# Patient Record
Sex: MALE | Race: WHITE | NOT HISPANIC OR LATINO | Employment: FULL TIME | URBAN - METROPOLITAN AREA
[De-identification: names, ages, dates, MRNs, and addresses within clinical notes are randomized per-mention and may not be internally consistent; named-entity substitution may affect disease eponyms.]

---

## 2022-01-07 ENCOUNTER — HOSPITAL ENCOUNTER (INPATIENT)
Facility: HOSPITAL | Age: 67
LOS: 1 days | Discharge: HOME | DRG: 177 | End: 2022-01-08
Attending: EMERGENCY MEDICINE | Admitting: STUDENT IN AN ORGANIZED HEALTH CARE EDUCATION/TRAINING PROGRAM
Payer: COMMERCIAL

## 2022-01-07 ENCOUNTER — APPOINTMENT (EMERGENCY)
Dept: RADIOLOGY | Facility: HOSPITAL | Age: 67
DRG: 177 | End: 2022-01-07
Payer: COMMERCIAL

## 2022-01-07 DIAGNOSIS — R06.6 SINGULTUS: ICD-10-CM

## 2022-01-07 DIAGNOSIS — U07.1 PNEUMONIA DUE TO COVID-19 VIRUS: ICD-10-CM

## 2022-01-07 DIAGNOSIS — U07.1 COVID-19: ICD-10-CM

## 2022-01-07 DIAGNOSIS — R09.02 HYPOXIA: ICD-10-CM

## 2022-01-07 DIAGNOSIS — E86.0 DEHYDRATION: ICD-10-CM

## 2022-01-07 DIAGNOSIS — A41.9 SEPSIS (HCC): Primary | ICD-10-CM

## 2022-01-07 DIAGNOSIS — E87.1 HYPONATREMIA: ICD-10-CM

## 2022-01-07 DIAGNOSIS — N17.9 AKI (ACUTE KIDNEY INJURY) (HCC): ICD-10-CM

## 2022-01-07 DIAGNOSIS — J12.82 PNEUMONIA DUE TO COVID-19 VIRUS: ICD-10-CM

## 2022-01-07 PROBLEM — R79.89 ELEVATED SERUM CREATININE: Status: ACTIVE | Noted: 2022-01-07

## 2022-01-07 PROBLEM — I10 HYPERTENSION: Status: ACTIVE | Noted: 2022-01-07

## 2022-01-07 PROBLEM — J96.01 ACUTE RESPIRATORY FAILURE WITH HYPOXIA (HCC): Status: ACTIVE | Noted: 2022-01-07

## 2022-01-07 LAB
2HR DELTA HS TROPONIN: 3 NG/L
4HR DELTA HS TROPONIN: -3 NG/L
ALBUMIN SERPL BCP-MCNC: 3 G/DL (ref 3.5–5)
ALP SERPL-CCNC: 132 U/L (ref 46–116)
ALT SERPL W P-5'-P-CCNC: 85 U/L (ref 12–78)
ANION GAP SERPL CALCULATED.3IONS-SCNC: 11 MMOL/L (ref 4–13)
APTT PPP: 32 SECONDS (ref 23–37)
AST SERPL W P-5'-P-CCNC: 102 U/L (ref 5–45)
BACTERIA UR QL AUTO: ABNORMAL /HPF
BASOPHILS # BLD AUTO: 0.01 THOUSANDS/ΜL (ref 0–0.1)
BASOPHILS NFR BLD AUTO: 0 % (ref 0–1)
BILIRUB SERPL-MCNC: 0.71 MG/DL (ref 0.2–1)
BILIRUB UR QL STRIP: NEGATIVE
BUN SERPL-MCNC: 31 MG/DL (ref 5–25)
CALCIUM ALBUM COR SERPL-MCNC: 9.1 MG/DL (ref 8.3–10.1)
CALCIUM SERPL-MCNC: 8.3 MG/DL (ref 8.3–10.1)
CARDIAC TROPONIN I PNL SERPL HS: 21 NG/L
CARDIAC TROPONIN I PNL SERPL HS: 24 NG/L
CARDIAC TROPONIN I PNL SERPL HS: 27 NG/L
CHLORIDE SERPL-SCNC: 93 MMOL/L (ref 100–108)
CLARITY UR: CLEAR
CO2 SERPL-SCNC: 26 MMOL/L (ref 21–32)
COLOR UR: YELLOW
CREAT SERPL-MCNC: 1.57 MG/DL (ref 0.6–1.3)
D DIMER PPP FEU-MCNC: 3.39 UG/ML FEU
EOSINOPHIL # BLD AUTO: 0 THOUSAND/ΜL (ref 0–0.61)
EOSINOPHIL NFR BLD AUTO: 0 % (ref 0–6)
ERYTHROCYTE [DISTWIDTH] IN BLOOD BY AUTOMATED COUNT: 14.3 % (ref 11.6–15.1)
FLUAV RNA RESP QL NAA+PROBE: NEGATIVE
FLUBV RNA RESP QL NAA+PROBE: NEGATIVE
GFR SERPL CREATININE-BSD FRML MDRD: 44 ML/MIN/1.73SQ M
GLUCOSE SERPL-MCNC: 108 MG/DL (ref 65–140)
GLUCOSE UR STRIP-MCNC: NEGATIVE MG/DL
HCT VFR BLD AUTO: 37.2 % (ref 36.5–49.3)
HGB BLD-MCNC: 12.3 G/DL (ref 12–17)
HGB UR QL STRIP.AUTO: ABNORMAL
HYALINE CASTS #/AREA URNS LPF: ABNORMAL /LPF
IMM GRANULOCYTES # BLD AUTO: 0.08 THOUSAND/UL (ref 0–0.2)
IMM GRANULOCYTES NFR BLD AUTO: 1 % (ref 0–2)
INR PPP: 0.93 (ref 0.84–1.19)
KETONES UR STRIP-MCNC: ABNORMAL MG/DL
LACTATE SERPL-SCNC: 1 MMOL/L (ref 0.5–2)
LEUKOCYTE ESTERASE UR QL STRIP: NEGATIVE
LYMPHOCYTES # BLD AUTO: 0.59 THOUSANDS/ΜL (ref 0.6–4.47)
LYMPHOCYTES NFR BLD AUTO: 8 % (ref 14–44)
MAGNESIUM SERPL-MCNC: 2.4 MG/DL (ref 1.6–2.6)
MCH RBC QN AUTO: 27.8 PG (ref 26.8–34.3)
MCHC RBC AUTO-ENTMCNC: 33.1 G/DL (ref 31.4–37.4)
MCV RBC AUTO: 84 FL (ref 82–98)
MONOCYTES # BLD AUTO: 0.79 THOUSAND/ΜL (ref 0.17–1.22)
MONOCYTES NFR BLD AUTO: 10 % (ref 4–12)
NEUTROPHILS # BLD AUTO: 6.09 THOUSANDS/ΜL (ref 1.85–7.62)
NEUTS SEG NFR BLD AUTO: 81 % (ref 43–75)
NITRITE UR QL STRIP: NEGATIVE
NON-SQ EPI CELLS URNS QL MICRO: ABNORMAL /HPF
NRBC BLD AUTO-RTO: 0 /100 WBCS
NT-PROBNP SERPL-MCNC: 115 PG/ML
PH UR STRIP.AUTO: 5.5 [PH]
PLATELET # BLD AUTO: 359 THOUSANDS/UL (ref 149–390)
PMV BLD AUTO: 9.4 FL (ref 8.9–12.7)
POTASSIUM SERPL-SCNC: 3.7 MMOL/L (ref 3.5–5.3)
PROCALCITONIN SERPL-MCNC: 0.39 NG/ML
PROT SERPL-MCNC: 7.2 G/DL (ref 6.4–8.2)
PROT UR STRIP-MCNC: ABNORMAL MG/DL
PROTHROMBIN TIME: 12.3 SECONDS (ref 11.6–14.5)
RBC # BLD AUTO: 4.43 MILLION/UL (ref 3.88–5.62)
RBC #/AREA URNS AUTO: ABNORMAL /HPF
RSV RNA RESP QL NAA+PROBE: NEGATIVE
SARS-COV-2 RNA RESP QL NAA+PROBE: POSITIVE
SODIUM SERPL-SCNC: 130 MMOL/L (ref 136–145)
SP GR UR STRIP.AUTO: 1.02 (ref 1–1.03)
UROBILINOGEN UR QL STRIP.AUTO: 0.2 E.U./DL
WBC # BLD AUTO: 7.56 THOUSAND/UL (ref 4.31–10.16)
WBC #/AREA URNS AUTO: ABNORMAL /HPF
WBC CASTS URNS QL MICRO: ABNORMAL /LPF

## 2022-01-07 PROCEDURE — 36415 COLL VENOUS BLD VENIPUNCTURE: CPT | Performed by: EMERGENCY MEDICINE

## 2022-01-07 PROCEDURE — 83880 ASSAY OF NATRIURETIC PEPTIDE: CPT | Performed by: EMERGENCY MEDICINE

## 2022-01-07 PROCEDURE — 85379 FIBRIN DEGRADATION QUANT: CPT | Performed by: STUDENT IN AN ORGANIZED HEALTH CARE EDUCATION/TRAINING PROGRAM

## 2022-01-07 PROCEDURE — 93005 ELECTROCARDIOGRAM TRACING: CPT

## 2022-01-07 PROCEDURE — 96374 THER/PROPH/DIAG INJ IV PUSH: CPT

## 2022-01-07 PROCEDURE — 0241U HB NFCT DS VIR RESP RNA 4 TRGT: CPT | Performed by: EMERGENCY MEDICINE

## 2022-01-07 PROCEDURE — 85610 PROTHROMBIN TIME: CPT | Performed by: EMERGENCY MEDICINE

## 2022-01-07 PROCEDURE — 83735 ASSAY OF MAGNESIUM: CPT | Performed by: EMERGENCY MEDICINE

## 2022-01-07 PROCEDURE — 85730 THROMBOPLASTIN TIME PARTIAL: CPT | Performed by: EMERGENCY MEDICINE

## 2022-01-07 PROCEDURE — XW033E5 INTRODUCTION OF REMDESIVIR ANTI-INFECTIVE INTO PERIPHERAL VEIN, PERCUTANEOUS APPROACH, NEW TECHNOLOGY GROUP 5: ICD-10-PCS | Performed by: STUDENT IN AN ORGANIZED HEALTH CARE EDUCATION/TRAINING PROGRAM

## 2022-01-07 PROCEDURE — 71045 X-RAY EXAM CHEST 1 VIEW: CPT

## 2022-01-07 PROCEDURE — 85025 COMPLETE CBC W/AUTO DIFF WBC: CPT | Performed by: EMERGENCY MEDICINE

## 2022-01-07 PROCEDURE — 96361 HYDRATE IV INFUSION ADD-ON: CPT

## 2022-01-07 PROCEDURE — 81001 URINALYSIS AUTO W/SCOPE: CPT | Performed by: EMERGENCY MEDICINE

## 2022-01-07 PROCEDURE — 83605 ASSAY OF LACTIC ACID: CPT | Performed by: EMERGENCY MEDICINE

## 2022-01-07 PROCEDURE — 99284 EMERGENCY DEPT VISIT MOD MDM: CPT

## 2022-01-07 PROCEDURE — 87040 BLOOD CULTURE FOR BACTERIA: CPT | Performed by: EMERGENCY MEDICINE

## 2022-01-07 PROCEDURE — 84484 ASSAY OF TROPONIN QUANT: CPT | Performed by: EMERGENCY MEDICINE

## 2022-01-07 PROCEDURE — 99291 CRITICAL CARE FIRST HOUR: CPT | Performed by: EMERGENCY MEDICINE

## 2022-01-07 PROCEDURE — 84145 PROCALCITONIN (PCT): CPT | Performed by: EMERGENCY MEDICINE

## 2022-01-07 PROCEDURE — 80053 COMPREHEN METABOLIC PANEL: CPT | Performed by: EMERGENCY MEDICINE

## 2022-01-07 RX ORDER — ALBUTEROL SULFATE 90 UG/1
2 AEROSOL, METERED RESPIRATORY (INHALATION) EVERY 6 HOURS PRN
COMMUNITY
End: 2022-02-14

## 2022-01-07 RX ORDER — ALBUTEROL SULFATE 90 UG/1
2 AEROSOL, METERED RESPIRATORY (INHALATION) EVERY 4 HOURS PRN
Status: DISCONTINUED | OUTPATIENT
Start: 2022-01-07 | End: 2022-01-08 | Stop reason: HOSPADM

## 2022-01-07 RX ORDER — DEXAMETHASONE SODIUM PHOSPHATE 4 MG/ML
6 INJECTION, SOLUTION INTRA-ARTICULAR; INTRALESIONAL; INTRAMUSCULAR; INTRAVENOUS; SOFT TISSUE EVERY 24 HOURS
Status: DISCONTINUED | OUTPATIENT
Start: 2022-01-08 | End: 2022-01-08 | Stop reason: HOSPADM

## 2022-01-07 RX ORDER — SODIUM CHLORIDE 9 MG/ML
75 INJECTION, SOLUTION INTRAVENOUS CONTINUOUS
Status: DISCONTINUED | OUTPATIENT
Start: 2022-01-07 | End: 2022-01-08 | Stop reason: HOSPADM

## 2022-01-07 RX ORDER — ACETAMINOPHEN 325 MG/1
650 TABLET ORAL ONCE
Status: COMPLETED | OUTPATIENT
Start: 2022-01-07 | End: 2022-01-07

## 2022-01-07 RX ORDER — ACETAMINOPHEN 325 MG/1
650 TABLET ORAL EVERY 6 HOURS PRN
Status: DISCONTINUED | OUTPATIENT
Start: 2022-01-07 | End: 2022-01-08 | Stop reason: HOSPADM

## 2022-01-07 RX ORDER — AZITHROMYCIN 250 MG/1
500 TABLET, FILM COATED ORAL EVERY 24 HOURS
COMMUNITY
End: 2022-01-08 | Stop reason: HOSPADM

## 2022-01-07 RX ORDER — LISINOPRIL AND HYDROCHLOROTHIAZIDE 12.5; 1 MG/1; MG/1
1 TABLET ORAL DAILY
COMMUNITY
End: 2022-01-18 | Stop reason: HOSPADM

## 2022-01-07 RX ORDER — DEXAMETHASONE SODIUM PHOSPHATE 4 MG/ML
10 INJECTION, SOLUTION INTRA-ARTICULAR; INTRALESIONAL; INTRAMUSCULAR; INTRAVENOUS; SOFT TISSUE ONCE
Status: COMPLETED | OUTPATIENT
Start: 2022-01-07 | End: 2022-01-07

## 2022-01-07 RX ORDER — GUAIFENESIN AND CODEINE PHOSPHATE 100; 10 MG/5ML; MG/5ML
5 SOLUTION ORAL 3 TIMES DAILY PRN
COMMUNITY
End: 2022-02-14

## 2022-01-07 RX ORDER — ONDANSETRON 2 MG/ML
4 INJECTION INTRAMUSCULAR; INTRAVENOUS ONCE
Status: DISCONTINUED | OUTPATIENT
Start: 2022-01-07 | End: 2022-01-07

## 2022-01-07 RX ORDER — METOCLOPRAMIDE HYDROCHLORIDE 5 MG/ML
10 INJECTION INTRAMUSCULAR; INTRAVENOUS ONCE
Status: COMPLETED | OUTPATIENT
Start: 2022-01-07 | End: 2022-01-07

## 2022-01-07 RX ADMIN — IPRATROPIUM BROMIDE 0.5 MG: 0.5 SOLUTION RESPIRATORY (INHALATION) at 15:42

## 2022-01-07 RX ADMIN — METOCLOPRAMIDE 10 MG: 5 INJECTION, SOLUTION INTRAMUSCULAR; INTRAVENOUS at 13:35

## 2022-01-07 RX ADMIN — REMDESIVIR 200 MG: 100 INJECTION, POWDER, LYOPHILIZED, FOR SOLUTION INTRAVENOUS at 17:14

## 2022-01-07 RX ADMIN — SODIUM CHLORIDE 75 ML/HR: 0.9 INJECTION, SOLUTION INTRAVENOUS at 16:14

## 2022-01-07 RX ADMIN — SODIUM CHLORIDE 500 ML: 0.9 INJECTION, SOLUTION INTRAVENOUS at 13:33

## 2022-01-07 RX ADMIN — ALBUTEROL SULFATE 5 MG: 2.5 SOLUTION RESPIRATORY (INHALATION) at 15:42

## 2022-01-07 RX ADMIN — ENOXAPARIN SODIUM 90 MG: 100 INJECTION SUBCUTANEOUS at 17:14

## 2022-01-07 RX ADMIN — SODIUM CHLORIDE 75 ML/HR: 0.9 INJECTION, SOLUTION INTRAVENOUS at 21:06

## 2022-01-07 RX ADMIN — ACETAMINOPHEN 650 MG: 325 TABLET, FILM COATED ORAL at 13:42

## 2022-01-07 RX ADMIN — DEXAMETHASONE SODIUM PHOSPHATE 10 MG: 4 INJECTION INTRA-ARTICULAR; INTRALESIONAL; INTRAMUSCULAR; INTRAVENOUS; SOFT TISSUE at 13:42

## 2022-01-07 NOTE — ASSESSMENT & PLAN NOTE
Chest x-ray with bibasilar opacities  Initiate COVID protocol with IV steroids and remdesivir  Anticoagulation  Supplemental oxygen  Incentive spirometer and encourage proning  Trend inflammatory markers

## 2022-01-07 NOTE — H&P
Yomaira U  64   H&P- Charisma Landrum 1955, 74 y o  male MRN: [id number]  Unit/Bed#: ED 05 Encounter: [id number]  Primary Care Provider: No primary care provider on file  Date and time admitted to hospital: 1/7/2022 12:03 PM    * Acute respiratory failure with hypoxia (HCC)  Assessment & Plan  Likely due to COVID-19  Continue supplemental oxygen    COVID-19  Assessment & Plan  Chest x-ray with bibasilar opacities  Initiate COVID protocol with IV steroids and remdesivir  Supplemental oxygen  Incentive spirometer and encourage proning  Trend inflammatory markers    Elevated serum creatinine  Assessment & Plan  Unknown baseline  Likely due to dehydration  Continue IV fluids    Hyponatremia  Assessment & Plan  Likely due to volume depletion  Continue IV fluids      Hypertension  Assessment & Plan  Hold home diuretics/ARB in setting of elevated creatinine      VTE Pharmacologic Prophylaxis:   Moderate Risk (Score 3-4) - Pharmacological DVT Prophylaxis Ordered: enoxaparin (Lovenox)  Code Status: No Order     Anticipated Length of Stay: Patient will be admitted on an inpatient basis with an anticipated length of stay of greater than 2 midnights secondary to Respiratory failure  Total Time for Visit, including Counseling / Coordination of Care: 45 minutes Greater than 50% of this total time spent on direct patient counseling and coordination of care  Chief Complaint: Shortness of breath     History of Present Illness:  Charisma Landrum is a 74 y o  male with a PMH of hypertension presenting with shortness of breath associated with cough and generalized fatigue which started about 2 weeks prior  Patient reports his daughter as a sick contact who tested positive for COVID  Patient is not vaccinated  Denies any recent travel  Patient was taking azithromycin and albuterol inhaler prescribed outpatient however his symptoms did not improve which prompted his arrival in the ED today  Review of Systems:  Review of Systems   Constitutional: Positive for fatigue  Negative for chills and fever  HENT: Negative for rhinorrhea and sore throat  Respiratory: Positive for cough and shortness of breath  Cardiovascular: Negative for chest pain and leg swelling  Gastrointestinal: Negative for abdominal pain, constipation, diarrhea, nausea and vomiting  Genitourinary: Negative for dysuria and hematuria  Neurological: Negative for dizziness, syncope and headaches  Psychiatric/Behavioral: Negative for agitation  The patient is not nervous/anxious  Past Medical and Surgical History:   Past Medical History:   Diagnosis Date    Hypertension        History reviewed  No pertinent surgical history  Meds/Allergies:  Prior to Admission medications    Medication Sig Start Date End Date Taking? Authorizing Provider   albuterol (PROVENTIL HFA,VENTOLIN HFA) 90 mcg/act inhaler Inhale 2 puffs every 6 (six) hours as needed for wheezing   Yes Historical Provider, MD   azithromycin (ZITHROMAX) 250 mg tablet Take 500 mg by mouth every 24 hours   Yes Historical Provider, MD   guaifenesin-codeine (GUAIFENESIN AC) 100-10 MG/5ML liquid Take 5 mL by mouth 3 (three) times a day as needed for cough   Yes Historical Provider, MD   lisinopril-hydrochlorothiazide (PRINZIDE,ZESTORETIC) 10-12 5 MG per tablet Take 1 tablet by mouth daily   Yes Historical Provider, MD     I have reviewed home medications with patient personally  Allergies:    Allergies   Allergen Reactions    Sulfa Antibiotics Other (See Comments)     unknown       Social History:  Marital Status: /Civil Union   Occupation:  [employer]  Patient Pre-hospital Living Situation: Home  Patient Pre-hospital Level of Mobility: walks  Patient Pre-hospital Diet Restrictions: None  Substance Use History:   Social History     Substance and Sexual Activity   Alcohol Use None     Social History     Tobacco Use   Smoking Status Never Smoker Smokeless Tobacco Never Used     Social History     Substance and Sexual Activity   Drug Use Not on file       Family History:  History reviewed  No pertinent family history  Physical Exam:     Vitals:   Blood Pressure: 98/57 (01/07/22 1533)  Pulse: 90 (01/07/22 1533)  Temperature: 98 9 °F (37 2 °C) (01/07/22 1533)  Temp Source: Oral (01/07/22 1533)  Respirations: (!) 26 (01/07/22 1533)  Height: 6' 3" (190 5 cm) (01/07/22 1136)  Weight - Scale: 90 7 kg (200 lb) (01/07/22 1136)  SpO2: 93 % (01/07/22 1533)    Physical Exam  HENT:      Head: Normocephalic and atraumatic  Mouth/Throat:      Mouth: Mucous membranes are moist    Eyes:      Extraocular Movements: Extraocular movements intact  Cardiovascular:      Rate and Rhythm: Normal rate and regular rhythm  Pulmonary:      Effort: Pulmonary effort is normal       Comments: Decreased breath sounds  Abdominal:      General: Abdomen is flat  Bowel sounds are normal       Palpations: Abdomen is soft  Tenderness: There is no abdominal tenderness  Musculoskeletal:      Right lower leg: No edema  Left lower leg: No edema  Skin:     General: Skin is warm and dry  Neurological:      Mental Status: He is alert and oriented to person, place, and time            Additional Data:     Lab Results:  Results from last 7 days   Lab Units 01/07/22  1309   WBC Thousand/uL 7 56   HEMOGLOBIN g/dL 12 3   HEMATOCRIT % 37 2   PLATELETS Thousands/uL 359   NEUTROS PCT % 81*   LYMPHS PCT % 8*   MONOS PCT % 10   EOS PCT % 0     Results from last 7 days   Lab Units 01/07/22  1309   SODIUM mmol/L 130*   POTASSIUM mmol/L 3 7   CHLORIDE mmol/L 93*   CO2 mmol/L 26   BUN mg/dL 31*   CREATININE mg/dL 1 57*   ANION GAP mmol/L 11   CALCIUM mg/dL 8 3   ALBUMIN g/dL 3 0*   TOTAL BILIRUBIN mg/dL 0 71   ALK PHOS U/L 132*   ALT U/L 85*   AST U/L 102*   GLUCOSE RANDOM mg/dL 108     Results from last 7 days   Lab Units 01/07/22  1309   INR  0 93             Results from last 7 days Lab Units 01/07/22  1309   LACTIC ACID mmol/L 1 0       Imaging: Reviewed radiology reports from this admission including: chest xray  XR chest 1 view portable   Final Result by Víctor Singh MD (01/07 1305)      Bibasilar opacities which can represent pneumonia, including COVID 19, in the appropriate clinical setting  Workstation performed: [id number]                 ** Please Note: This note has been constructed using a voice recognition system   **

## 2022-01-07 NOTE — ED PROVIDER NOTES
History  Chief Complaint   Patient presents with    Flu Symptoms     started the 30th with symptoms and now having muscle spasms and hiccups with sob     49-year-old male with past history of hypertension, presents to the ED for evaluation generalized body aches, fevers, chills, shortness of breath, nausea over the past week  Symptoms started after daughter was sick and diagnosed with COVID-19 infection  Patient is not vaccinated against COVID-19 infection  Patient was taking ivermectin by PCP for COVID-19 prevention  Patient was given azithromycin and cough medicine by PCP for his symptoms which is not helping  Patient continues to have high fever  Patient is febrile and tachycardic in the emergency department regarding source  Patient also knows that he is having uncontrollable hiccups over the past 4 days  History provided by:  Patient  Flu Symptoms  Presenting symptoms: fatigue, fever, nausea and shortness of breath    Presenting symptoms: no cough, no diarrhea, no headaches, no sore throat and no vomiting    Associated symptoms: chills    Associated symptoms: no congestion        None       Past Medical History:   Diagnosis Date    Hypertension        History reviewed  No pertinent surgical history  History reviewed  No pertinent family history  I have reviewed and agree with the history as documented  E-Cigarette/Vaping     E-Cigarette/Vaping Substances     Social History     Tobacco Use    Smoking status: Never Smoker    Smokeless tobacco: Never Used   Substance Use Topics    Alcohol use: Not on file    Drug use: Not on file       Review of Systems   Constitutional: Positive for chills, fatigue and fever  Negative for activity change  HENT: Negative for congestion, ear discharge and sore throat  Eyes: Negative for pain and redness  Respiratory: Positive for shortness of breath  Negative for cough, chest tightness and wheezing  Cardiovascular: Negative for chest pain  Gastrointestinal: Positive for nausea  Negative for abdominal pain, diarrhea and vomiting  Endocrine: Negative for cold intolerance  Genitourinary: Negative for dysuria and urgency  Musculoskeletal: Negative for arthralgias and back pain  Neurological: Negative for dizziness, weakness and headaches  Psychiatric/Behavioral: Negative for agitation and behavioral problems  Physical Exam  Physical Exam  Vitals and nursing note reviewed  Constitutional:       Appearance: He is well-developed  HENT:      Head: Normocephalic and atraumatic  Nose: Nose normal    Eyes:      Conjunctiva/sclera: Conjunctivae normal    Cardiovascular:      Rate and Rhythm: Normal rate and regular rhythm  Heart sounds: Normal heart sounds  Pulmonary:      Effort: Pulmonary effort is normal       Breath sounds: Normal breath sounds  Comments: Crackles noted to bibasilar lungs  Patient is satting 91% on room air  Abdominal:      General: Bowel sounds are normal  There is no distension  Palpations: Abdomen is soft  Tenderness: There is no abdominal tenderness  Musculoskeletal:         General: Normal range of motion  Cervical back: Normal range of motion and neck supple  Skin:     General: Skin is warm  Neurological:      General: No focal deficit present  Mental Status: He is alert and oriented to person, place, and time  Psychiatric:         Mood and Affect: Mood normal          Behavior: Behavior normal          Thought Content:  Thought content normal          Judgment: Judgment normal          Vital Signs  ED Triage Vitals   Temperature Pulse Respirations Blood Pressure SpO2   01/07/22 1136 01/07/22 1136 01/07/22 1136 01/07/22 1136 01/07/22 1136   (!) 102 2 °F (39 °C) 105 20 148/79 91 %      Temp Source Heart Rate Source Patient Position - Orthostatic VS BP Location FiO2 (%)   01/07/22 1533 01/07/22 1316 01/07/22 1316 01/07/22 1316 --   Oral Monitor Lying Right arm       Pain Score       01/07/22 1316       No Pain           Vitals:    01/07/22 1136 01/07/22 1316 01/07/22 1515 01/07/22 1533   BP: 148/79 123/57  98/57   Pulse: 105 102 87 90   Patient Position - Orthostatic VS:  Lying Lying Sitting         Visual Acuity      ED Medications  Medications   dexamethasone (DECADRON) injection 6 mg (has no administration in time range)   remdesivir (Veklury) 200 mg in sodium chloride 0 9 % 290 mL IVPB (has no administration in time range)     Followed by   remdesivir Rubio Smoker) 100 mg in sodium chloride 0 9 % 270 mL IVPB (has no administration in time range)   albuterol (PROVENTIL HFA,VENTOLIN HFA) inhaler 2 puff (has no administration in time range)   sodium chloride 0 9 % bolus 500 mL (0 mL Intravenous Stopped 1/7/22 1511)   metoclopramide (REGLAN) injection 10 mg (10 mg Intravenous Given 1/7/22 1335)   dexamethasone (DECADRON) injection 10 mg (10 mg Intravenous Given 1/7/22 1342)   ipratropium (ATROVENT) 0 02 % inhalation solution 0 5 mg (0 5 mg Nebulization Given 1/7/22 1542)   albuterol inhalation solution 5 mg (5 mg Nebulization Given 1/7/22 1542)   acetaminophen (TYLENOL) tablet 650 mg (650 mg Oral Given 1/7/22 1342)       Diagnostic Studies  Results Reviewed     Procedure Component Value Units Date/Time    HS Troponin I 2hr [[id number]] Collected: 01/07/22 1541    Lab Status: No result Specimen: Blood from Arm, Right     D-dimer, quantitative [[id number]]     Lab Status: No result Specimen: Blood     HS Troponin I 4hr [[id number]]     Lab Status: No result Specimen: Blood     COVID/FLU/RSV - 2 hour TAT [[id number]]  (Abnormal) Collected: 01/07/22 1309    Lab Status: Final result Specimen: Nares from Nose Updated: 01/07/22 1409     SARS-CoV-2 Positive     INFLUENZA A PCR Negative     INFLUENZA B PCR Negative     RSV PCR Negative    Narrative:      FOR PEDIATRIC PATIENTS - copy/paste COVID Guidelines URL to browser: [email or web address]    SARS-CoV-2 assay is a Nucleic Acid Amplification assay intended for the  qualitative detection of nucleic acid from SARS-CoV-2 in nasopharyngeal  swabs  Results are for the presumptive identification of SARS-CoV-2 RNA  Positive results are indicative of infection with SARS-CoV-2, the virus  causing COVID-19, but do not rule out bacterial infection or co-infection  with other viruses  Laboratories within the [place] and its  territories are required to report all positive results to the appropriate  public health authorities  Negative results do not preclude SARS-CoV-2  infection and should not be used as the sole basis for treatment or other  patient management decisions  Negative results must be combined with  clinical observations, patient history, and epidemiological information  This test has not been FDA cleared or approved  This test has been authorized by FDA under an Emergency Use Authorization  (EUA)  This test is only authorized for the duration of time the  declaration that circumstances exist justifying the authorization of the  emergency use of an in vitro diagnostic tests for detection of SARS-CoV-2  virus and/or diagnosis of COVID-19 infection under section 564(b)(1) of  the Act, 21 U  S C  176TGL-4(C)(6), unless the authorization is terminated  or revoked sooner  The test has been validated but independent review by FDA  and CLIA is pending  Test performed using [employer] GeneXpert: This RT-PCR assay targets N2,  a region unique to SARS-CoV-2  A conserved region in the E-gene was chosen  for pan-Sarbecovirus detection which includes SARS-CoV-2      Comprehensive metabolic panel [[id number]]  (Abnormal) Collected: 01/07/22 1309    Lab Status: Final result Specimen: Blood from Arm, Left Updated: 01/07/22 1359     Sodium 130 mmol/L      Potassium 3 7 mmol/L      Chloride 93 mmol/L      CO2 26 mmol/L      ANION GAP 11 mmol/L      BUN 31 mg/dL      Creatinine 1 57 mg/dL      Glucose 108 mg/dL      Calcium 8 3 mg/dL      Corrected Calcium 9 1 mg/dL       U/L      ALT 85 U/L      Alkaline Phosphatase 132 U/L      Total Protein 7 2 g/dL      Albumin 3 0 g/dL      Total Bilirubin 0 71 mg/dL      eGFR 44 ml/min/1 73sq m     Narrative:      [place] guidelines for Chronic Kidney Disease (CKD):     Stage 1 with normal or high GFR (GFR > 90 mL/min/1 73 square meters)    Stage 2 Mild CKD (GFR = 60-89 mL/min/1 73 square meters)    Stage 3A Moderate CKD (GFR = 45-59 mL/min/1 73 square meters)    Stage 3B Moderate CKD (GFR = 30-44 mL/min/1 73 square meters)    Stage 4 Severe CKD (GFR = 15-29 mL/min/1 73 square meters)    Stage 5 End Stage CKD (GFR <15 mL/min/1 73 square meters)  Note: GFR calculation is accurate only with a steady state creatinine    Magnesium [[id number]]  (Normal) Collected: 01/07/22 1309    Lab Status: Final result Specimen: Blood from Arm, Left Updated: 01/07/22 1359     Magnesium 2 4 mg/dL     NT-BNP PRO [[id number]]  (Normal) Collected: 01/07/22 1309    Lab Status: Final result Specimen: Blood from Arm, Left Updated: 01/07/22 1359     NT-proBNP 115 pg/mL     HS Troponin 0hr (reflex protocol) [[id number]]  (Normal) Collected: 01/07/22 1309    Lab Status: Final result Specimen: Blood from Arm, Left Updated: 01/07/22 1352     hs TnI 0hr 24 ng/L     Lactic acid [[id number]]  (Normal) Collected: 01/07/22 1309    Lab Status: Final result Specimen: Blood from Arm, Left Updated: 01/07/22 1348     LACTIC ACID 1 0 mmol/L     Narrative:      Result may be elevated if tourniquet was used during collection      Efrain Malone [[id number]]  (Normal) Collected: 01/07/22 1309    Lab Status: Final result Specimen: Blood from Arm, Left Updated: 01/07/22 1340     Protime 12 3 seconds      INR 0 93    APTT [[id number]]  (Normal) Collected: 01/07/22 1309    Lab Status: Final result Specimen: Blood from Arm, Left Updated: 01/07/22 1340     PTT 32 seconds     Blood culture #1 [[id number]] Collected: 01/07/22 1300    Lab Status: In process Specimen: Blood from Arm, Left Updated: 01/07/22 1326    CBC and differential [[id number]]  (Abnormal) Collected: 01/07/22 1309    Lab Status: Final result Specimen: Blood from Arm, Left Updated: 01/07/22 1325     WBC 7 56 Thousand/uL      RBC 4 43 Million/uL      Hemoglobin 12 3 g/dL      Hematocrit 37 2 %      MCV 84 fL      MCH 27 8 pg      MCHC 33 1 g/dL      RDW 14 3 %      MPV 9 4 fL      Platelets 305 Thousands/uL      nRBC 0 /100 WBCs      Neutrophils Relative 81 %      Immat GRANS % 1 %      Lymphocytes Relative 8 %      Monocytes Relative 10 %      Eosinophils Relative 0 %      Basophils Relative 0 %      Neutrophils Absolute 6 09 Thousands/µL      Immature Grans Absolute 0 08 Thousand/uL      Lymphocytes Absolute 0 59 Thousands/µL      Monocytes Absolute 0 79 Thousand/µL      Eosinophils Absolute 0 00 Thousand/µL      Basophils Absolute 0 01 Thousands/µL     Blood culture #2 [[id number]] Collected: 01/07/22 1309    Lab Status: In process Specimen: Blood from Arm, Right Updated: 01/07/22 1325    Procalcitonin [[id number]] Collected: 01/07/22 1309    Lab Status: In process Specimen: Blood from Arm, Left Updated: 01/07/22 1323    UA w Reflex to Microscopic w Reflex to Culture [[id number]]     Lab Status: No result Specimen: Urine                  XR chest 1 view portable   Final Result by Maricela Harada, MD (01/07 1305)      Bibasilar opacities which can represent pneumonia, including COVID 19, in the appropriate clinical setting                    Workstation performed: [id number]                    Procedures  ECG 12 Lead Documentation Only    Date/Time: 1/7/2022 2:04 PM  Performed by: Nathalie Romero DO  Authorized by: Nathalie Romero DO     Indications / Diagnosis:  Shortness of breath  ECG reviewed by me, the ED Provider: yes    Patient location:  ED  Previous ECG:     Previous ECG:  Unavailable    Comparison to cardiac monitor: Yes    Interpretation:     Interpretation: normal    Comments:      Sinus rhythm, rate 95, normal axis and normal intervals, no acute ST/T-wave abnormalities noted, otherwise unremarkable EKG, no previous EKG available for comparison    CriticalCare Time  Performed by: Nathalie Romero DO  Authorized by: Nathalie Romero DO     Critical care provider statement:     Critical care time (minutes):  60    Critical care time was exclusive of:  Separately billable procedures and treating other patients    Critical care was necessary to treat or prevent imminent or life-threatening deterioration of the following conditions:  Respiratory failure and sepsis    Critical care was time spent personally by me on the following activities:  Blood draw for specimens, obtaining history from patient or surrogate, development of treatment plan with patient or surrogate, discussions with consultants, evaluation of patient's response to treatment, examination of patient, review of old charts, re-evaluation of patient's condition, ordering and review of laboratory studies, ordering and review of radiographic studies, interpretation of cardiac output measurements and ordering and performing treatments and interventions             ED Course                                             MDM  Number of Diagnoses or Management Options  ARIAS (acute kidney injury) [facility]): new and requires workup  Dehydration: new and requires workup  Hyponatremia: new and requires workup  Hypoxia: new and requires workup  Pneumonia due to COVID-19 virus: new and requires workup  Sepsis [facility]): new and requires workup  Singultus: new and requires workup  Diagnosis management comments: Obtain septic workup, viral swab, EKG, chest x-ray  Give steroids, anti emetics, antipyretics, neb treatment and reassess       Amount and/or Complexity of Data Reviewed  Clinical lab tests: ordered and reviewed  Tests in the radiology section of CPT®: ordered and reviewed  Tests in the medicine section of CPT®: ordered and reviewed  Review and summarize past medical records: yes  Independent visualization of images, tracings, or specimens: yes    Risk of Complications, Morbidity, and/or Mortality  General comments: Patient initially met SIRS criteria  Sources COVID-19 pneumonia  Patient's CMP showed concern for some hyponatremia as well as AKA  IV fluid hydration started in the ED  Oxygen saturation improved with supplemental nasal cannula oxygen  At this time patient is admitted for further management  Patient and family agrees with admission plans  Patient Progress  Patient progress: stable      Disposition  Final diagnoses:   Sepsis ([facility] )   Pneumonia due to COVID-19 virus   Hyponatremia   Singultus   Hypoxia   ARIAS (acute kidney injury) ([facility] )   Dehydration     Time reflects when diagnosis was documented in both MDM as applicable and the Disposition within this note     Time User Action Codes Description Comment    1/7/2022  3:36 PM Konstantin Royal Add [A41 9] Sepsis ([facility] )     1/7/2022  3:36 PM Konstantin Ingrid Add [U07 1,  J12 82] Pneumonia due to COVID-19 virus     1/7/2022  3:37 PM Ferdous, Hermann Harsh Add [E87 1] Hyponatremia     1/7/2022  3:37 PM Ferdous, Komaira Add [R06 6] Singultus     1/7/2022  3:38 PM Ferdous, Hermann Harsh Add [R09 02] Hypoxia     1/7/2022  3:42 PM Konstantin Royal Add [N17 9] ARIAS (acute kidney injury) ([facility] )     1/7/2022  3:42 PM Konstantin Ingrid Add [E86 0] Dehydration       ED Disposition     ED Disposition Condition Date/Time Comment    Admit Stable Fri Jan 7, 2022  3:36 PM Case was discussed with Dr Ana Guerra and the patient's admission status was agreed to be Admission Status: inpatient status to the service of Dr Ana Guerra  Follow-up Information    None         Patient's Medications    No medications on file       No discharge procedures on file      PDMP Review     None          ED Provider  Electronically Signed by           Red Sigala,   01/07/22 1543

## 2022-01-08 VITALS
TEMPERATURE: 97.7 F | WEIGHT: 200 LBS | SYSTOLIC BLOOD PRESSURE: 128 MMHG | RESPIRATION RATE: 18 BRPM | OXYGEN SATURATION: 93 % | DIASTOLIC BLOOD PRESSURE: 69 MMHG | BODY MASS INDEX: 24.87 KG/M2 | HEART RATE: 92 BPM | HEIGHT: 75 IN

## 2022-01-08 PROBLEM — J96.01 ACUTE RESPIRATORY FAILURE WITH HYPOXIA (HCC): Status: RESOLVED | Noted: 2022-01-07 | Resolved: 2022-01-08

## 2022-01-08 PROBLEM — R79.89 ELEVATED SERUM CREATININE: Status: RESOLVED | Noted: 2022-01-07 | Resolved: 2022-01-08

## 2022-01-08 LAB
ALBUMIN SERPL BCP-MCNC: 2.4 G/DL (ref 3.5–5)
ALP SERPL-CCNC: 110 U/L (ref 46–116)
ALT SERPL W P-5'-P-CCNC: 69 U/L (ref 12–78)
ANION GAP SERPL CALCULATED.3IONS-SCNC: 11 MMOL/L (ref 4–13)
AST SERPL W P-5'-P-CCNC: 67 U/L (ref 5–45)
BASOPHILS # BLD AUTO: 0.01 THOUSANDS/ΜL (ref 0–0.1)
BASOPHILS NFR BLD AUTO: 0 % (ref 0–1)
BILIRUB SERPL-MCNC: 0.46 MG/DL (ref 0.2–1)
BUN SERPL-MCNC: 27 MG/DL (ref 5–25)
CALCIUM ALBUM COR SERPL-MCNC: 9.6 MG/DL (ref 8.3–10.1)
CALCIUM SERPL-MCNC: 8.3 MG/DL (ref 8.3–10.1)
CHLORIDE SERPL-SCNC: 98 MMOL/L (ref 100–108)
CO2 SERPL-SCNC: 25 MMOL/L (ref 21–32)
CREAT SERPL-MCNC: 1.1 MG/DL (ref 0.6–1.3)
CRP SERPL QL: 91.1 MG/L
EOSINOPHIL # BLD AUTO: 0 THOUSAND/ΜL (ref 0–0.61)
EOSINOPHIL NFR BLD AUTO: 0 % (ref 0–6)
ERYTHROCYTE [DISTWIDTH] IN BLOOD BY AUTOMATED COUNT: 14.3 % (ref 11.6–15.1)
GFR SERPL CREATININE-BSD FRML MDRD: 68 ML/MIN/1.73SQ M
GLUCOSE SERPL-MCNC: 131 MG/DL (ref 65–140)
HCT VFR BLD AUTO: 36.6 % (ref 36.5–49.3)
HCV AB SER QL: NORMAL
HGB BLD-MCNC: 12 G/DL (ref 12–17)
IMM GRANULOCYTES # BLD AUTO: 0.08 THOUSAND/UL (ref 0–0.2)
IMM GRANULOCYTES NFR BLD AUTO: 2 % (ref 0–2)
LYMPHOCYTES # BLD AUTO: 0.47 THOUSANDS/ΜL (ref 0.6–4.47)
LYMPHOCYTES NFR BLD AUTO: 9 % (ref 14–44)
MCH RBC QN AUTO: 27.9 PG (ref 26.8–34.3)
MCHC RBC AUTO-ENTMCNC: 32.8 G/DL (ref 31.4–37.4)
MCV RBC AUTO: 85 FL (ref 82–98)
MONOCYTES # BLD AUTO: 0.72 THOUSAND/ΜL (ref 0.17–1.22)
MONOCYTES NFR BLD AUTO: 13 % (ref 4–12)
NEUTROPHILS # BLD AUTO: 4.18 THOUSANDS/ΜL (ref 1.85–7.62)
NEUTS SEG NFR BLD AUTO: 76 % (ref 43–75)
NRBC BLD AUTO-RTO: 0 /100 WBCS
PLATELET # BLD AUTO: 381 THOUSANDS/UL (ref 149–390)
PMV BLD AUTO: 9.5 FL (ref 8.9–12.7)
POTASSIUM SERPL-SCNC: 3.8 MMOL/L (ref 3.5–5.3)
PROCALCITONIN SERPL-MCNC: 0.3 NG/ML
PROT SERPL-MCNC: 6.4 G/DL (ref 6.4–8.2)
RBC # BLD AUTO: 4.3 MILLION/UL (ref 3.88–5.62)
SODIUM SERPL-SCNC: 134 MMOL/L (ref 136–145)
WBC # BLD AUTO: 5.46 THOUSAND/UL (ref 4.31–10.16)

## 2022-01-08 PROCEDURE — 86803 HEPATITIS C AB TEST: CPT | Performed by: INTERNAL MEDICINE

## 2022-01-08 PROCEDURE — 94761 N-INVAS EAR/PLS OXIMETRY MLT: CPT

## 2022-01-08 PROCEDURE — 85025 COMPLETE CBC W/AUTO DIFF WBC: CPT | Performed by: STUDENT IN AN ORGANIZED HEALTH CARE EDUCATION/TRAINING PROGRAM

## 2022-01-08 PROCEDURE — 99239 HOSP IP/OBS DSCHRG MGMT >30: CPT | Performed by: STUDENT IN AN ORGANIZED HEALTH CARE EDUCATION/TRAINING PROGRAM

## 2022-01-08 PROCEDURE — 84145 PROCALCITONIN (PCT): CPT | Performed by: EMERGENCY MEDICINE

## 2022-01-08 PROCEDURE — 86140 C-REACTIVE PROTEIN: CPT | Performed by: STUDENT IN AN ORGANIZED HEALTH CARE EDUCATION/TRAINING PROGRAM

## 2022-01-08 PROCEDURE — 80053 COMPREHEN METABOLIC PANEL: CPT | Performed by: STUDENT IN AN ORGANIZED HEALTH CARE EDUCATION/TRAINING PROGRAM

## 2022-01-08 RX ORDER — DEXAMETHASONE 6 MG/1
6 TABLET ORAL DAILY
Qty: 9 TABLET | Refills: 0 | Status: SHIPPED | OUTPATIENT
Start: 2022-01-08 | End: 2022-01-18 | Stop reason: HOSPADM

## 2022-01-08 RX ADMIN — DEXAMETHASONE SODIUM PHOSPHATE 6 MG: 4 INJECTION INTRA-ARTICULAR; INTRALESIONAL; INTRAMUSCULAR; INTRAVENOUS; SOFT TISSUE at 09:01

## 2022-01-08 RX ADMIN — ENOXAPARIN SODIUM 90 MG: 100 INJECTION SUBCUTANEOUS at 08:55

## 2022-01-08 NOTE — DISCHARGE INSTRUCTIONS
Please follow-up with your PMD within 1 week    [address]    Your healthcare provider and/or public health staff have evaluated you and have determined that you do not need to remain in the hospital at this time  At this time you can be isolated at home where you will be monitored by staff from your local or state health department  You should carefully follow the prevention and isolation steps below until a healthcare provider or local or state health department says that you can return to your normal activities  Stay home except to get medical care    People who are mildly ill with COVID-19 are able to isolate at home during their illness  You should restrict activities outside your home, except for getting medical care  Do not go to work, school, or public areas  Avoid using public transportation, ride-sharing, or taxis  Separate yourself from other people and animals in your home    People: As much as possible, you should stay in a specific room and away from other people in your home  Also, you should use a separate bathroom, if available  Animals: You should restrict contact with pets and other animals while you are sick with COVID-19, just like you would around other people  Although there have not been reports of pets or other animals becoming sick with COVID-19, it is still recommended that people sick with COVID-19 limit contact with animals until more information is known about the virus  When possible, have another member of your household care for your animals while you are sick  If you are sick with COVID-19, avoid contact with your pet, including petting, snuggling, being kissed or licked, and sharing food  If you must care for your pet or be around animals while you are sick, wash your hands before and after you interact with pets and wear a facemask  See COVID-19 and Animals for more information      Call ahead before visiting your doctor    If you have a medical appointment, call the healthcare provider and tell them that you have or may have COVID-19  This will help the healthcare providers office take steps to keep other people from getting infected or exposed  Wear a facemask    You should wear a facemask when you are around other people (e g , sharing a room or vehicle) or pets and before you enter a healthcare providers office  If you are not able to wear a facemask (for example, because it causes trouble breathing), then people who live with you should not stay in the same room with you, or they should wear a facemask if they enter your room  Cover your coughs and sneezes    Cover your mouth and nose with a tissue when you cough or sneeze  Throw used tissues in a lined trash can  Immediately wash your hands with soap and water for at least 20 seconds or, if soap and water are not available, clean your hands with an alcohol-based hand  that contains at least 60% alcohol  Clean your hands often    Wash your hands often with soap and water for at least 20 seconds, especially after blowing your nose, coughing, or sneezing; going to the bathroom; and before eating or preparing food  If soap and water are not readily available, use an alcohol-based hand  with at least 60% alcohol, covering all surfaces of your hands and rubbing them together until they feel dry  Soap and water are the best option if hands are visibly dirty  Avoid touching your eyes, nose, and mouth with unwashed hands  Avoid sharing personal household items    You should not share dishes, drinking glasses, cups, eating utensils, towels, or bedding with other people or pets in your home  After using these items, they should be washed thoroughly with soap and water  Clean all high-touch surfaces everyday    High touch surfaces include counters, tabletops, doorknobs, bathroom fixtures, toilets, phones, keyboards, tablets, and bedside tables   Also, clean any surfaces that may have blood, stool, or body fluids on them  Use a household cleaning spray or wipe, according to the label instructions  Labels contain instructions for safe and effective use of the cleaning product including precautions you should take when applying the product, such as wearing gloves and making sure you have good ventilation during use of the product  Monitor your symptoms    Seek prompt medical attention if your illness is worsening (e g , difficulty breathing)  Before seeking care, call your healthcare provider and tell them that you have, or are being evaluated for, COVID-19  Put on a facemask before you enter the facility  These steps will help the healthcare providers office to keep other people in the office or waiting room from getting infected or exposed  Ask your healthcare provider to call the local or [facility] health department  Persons who are placed under active monitoring or facilitated self-monitoring should follow instructions provided by their local health department or occupational health professionals, as appropriate  If you have a medical emergency and need to call 911, notify the dispatch personnel that you have, or are being evaluated for COVID-19  If possible, put on a facemask before emergency medical services arrive      Discontinuing home isolation    Patients with confirmed COVID-19 should remain under home isolation precautions until the following conditions are met:   - They have had no fever for at least 24 hours (that is one full day of no fever without the use medicine that reduces fevers)  AND  - other symptoms have improved (for example, when their cough or shortness of breath have improved)  AND  - If had mild or moderate illness, at least 10 days have passed since their symptoms first appeared or if severe illness (needed oxygen) or immunosuppressed, at least 20 days have passed since symptoms first appeared  Patients with confirmed COVID-19 should also notify close contacts (including their workplace) and ask that they self-quarantine  Currently, close contact is defined as being within 6 feet for 15 minutes or more from the period 24 hours starting 48 hours before symptom onset to the time at which the patient went into isolation  Close contacts of patients diagnosed with COVID-19 should be instructed by the patient to self-quarantine for 14 days from the last time of their last contact with the patient       Source: [employer]

## 2022-01-08 NOTE — PLAN OF CARE
Problem: Potential for Falls  Goal: Patient will remain free of falls  Description: INTERVENTIONS:  - Educate patient/family on patient safety including physical limitations  - Instruct patient to call for assistance with activity   - Consult OT/PT to assist with strengthening/mobility   - Keep Call bell within reach  - Keep bed low and locked with side rails adjusted as appropriate  - Keep care items and personal belongings within reach  - Initiate and maintain comfort rounds  - Make Fall Risk Sign visible to staff  - Offer Toileting every 3 Hours, in advance of need  - Initiate/Maintain alarm  - Obtain necessary fall risk management equipment:   - Apply yellow socks and bracelet for high fall risk patients  - Consider moving patient to room near nurses station  Outcome: Progressing     Problem: RESPIRATORY - ADULT  Goal: Achieves optimal ventilation and oxygenation  Description: INTERVENTIONS:  - Assess for changes in respiratory status  - Assess for changes in mentation and behavior  - Position to facilitate oxygenation and minimize respiratory effort  - Oxygen administered by appropriate delivery if ordered  - Initiate smoking cessation education as indicated  - Encourage broncho-pulmonary hygiene including cough, deep breathe, Incentive Spirometry  - Assess the need for suctioning and aspirate as needed  - Assess and instruct to report SOB or any respiratory difficulty  - Respiratory Therapy support as indicated  Outcome: Progressing

## 2022-01-08 NOTE — UTILIZATION REVIEW
Inpatient Admission Authorization Request   NOTIFICATION OF INPATIENT ADMISSION/INPATIENT AUTHORIZATION REQUEST   SERVICING FACILITY:   [place] [address]   [address] [place] [address]  Tax ID: [id number]  NPI: [id number]  Place of Service: Inpatient [address] [facility]  [address]  Place of Service Code: 24     ATTENDING PROVIDER:  Attending Name and NPI#: Janelle Campbell [[id number]]  Address: [address] [place] [address]  Phone: [phone]     UTILIZATION REVIEW CONTACT:  Sera Fong Utilization   Network Utilization Review Department  Phone: [phone]  Fax [phone]  Email: [email or web address]     PHYSICIAN ADVISORY SERVICES:  FOR [id number] REVIEW - MEDICAL NECESSITY DENIAL  Phone: [phone]  Fax: [phone]  Email: [email or web address]     TYPE OF REQUEST:  Inpatient Status     ADMISSION INFORMATION:  ADMISSION DATE/TIME: 1/7/22  3:37 PM  PATIENT DIAGNOSIS CODE/DESCRIPTION:  Dehydration [E86 0]  Singultus [R06 6]  Hyponatremia [E87 1]  Hypoxia [R09 02]  ARIAS (acute kidney injury) ([facility] [address] ) [N17 9]  Flu-like symptoms [R68 89]  Sepsis ([facility] [address] ) [A41 9]  Pneumonia due to COVID-19 virus [U07 1, J12 82]  DISCHARGE DATE/TIME: No discharge date for patient encounter  DISCHARGE DISPOSITION (IF DISCHARGED): Final discharge disposition not confirmed     IMPORTANT INFORMATION:  Please contact the Judge Yeyo Reagan directly with any questions or concerns regarding this request  Department voicemails are confidential     Send requests for admission clinical reviews, concurrent reviews, approvals, and administrative denials due to lack of clinical to fax [phone]

## 2022-01-08 NOTE — DISCHARGE SUMMARY
[address]  Discharge- Stefany Anderson 1955, 74 y o  male MRN: [id number]  Unit/Bed#: [address] [place] [address] Encounter: [id number]  Primary Care Provider: No primary care provider on file  Date and time admitted to hospital: 1/7/2022 12:03 PM    No new Assessment & Plan notes have been filed under this hospital service since the last note was generated  Service: Hospitalist      Medical Problems             Resolved Problems  Date Reviewed: 1/7/2022          Resolved    * (Principal) Acute respiratory failure with hypoxia ([facility] [address] ) 1/8/2022     Resolved by  Vanice Goodell, DO    Elevated serum creatinine 1/8/2022     Resolved by  Vanice Goodell, DO              Discharging Physician / Practitioner: Vanice Goodell, DO  PCP: No primary care provider on file  Admission Date:   Admission Orders (From admission, onward)     Ordered        01/07/22 1537  Inpatient Admission  Once                      Discharge Date: 01/08/22    Consultations During Hospital Stay:  · None    Procedures Performed:   · None    Significant Findings / Test Results:   · CXR: Bibasilar opacities which can represent pneumonia, including COVID 19, in the appropriate clinical setting  Incidental Findings:   · None    Test Results Pending at Discharge (will require follow up): · None     Outpatient Tests Requested:  · None    Complications:  None    Reason for Admission: [address] Course:   Stefany Anderson is a 74 y o  male patient who originally presented to the hospital on 1/7/2022 due to shortness of breath and cough  Patient noted to be COVID positive requiring supplemental oxygen  Patient was initiated on COVID protocol improve throughout hospitalization and was optimized for discharge  Patient did not require home oxygen  Please see above list of diagnoses and related plan for additional information  Condition at Discharge: fair    Discharge Day Visit / Exam:   Subjective:  Patient feeling better  Vitals: Blood Pressure: 128/69 (01/08/22 1500)  Pulse: 92 (01/08/22 1500)  Temperature: 97 7 °F (36 5 °C) (01/08/22 1500)  Temp Source: Oral (01/08/22 1500)  Respirations: 18 (01/08/22 1500)  Height: 6' 3" (190 5 cm) (01/07/22 1136)  Weight - Scale: 90 7 kg (200 lb) (01/07/22 1136)  SpO2: 93 % (01/08/22 1500)  Exam:   Physical Exam  HENT:      Head: Normocephalic and atraumatic  Mouth/Throat:      Mouth: Mucous membranes are moist    Eyes:      Extraocular Movements: Extraocular movements intact  Cardiovascular:      Rate and Rhythm: Normal rate and regular rhythm  Pulmonary:      Effort: Pulmonary effort is normal       Breath sounds: Normal breath sounds  Abdominal:      General: Abdomen is flat  Bowel sounds are normal       Palpations: Abdomen is soft  Tenderness: There is no abdominal tenderness  Musculoskeletal:      Right lower leg: No edema  Left lower leg: No edema  Skin:     General: Skin is warm and dry  Neurological:      Mental Status: He is alert  Mental status is at baseline  Discharge instructions/Information to patient and family:   See after visit summary for information provided to patient and family  Provisions for Follow-Up Care:  See after visit summary for information related to follow-up care and any pertinent home health orders  Disposition:   Home    Planned Readmission:  None     Discharge Statement:  I spent greater than 30 minutes discharging the patient  This time was spent on the day of discharge  I had direct contact with the patient on the day of discharge  Greater than 50% of the total time was spent examining patient, answering all patient questions, arranging and discussing plan of care with patient as well as directly providing post-discharge instructions  Additional time then spent on discharge activities  Discharge Medications:  See after visit summary for reconciled discharge medications provided to patient and/or family  **Please Note: This note may have been constructed using a voice recognition system**

## 2022-01-08 NOTE — UTILIZATION REVIEW
Initial Clinical Review    Admission: Date/Time/Statement:   Admission Orders (From admission, onward)     Ordered        01/07/22 1537  Inpatient Admission  Once                      Orders Placed This Encounter   Procedures    Inpatient Admission     Standing Status:   Standing     Number of Occurrences:   1     Order Specific Question:   Level of Care     Answer:   Med Surg [16]     Order Specific Question:   Estimated length of stay     Answer:   More than 2 Midnights     Order Specific Question:   Certification     Answer:   I certify that inpatient services are medically necessary for this patient for a duration of greater than two midnights  See H&P and MD Progress Notes for additional information about the patient's course of treatment  ED Arrival Information     Expected Arrival Acuity    - 1/7/2022 11:29 Emergent         Means of arrival Escorted by Service Admission type    [facility] Emergency         Arrival complaint    Exp to Covid w/ SOB Flu Like sym        Chief Complaint   Patient presents with    Flu Symptoms     started the 30th with symptoms and now having muscle spasms and hiccups with sob       Initial Presentation:       74year old male presents to ed from home for evaluation and treatment of shortness of breath, hiccups and muscle spasms  PMHX: HTN, NO HOME O2  Clinical assessment significant for hypoxia 89%, fever 102 2, tachypnea  22 to 29 sustained  PROCALCITONIN 0 39, COVID +, , BUN 31, CR 1 57, CRP 91 1 Imaging consistent with bibasilar covid pneumonia  Initially treated with 5L O2nc, iv  9% ns bolus, iv reglan x1, iv decadron, iv remdesivir, sq lovenox, albuterol x1, atrovent x1  Admit to inpatient med surg for covid 19 pneumonia, hypoxic respiratory failure, acute kidney injury ( unknown baseline)  Date: 1-8-22    Day 2: inpatient med surg   Continue supplemental oxygen to maintain O2 at least 90%   Encourage incentive spirometer, trend inflammatory markers and BMP  Continue iv remdesivir, iv decadron, sq lovenox and iv ns 75/hr  ED Triage Vitals   01/07/22 1136 01/07/22 1136 01/07/22 1136 01/07/22 1136 01/07/22 1136   (!) 102 2 °F (39 °C) 105 20 148/79 91 %      Oral Monitor         No Pain          01/07/22 90 7 kg (200 lb)     Additional Vital Signs:       Date/  Time Temp Pulse Resp BP MAP SpO2 Nasal Cannula O2 Flow Rate (L/min) O2 Device   01/08/22 0902 98 3 °F (36 8 °C) 86 17 122/67 -- 95 % -- None (Room air)   01/07/22 2206 98 6 °F (37 °C) 79 12 116/66 86 94 % -- --   01/07/22 1944 -- 82 23   Abnormal  121/72 -- 95 % -- Nasal cannula   01/07/22 1930 -- 78 22 -- -- 96 % -- --   01/07/22 1550 -- -- -- -- -- -- -- Nasal cannula   01/07/22 1533 98 9 °F (37 2 °C) 90 26   Abnormal  98/57 -- 93 % 2 L/min Nasal cannula   01/07/22 1515 -- 87 29   Abnormal  -- -- 91 % 2 L/min Nasal cannula   01/07/22 1357 -- -- -- -- -- 89 %   Abnormal  -- None (Room air)   01/07/22 1316 -- 102 22 123/57 -- 96 % 5 L/min Nasal cannula         Pertinent Labs/Diagnostic Test Results:     XR chest 1 view portable   Final  (01/07 1305)      Bibasilar opacities which can represent pneumonia, including COVID 19, in the appropriate clinical setting             Results from last 7 days   Lab Units 01/07/22  1309   SARS-COV-2  Positive*     Results from last 7 days   Lab Units 01/08/22  0527 01/07/22  1309   WBC Thousand/uL 5 46 7 56   HEMOGLOBIN g/dL 12 0 12 3   HEMATOCRIT % 36 6 37 2   PLATELETS Thousands/uL 381 359   NEUTROS ABS Thousands/µL 4 18 6 09         Results from last 7 days   Lab Units 01/08/22  0527 01/07/22  1309   SODIUM mmol/L 134* 130*   POTASSIUM mmol/L 3 8 3 7   CHLORIDE mmol/L 98* 93*   CO2 mmol/L 25 26   ANION GAP mmol/L 11 11   BUN mg/dL 27* 31*   CREATININE mg/dL 1 10 1 57*   EGFR ml/min/1 73sq m 68 44   CALCIUM mg/dL 8 3 8 3   MAGNESIUM mg/dL  --  2 4     Results from last 7 days   Lab Units 01/08/22  0527 01/07/22  1309   AST U/L 67* 102*   ALT U/L 69 85*   ALK PHOS U/L 110 132*   TOTAL PROTEIN g/dL 6 4 7 2   ALBUMIN g/dL 2 4* 3 0*   TOTAL BILIRUBIN mg/dL 0 46 0 71         Results from last 7 days   Lab Units 01/08/22  0527 01/07/22  1309   GLUCOSE RANDOM mg/dL 131 108       Results from last 7 days   Lab Units 01/07/22  1745 01/07/22  1541 01/07/22  1309   HS TNI 0HR ng/L  --   --  24   HS TNI 2HR ng/L  --  27  --    HSTNI D2 ng/L  --  3  --    HS TNI 4HR ng/L 21  --   --    HSTNI D4 ng/L -3  --   --      Results from last 7 days   Lab Units 01/07/22  1309   D-DIMER QUANTITATIVE ug/ml FEU 3 39*     Results from last 7 days   Lab Units 01/07/22  1309   PROTIME seconds 12 3   INR  0 93   PTT seconds 32         Results from last 7 days   Lab Units 01/07/22  1309   PROCALCITONIN ng/ml 0 39*     Results from last 7 days   Lab Units 01/07/22  1309   LACTIC ACID mmol/L 1 0             Results from last 7 days   Lab Units 01/07/22  1309   NT-PRO BNP pg/mL 115         Results from last 7 days   Lab Units 01/08/22  0527   CRP mg/L 91 1*     Results from last 7 days   Lab Units 01/07/22  1744   CLARITY UA  Clear   COLOR UA  Yellow   SPEC GRAV UA  1 020   PH UA  5 5   GLUCOSE UA mg/dl Negative   KETONES UA mg/dl Trace*   BLOOD UA  Moderate*   PROTEIN UA mg/dl 30 (1+)*   NITRITE UA  Negative   BILIRUBIN UA  Negative   UROBILINOGEN UA E U /dl 0 2   LEUKOCYTES UA  Negative   WBC UA /hpf 0-5   RBC UA /hpf 0-1   BACTERIA UA /hpf Occasional   EPITHELIAL CELLS WET PREP /hpf None Seen     Results from last 7 days   Lab Units 01/07/22  1309   INFLUENZA A PCR  Negative   INFLUENZA B PCR  Negative   RSV PCR  Negative       Results from last 7 days   Lab Units 01/07/22  1309 01/07/22  1300   BLOOD CULTURE  Received in Microbiology Lab  Culture in Progress  Received in Microbiology Lab  Culture in Progress         ED Treatment:   Medication Administration from 01/07/2022 1128 to 01/07/2022 2005       Date/Time Order Dose Route Action     01/07/2022 1333 sodium chloride 0 9 % bolus 500 mL 500 mL Intravenous New Bag     01/07/2022 1335 metoclopramide (REGLAN) injection 10 mg 10 mg Intravenous Given     01/07/2022 1342 dexamethasone (DECADRON) injection 10 mg 10 mg Intravenous Given     01/07/2022 1542 ipratropium (ATROVENT) 0 02 % inhalation solution 0 5 mg 0 5 mg Nebulization Given     01/07/2022 1542 albuterol inhalation solution 5 mg 5 mg Nebulization Given     01/07/2022 1342 acetaminophen (TYLENOL) tablet 650 mg 650 mg Oral Given     01/07/2022 1714 remdesivir (Veklury) 200 mg in sodium chloride 0 9 % 290 mL IVPB 200 mg Intravenous Given     01/07/2022 1614 sodium chloride 0 9 % infusion 75 mL/hr Intravenous New Bag     01/07/2022 1714 enoxaparin (LOVENOX) subcutaneous injection 90 mg 90 mg Subcutaneous Given        Past Medical History:   Diagnosis Date    Hypertension      Present on Admission:   Acute respiratory failure with hypoxia ([facility] )   COVID-19   Hypertension   Elevated serum creatinine   Hyponatremia      Admitting Diagnosis:     Dehydration [E86 0]  Singultus [R06 6]  Hyponatremia [E87 1]  Hypoxia [R09 02]  ARIAS (acute kidney injury) ([facility] ) [N17 9]  Flu-like symptoms [R68 89]  Sepsis ([facility] ) [A41 9]  Pneumonia due to COVID-19 virus [U07 1, J12 82]      Age/Sex: 74 y o  male    Scheduled Medications:      dexamethasone, 6 mg, Intravenous, Q24H  enoxaparin, 1 mg/kg, Subcutaneous, Q12H [facility]  remdesivir, 100 mg, Intravenous, Q24H      Continuous IV Infusions:  sodium chloride, 75 mL/hr, Intravenous, Continuous      PRN Meds:  acetaminophen, 650 mg, Oral, Q6H PRN  albuterol, 2 puff, Inhalation, Q4H PRN        None    Network Utilization Review Department  ATTENTION: Please call with any questions or concerns to [phone] and carefully listen to the prompts so that you are directed to the right person   All voicemails are confidential   Meghann Vargas all requests for admission clinical reviews, approved or denied determinations and any other requests to dedicated fax number below belonging to the campus where the patient is receiving treatment   List of dedicated fax numbers for the Facilities:  [address] DENIALS (Administrative/Medical Necessity) [phone]   [address]  (Maternity/NICU/Pediatrics) [phone]   [address]  [address] [place] [address] [address] [address] [facility] [place] [address] [phone]

## 2022-01-08 NOTE — PLAN OF CARE
Problem: Potential for Falls  Goal: Patient will remain free of falls  Description: INTERVENTIONS:  - Educate patient/family on patient safety including physical limitations  - Instruct patient to call for assistance with activity   - Consult OT/PT to assist with strengthening/mobility   - Keep Call bell within reach  - Keep bed low and locked with side rails adjusted as appropriate  - Keep care items and personal belongings within reach  - Initiate and maintain comfort rounds  - Make Fall Risk Sign visible to staff  - Offer Toileting every 2 Hours, in advance of need  - Initiate/Maintain bed alarm  - Obtain necessary fall risk management equipment: bed alarm   - Apply yellow socks and bracelet for high fall risk patients  - Consider moving patient to room near nurses station  Outcome: Progressing     Problem: RESPIRATORY - ADULT  Goal: Achieves optimal ventilation and oxygenation  Description: INTERVENTIONS:  - Assess for changes in respiratory status  - Assess for changes in mentation and behavior  - Position to facilitate oxygenation and minimize respiratory effort  - Oxygen administered by appropriate delivery if ordered  - Initiate smoking cessation education as indicated  - Encourage broncho-pulmonary hygiene including cough, deep breathe, Incentive Spirometry  - Assess the need for suctioning and aspirate as needed  - Assess and instruct to report SOB or any respiratory difficulty  - Respiratory Therapy support as indicated  Outcome: Progressing

## 2022-01-08 NOTE — RESPIRATORY THERAPY NOTE
Home Oxygen Qualifying Test       Patient name: Jennifer Martinez        : 1955   Date of Test:  2022  Diagnosis:      Home Oxygen Test:    **Medicare Guidelines require item(s) 1-5 on all ambulatory patients or 1 and 2 on non-ambulatory patients  1   Baseline SPO2 on Room Air at rest 93%  2   SPO2 during exercise on Room Air 95 %  During exercise monitor SpO2  If SPO2 increases >=89% with ambulation do not add supplemental             oxygen  If <= 88% on room air add O2 via NC and titrate patient  Patient must be ambulated with O2 and titrated to > 88% with exertion  3   SPO2 on Oxygen at rest NA % NA lpm     4   SPO2 during exercise on Oxygen  NA % a liter flow of NA lpm     5   Exercise performed:          walking          []  Supplemental Home Oxygen is indicated  [x]  Client does not qualify for home oxygen        Respiratory Additional Notes- Ambulated in room due to covid isolation    Saloni Bright, RTRRT/ACCS

## 2022-01-10 LAB
ATRIAL RATE: 95 BPM
P AXIS: 43 DEGREES
PR INTERVAL: 138 MS
QRS AXIS: 14 DEGREES
QRSD INTERVAL: 104 MS
QT INTERVAL: 356 MS
QTC INTERVAL: 447 MS
T WAVE AXIS: 40 DEGREES
VENTRICULAR RATE: 95 BPM

## 2022-01-10 PROCEDURE — 93010 ELECTROCARDIOGRAM REPORT: CPT | Performed by: INTERNAL MEDICINE

## 2022-01-11 NOTE — UTILIZATION REVIEW
Notification of Discharge   This is a Notification of Discharge from our facility [address]  Please be advised that this patient has been discharge from our facility  Below you will find the admission and discharge date and time including the patients disposition  UTILIZATION REVIEW CONTACT:  Carmela Huang  Utilization   Network Utilization Review Department  Phone: [phone] x carefully listen to the prompts  All voicemails are confidential   Email: [email or web address]     PHYSICIAN ADVISORY SERVICES:  FOR [id number] REVIEW - MEDICAL NECESSITY DENIAL  Phone: [phone]  Fax: [phone]  Email: [email or web address]     PRESENTATION DATE: 1/7/2022 12:03 PM  OBERVATION ADMISSION DATE:   INPATIENT ADMISSION DATE: 1/7/22  3:37 PM   DISCHARGE DATE: 1/8/2022  6:59 PM  DISPOSITION: Home/Self Care Home/Self Care      IMPORTANT INFORMATION:  Send all requests for admission clinical reviews, approved or denied determinations and any other requests to dedicated fax number below belonging to the campus where the patient is receiving treatment   List of dedicated fax numbers:  [address] [place] [address] DENIALS (Administrative/Medical Necessity) [phone]   [address] [place] [address] (Maternity/NICU/Pediatrics) [phone]   Madyson Salmon [phone]   Javi Medina [phone]   Marsha Dalton [phone]   [place] [facility] [address]  [phone]   [facility]  [phone]   [address] [facility]  [address] [facility] [address] [facility] [phone]

## 2022-01-12 ENCOUNTER — HOSPITAL ENCOUNTER (INPATIENT)
Facility: HOSPITAL | Age: 67
LOS: 6 days | Discharge: HOME | DRG: 177 | End: 2022-01-18
Attending: EMERGENCY MEDICINE | Admitting: FAMILY MEDICINE
Payer: COMMERCIAL

## 2022-01-12 ENCOUNTER — APPOINTMENT (EMERGENCY)
Dept: RADIOLOGY | Facility: HOSPITAL | Age: 67
DRG: 177 | End: 2022-01-12
Payer: COMMERCIAL

## 2022-01-12 DIAGNOSIS — R09.02 HYPOXIA: Primary | ICD-10-CM

## 2022-01-12 DIAGNOSIS — U07.1 PNEUMONIA DUE TO COVID-19 VIRUS: ICD-10-CM

## 2022-01-12 DIAGNOSIS — J12.82 PNEUMONIA DUE TO COVID-19 VIRUS: ICD-10-CM

## 2022-01-12 DIAGNOSIS — I82.403 ACUTE DEEP VEIN THROMBOSIS (DVT) OF BOTH LOWER EXTREMITIES (HCC): ICD-10-CM

## 2022-01-12 DIAGNOSIS — E87.1 HYPONATREMIA: ICD-10-CM

## 2022-01-12 PROBLEM — J96.00 ACUTE RESPIRATORY FAILURE (HCC): Status: ACTIVE | Noted: 2022-01-12

## 2022-01-12 PROBLEM — D72.829 LEUKOCYTOSIS: Status: ACTIVE | Noted: 2022-01-12

## 2022-01-12 PROBLEM — R06.6 HICCUPS: Status: ACTIVE | Noted: 2022-01-12

## 2022-01-12 PROBLEM — J96.01 ACUTE RESPIRATORY FAILURE WITH HYPOXIA (HCC): Status: ACTIVE | Noted: 2022-01-12

## 2022-01-12 LAB
2HR DELTA HS TROPONIN: 0 NG/L
4HR DELTA HS TROPONIN: 1 NG/L
ALBUMIN SERPL BCP-MCNC: 2.4 G/DL (ref 3.5–5)
ALP SERPL-CCNC: 120 U/L (ref 46–116)
ALT SERPL W P-5'-P-CCNC: 73 U/L (ref 12–78)
ANION GAP SERPL CALCULATED.3IONS-SCNC: 7 MMOL/L (ref 4–13)
APTT PPP: 28 SECONDS (ref 23–37)
APTT PPP: 29 SECONDS (ref 23–37)
AST SERPL W P-5'-P-CCNC: 46 U/L (ref 5–45)
BACTERIA BLD CULT: NORMAL
BACTERIA BLD CULT: NORMAL
BASOPHILS # BLD MANUAL: 0 THOUSAND/UL (ref 0–0.1)
BASOPHILS NFR MAR MANUAL: 0 % (ref 0–1)
BILIRUB SERPL-MCNC: 0.64 MG/DL (ref 0.2–1)
BUN SERPL-MCNC: 23 MG/DL (ref 5–25)
CALCIUM ALBUM COR SERPL-MCNC: 9.6 MG/DL (ref 8.3–10.1)
CALCIUM SERPL-MCNC: 8.3 MG/DL (ref 8.3–10.1)
CARDIAC TROPONIN I PNL SERPL HS: 10 NG/L
CARDIAC TROPONIN I PNL SERPL HS: 9 NG/L
CARDIAC TROPONIN I PNL SERPL HS: 9 NG/L
CHLORIDE SERPL-SCNC: 94 MMOL/L (ref 100–108)
CO2 SERPL-SCNC: 26 MMOL/L (ref 21–32)
CREAT SERPL-MCNC: 1.14 MG/DL (ref 0.6–1.3)
CRP SERPL QL: 165.7 MG/L
D DIMER PPP FEU-MCNC: 6.72 UG/ML FEU
EOSINOPHIL # BLD MANUAL: 0 THOUSAND/UL (ref 0–0.4)
EOSINOPHIL NFR BLD MANUAL: 0 % (ref 0–6)
ERYTHROCYTE [DISTWIDTH] IN BLOOD BY AUTOMATED COUNT: 14 % (ref 11.6–15.1)
GFR SERPL CREATININE-BSD FRML MDRD: 65 ML/MIN/1.73SQ M
GLUCOSE SERPL-MCNC: 129 MG/DL (ref 65–140)
HCT VFR BLD AUTO: 35.5 % (ref 36.5–49.3)
HGB BLD-MCNC: 11.8 G/DL (ref 12–17)
INR PPP: 1.06 (ref 0.84–1.19)
LYMPHOCYTES # BLD AUTO: 0.31 THOUSAND/UL (ref 0.6–4.47)
LYMPHOCYTES # BLD AUTO: 2 % (ref 14–44)
MCH RBC QN AUTO: 28 PG (ref 26.8–34.3)
MCHC RBC AUTO-ENTMCNC: 33.2 G/DL (ref 31.4–37.4)
MCV RBC AUTO: 84 FL (ref 82–98)
METAMYELOCYTES NFR BLD MANUAL: 1 % (ref 0–1)
MONOCYTES # BLD AUTO: 1.25 THOUSAND/UL (ref 0–1.22)
MONOCYTES NFR BLD: 8 % (ref 4–12)
MYELOCYTES NFR BLD MANUAL: 3 % (ref 0–1)
NEUTROPHILS # BLD MANUAL: 13.25 THOUSAND/UL (ref 1.85–7.62)
NEUTS SEG NFR BLD AUTO: 85 % (ref 43–75)
NT-PROBNP SERPL-MCNC: 243 PG/ML
PLATELET # BLD AUTO: 466 THOUSANDS/UL (ref 149–390)
PLATELET BLD QL SMEAR: ABNORMAL
PMV BLD AUTO: 8.9 FL (ref 8.9–12.7)
POTASSIUM SERPL-SCNC: 4.1 MMOL/L (ref 3.5–5.3)
PROT SERPL-MCNC: 6.8 G/DL (ref 6.4–8.2)
PROTHROMBIN TIME: 13.6 SECONDS (ref 11.6–14.5)
RBC # BLD AUTO: 4.22 MILLION/UL (ref 3.88–5.62)
RBC MORPH BLD: NORMAL
SODIUM SERPL-SCNC: 127 MMOL/L (ref 136–145)
URATE SERPL-MCNC: 4.5 MG/DL (ref 4.2–8)
VARIANT LYMPHS # BLD AUTO: 1 %
WBC # BLD AUTO: 15.59 THOUSAND/UL (ref 4.31–10.16)

## 2022-01-12 PROCEDURE — 85730 THROMBOPLASTIN TIME PARTIAL: CPT | Performed by: NURSE PRACTITIONER

## 2022-01-12 PROCEDURE — XW033E5 INTRODUCTION OF REMDESIVIR ANTI-INFECTIVE INTO PERIPHERAL VEIN, PERCUTANEOUS APPROACH, NEW TECHNOLOGY GROUP 5: ICD-10-PCS | Performed by: INTERNAL MEDICINE

## 2022-01-12 PROCEDURE — 93005 ELECTROCARDIOGRAM TRACING: CPT

## 2022-01-12 PROCEDURE — 84484 ASSAY OF TROPONIN QUANT: CPT | Performed by: NURSE PRACTITIONER

## 2022-01-12 PROCEDURE — 85007 BL SMEAR W/DIFF WBC COUNT: CPT | Performed by: EMERGENCY MEDICINE

## 2022-01-12 PROCEDURE — 86140 C-REACTIVE PROTEIN: CPT | Performed by: NURSE PRACTITIONER

## 2022-01-12 PROCEDURE — 84484 ASSAY OF TROPONIN QUANT: CPT | Performed by: EMERGENCY MEDICINE

## 2022-01-12 PROCEDURE — 99285 EMERGENCY DEPT VISIT HI MDM: CPT

## 2022-01-12 PROCEDURE — 83880 ASSAY OF NATRIURETIC PEPTIDE: CPT | Performed by: EMERGENCY MEDICINE

## 2022-01-12 PROCEDURE — 84300 ASSAY OF URINE SODIUM: CPT | Performed by: NURSE PRACTITIONER

## 2022-01-12 PROCEDURE — 85610 PROTHROMBIN TIME: CPT | Performed by: EMERGENCY MEDICINE

## 2022-01-12 PROCEDURE — 85379 FIBRIN DEGRADATION QUANT: CPT | Performed by: EMERGENCY MEDICINE

## 2022-01-12 PROCEDURE — 85730 THROMBOPLASTIN TIME PARTIAL: CPT | Performed by: EMERGENCY MEDICINE

## 2022-01-12 PROCEDURE — 99222 1ST HOSP IP/OBS MODERATE 55: CPT | Performed by: FAMILY MEDICINE

## 2022-01-12 PROCEDURE — 85027 COMPLETE CBC AUTOMATED: CPT | Performed by: EMERGENCY MEDICINE

## 2022-01-12 PROCEDURE — 36415 COLL VENOUS BLD VENIPUNCTURE: CPT | Performed by: EMERGENCY MEDICINE

## 2022-01-12 PROCEDURE — 83935 ASSAY OF URINE OSMOLALITY: CPT | Performed by: NURSE PRACTITIONER

## 2022-01-12 PROCEDURE — 84550 ASSAY OF BLOOD/URIC ACID: CPT | Performed by: NURSE PRACTITIONER

## 2022-01-12 PROCEDURE — G1004 CDSM NDSC: HCPCS

## 2022-01-12 PROCEDURE — 80053 COMPREHEN METABOLIC PANEL: CPT | Performed by: EMERGENCY MEDICINE

## 2022-01-12 PROCEDURE — 71275 CT ANGIOGRAPHY CHEST: CPT

## 2022-01-12 PROCEDURE — 99285 EMERGENCY DEPT VISIT HI MDM: CPT | Performed by: EMERGENCY MEDICINE

## 2022-01-12 RX ORDER — SODIUM CHLORIDE 9 MG/ML
50 INJECTION, SOLUTION INTRAVENOUS CONTINUOUS
Status: DISCONTINUED | OUTPATIENT
Start: 2022-01-12 | End: 2022-01-13

## 2022-01-12 RX ORDER — HEPARIN SODIUM 1000 [USP'U]/ML
4000 INJECTION, SOLUTION INTRAVENOUS; SUBCUTANEOUS ONCE
Status: COMPLETED | OUTPATIENT
Start: 2022-01-12 | End: 2022-01-12

## 2022-01-12 RX ORDER — HEPARIN SODIUM 1000 [USP'U]/ML
4000 INJECTION, SOLUTION INTRAVENOUS; SUBCUTANEOUS
Status: DISCONTINUED | OUTPATIENT
Start: 2022-01-12 | End: 2022-01-13

## 2022-01-12 RX ORDER — HEPARIN SODIUM 10000 [USP'U]/100ML
3-20 INJECTION, SOLUTION INTRAVENOUS
Status: DISCONTINUED | OUTPATIENT
Start: 2022-01-12 | End: 2022-01-13

## 2022-01-12 RX ORDER — HEPARIN SODIUM 1000 [USP'U]/ML
2000 INJECTION, SOLUTION INTRAVENOUS; SUBCUTANEOUS
Status: DISCONTINUED | OUTPATIENT
Start: 2022-01-12 | End: 2022-01-13

## 2022-01-12 RX ORDER — GUAIFENESIN 600 MG
600 TABLET, EXTENDED RELEASE 12 HR ORAL EVERY 12 HOURS SCHEDULED
Status: DISCONTINUED | OUTPATIENT
Start: 2022-01-12 | End: 2022-01-18 | Stop reason: HOSPADM

## 2022-01-12 RX ORDER — DEXAMETHASONE SODIUM PHOSPHATE 4 MG/ML
6 INJECTION, SOLUTION INTRA-ARTICULAR; INTRALESIONAL; INTRAMUSCULAR; INTRAVENOUS; SOFT TISSUE EVERY 24 HOURS
Status: DISCONTINUED | OUTPATIENT
Start: 2022-01-13 | End: 2022-01-18 | Stop reason: HOSPADM

## 2022-01-12 RX ORDER — ALBUTEROL SULFATE 90 UG/1
2 AEROSOL, METERED RESPIRATORY (INHALATION) EVERY 6 HOURS PRN
Status: DISCONTINUED | OUTPATIENT
Start: 2022-01-12 | End: 2022-01-18 | Stop reason: HOSPADM

## 2022-01-12 RX ORDER — CEFTRIAXONE 1 G/50ML
1000 INJECTION, SOLUTION INTRAVENOUS EVERY 24 HOURS
Status: DISCONTINUED | OUTPATIENT
Start: 2022-01-12 | End: 2022-01-13

## 2022-01-12 RX ORDER — METOCLOPRAMIDE HYDROCHLORIDE 5 MG/ML
5 INJECTION INTRAMUSCULAR; INTRAVENOUS EVERY 6 HOURS PRN
Status: DISCONTINUED | OUTPATIENT
Start: 2022-01-12 | End: 2022-01-18 | Stop reason: HOSPADM

## 2022-01-12 RX ORDER — ACETAMINOPHEN 325 MG/1
650 TABLET ORAL EVERY 6 HOURS PRN
Status: DISCONTINUED | OUTPATIENT
Start: 2022-01-12 | End: 2022-01-18 | Stop reason: HOSPADM

## 2022-01-12 RX ORDER — FAMOTIDINE 20 MG/1
20 TABLET, FILM COATED ORAL 2 TIMES DAILY
Status: DISCONTINUED | OUTPATIENT
Start: 2022-01-12 | End: 2022-01-18 | Stop reason: HOSPADM

## 2022-01-12 RX ORDER — DOXYCYCLINE HYCLATE 100 MG/1
100 CAPSULE ORAL EVERY 12 HOURS SCHEDULED
Status: DISCONTINUED | OUTPATIENT
Start: 2022-01-12 | End: 2022-01-13

## 2022-01-12 RX ORDER — SACCHAROMYCES BOULARDII 250 MG
250 CAPSULE ORAL 2 TIMES DAILY
Status: DISCONTINUED | OUTPATIENT
Start: 2022-01-12 | End: 2022-01-13

## 2022-01-12 RX ADMIN — GUAIFENESIN 600 MG: 600 TABLET, EXTENDED RELEASE ORAL at 21:37

## 2022-01-12 RX ADMIN — IOHEXOL 85 ML: 350 INJECTION, SOLUTION INTRAVENOUS at 17:02

## 2022-01-12 RX ADMIN — SODIUM CHLORIDE 50 ML/HR: 0.9 INJECTION, SOLUTION INTRAVENOUS at 21:36

## 2022-01-12 RX ADMIN — REMDESIVIR 200 MG: 100 INJECTION, POWDER, LYOPHILIZED, FOR SOLUTION INTRAVENOUS at 20:30

## 2022-01-12 RX ADMIN — Medication 250 MG: at 21:37

## 2022-01-12 RX ADMIN — FAMOTIDINE 20 MG: 20 TABLET ORAL at 21:37

## 2022-01-12 RX ADMIN — HEPARIN SODIUM 11.1 UNITS/KG/HR: 10000 INJECTION, SOLUTION INTRAVENOUS at 21:54

## 2022-01-12 RX ADMIN — DOXYCYCLINE 100 MG: 100 CAPSULE ORAL at 21:37

## 2022-01-12 RX ADMIN — CEFTRIAXONE 1000 MG: 1 INJECTION, SOLUTION INTRAVENOUS at 21:36

## 2022-01-12 RX ADMIN — HEPARIN SODIUM 4000 UNITS: 1000 INJECTION INTRAVENOUS; SUBCUTANEOUS at 21:54

## 2022-01-12 NOTE — LETTER
[address] [facility] [address]  T[address] Tellez  [place] [address]  Dept: [phone]    January 18, 2022     Patient: Marilou Lea   YOB: 1955   Date of Visit: 1/12/2022       To Whom it May Concern:    Loraine Boyce is under my professional care  He was seen in the hospital from 1/12/2022   to 01/18/22  He should be seen by primary care provider in 1 week who can clear him to return to work after evaluation  If you have any questions or concerns, please don't hesitate to call           Sincerely,          Argenis Leyva MD

## 2022-01-12 NOTE — ED PROVIDER NOTES
History  Chief Complaint   Patient presents with    Decreased Oxygen Level     admitted on Jan 7 with covid and low oxygen  got o2 sensor today and noted to be low again  states some shortness of breath over last day or so     73 yo male is 13 days into covid infection  Had been admitted overnight 5 days ago for sob and borderline hypoxia  Had been doing ok at home until yesterday developed worsened sob again  He got a pulse oximeter today and found his oxygen level to be 82-85% on room air, confirmed by his PCP who saw the results on picture  Pulse ox 89% here in ER at rest, up to 94% on 2L nasal cannula  Pt  Denies fever  No vomiting or diarrhea  No chest pain   + mild cough, non-productive  History provided by:  Patient   used: No        Prior to Admission Medications   Prescriptions Last Dose Informant Patient Reported? Taking? albuterol (PROVENTIL HFA,VENTOLIN HFA) 90 mcg/act inhaler  Self Yes No   Sig: Inhale 2 puffs every 6 (six) hours as needed for wheezing   dexamethasone (DECADRON) 6 mg tablet   No No   Sig: Take 1 tablet (6 mg total) by mouth in the morning   guaifenesin-codeine (GUAIFENESIN AC) 100-10 MG/5ML liquid  Self Yes No   Sig: Take 5 mL by mouth 3 (three) times a day as needed for cough   lisinopril-hydrochlorothiazide (PRINZIDE,ZESTORETIC) 10-12 5 MG per tablet  Self Yes No   Sig: Take 1 tablet by mouth daily      Facility-Administered Medications: None       Past Medical History:   Diagnosis Date    Hypertension        History reviewed  No pertinent surgical history  History reviewed  No pertinent family history  I have reviewed and agree with the history as documented      E-Cigarette/Vaping    E-Cigarette Use Never User      E-Cigarette/Vaping Substances     Social History     Tobacco Use    Smoking status: Never Smoker    Smokeless tobacco: Never Used   Vaping Use    Vaping Use: Never used   Substance Use Topics    Alcohol use: Never    Drug use: Not on file       Review of Systems   Constitutional: Negative  Negative for chills and fever  HENT: Negative  Negative for congestion and sore throat  Eyes: Negative  Respiratory: Positive for chest tightness and shortness of breath  Negative for cough  Cardiovascular: Negative  Negative for chest pain and leg swelling  Gastrointestinal: Negative  Negative for abdominal pain, diarrhea, nausea and vomiting  Genitourinary: Negative  Negative for dysuria, flank pain and hematuria  Musculoskeletal: Negative  Negative for back pain and myalgias  Skin: Negative  Negative for rash and wound  Neurological: Positive for weakness  Negative for dizziness and headaches  Psychiatric/Behavioral: Negative  Negative for confusion and hallucinations  The patient is not nervous/anxious  All other systems reviewed and are negative  Physical Exam  Physical Exam  Vitals and nursing note reviewed  Constitutional:       General: He is not in acute distress  Appearance: He is well-developed  He is not ill-appearing, toxic-appearing or diaphoretic  Comments: Taken off O2 during my H&P and pulse ox did drop to 88-89% on RA  HENT:      Head: Normocephalic and atraumatic  Right Ear: External ear normal       Left Ear: External ear normal    Eyes:      General: No scleral icterus  Conjunctiva/sclera: Conjunctivae normal    Cardiovascular:      Rate and Rhythm: Regular rhythm  Tachycardia present  Heart sounds: Normal heart sounds  No murmur heard  Pulmonary:      Effort: Pulmonary effort is normal  No respiratory distress  Breath sounds: Decreased breath sounds present  No wheezing  Chest:      Chest wall: No tenderness  Abdominal:      General: Bowel sounds are normal  There is no distension  Palpations: Abdomen is soft  Tenderness: There is no abdominal tenderness  Musculoskeletal:         General: No tenderness or deformity  Normal range of motion  Cervical back: Normal range of motion and neck supple  Right lower leg: No edema  Left lower leg: No edema  Skin:     General: Skin is warm and dry  Coloration: Skin is not pale  Findings: No erythema or rash  Neurological:      General: No focal deficit present  Mental Status: He is alert and oriented to person, place, and time  Cranial Nerves: No cranial nerve deficit  Motor: No weakness     Psychiatric:         Mood and Affect: Mood normal          Behavior: Behavior normal          Vital Signs  ED Triage Vitals [01/12/22 1531]   Temperature Pulse Respirations Blood Pressure SpO2   (!) 97 °F (36 1 °C) (!) 112 (!) 24 119/66 (!) 89 %      Temp Source Heart Rate Source Patient Position - Orthostatic VS BP Location FiO2 (%)   Tympanic Monitor Sitting Right arm --      Pain Score       No Pain           Vitals:    01/12/22 1531 01/12/22 1623 01/12/22 1630 01/12/22 1645   BP: 119/66  121/76    Pulse: (!) 112 94 94 90   Patient Position - Orthostatic VS: Sitting            Visual Acuity      ED Medications  Medications   iohexol (OMNIPAQUE) 350 MG/ML injection (SINGLE-DOSE) 85 mL (85 mL Intravenous Given 1/12/22 1702)       Diagnostic Studies  Results Reviewed     Procedure Component Value Units Date/Time    D-Dimer [[id number]]  (Abnormal) Collected: 01/12/22 1617    Lab Status: Final result Specimen: Blood from Arm, Left Updated: 01/12/22 1651     D-Dimer, Quant 6 72 ug/ml FEU     HS Troponin 0hr (reflex protocol) [[id number]]  (Normal) Collected: 01/12/22 1617    Lab Status: Final result Specimen: Blood from Arm, Left Updated: 01/12/22 1645     hs TnI 0hr 9 ng/L     HS Troponin I 2hr [[id number]]     Lab Status: No result Specimen: Blood     NT-BNP PRO [[id number]]  (Abnormal) Collected: 01/12/22 1617    Lab Status: Final result Specimen: Blood from Arm, Left Updated: 01/12/22 1644     NT-proBNP 243 pg/mL     Comprehensive metabolic panel [[id number]]  (Abnormal) Collected: 01/12/22 1617    Lab Status: Final result Specimen: Blood from Arm, Left Updated: 01/12/22 1644     Sodium 127 mmol/L      Potassium 4 1 mmol/L      Chloride 94 mmol/L      CO2 26 mmol/L      ANION GAP 7 mmol/L      BUN 23 mg/dL      Creatinine 1 14 mg/dL      Glucose 129 mg/dL      Calcium 8 3 mg/dL      Corrected Calcium 9 6 mg/dL      AST 46 U/L      ALT 73 U/L      Alkaline Phosphatase 120 U/L      Total Protein 6 8 g/dL      Albumin 2 4 g/dL      Total Bilirubin 0 64 mg/dL      eGFR 65 ml/min/1 73sq m     Narrative:      [place] guidelines for Chronic Kidney Disease (CKD):     Stage 1 with normal or high GFR (GFR > 90 mL/min/1 73 square meters)    Stage 2 Mild CKD (GFR = 60-89 mL/min/1 73 square meters)    Stage 3A Moderate CKD (GFR = 45-59 mL/min/1 73 square meters)    Stage 3B Moderate CKD (GFR = 30-44 mL/min/1 73 square meters)    Stage 4 Severe CKD (GFR = 15-29 mL/min/1 73 square meters)    Stage 5 End Stage CKD (GFR <15 mL/min/1 73 square meters)  Note: GFR calculation is accurate only with a steady state creatinine    CBC and differential [[id number]]  (Abnormal) Collected: 01/12/22 1617    Lab Status: Final result Specimen: Blood from Arm, Left Updated: 01/12/22 1642     WBC 15 59 Thousand/uL      RBC 4 22 Million/uL      Hemoglobin 11 8 g/dL      Hematocrit 35 5 %      MCV 84 fL      MCH 28 0 pg      MCHC 33 2 g/dL      RDW 14 0 %      MPV 8 9 fL      Platelets 668 Thousands/uL     Narrative: This is an appended report  These results have been appended to a previously verified report      Manual Differential(PHLEBS Do Not Order) [[id number]]  (Abnormal) Collected: 01/12/22 1617    Lab Status: Final result Specimen: Blood from Arm, Left Updated: 01/12/22 1642     Segmented % 85 %      Lymphocytes % 2 %      Monocytes % 8 %      Eosinophils, % 0 %      Basophils % 0 %      Metamyelocytes% 1 %      Myelocytes % 3 %      Atypical Lymphocytes % 1 %      Absolute Neutrophils 13 25 Thousand/uL      Lymphocytes Absolute 0 31 Thousand/uL      Monocytes Absolute 1 25 Thousand/uL      Eosinophils Absolute 0 00 Thousand/uL      Basophils Absolute 0 00 Thousand/uL      Total Counted --     RBC Morphology Normal     Platelet Estimate Increased    Protime-INR [[id number]]  (Normal) Collected: 01/12/22 1617    Lab Status: Final result Specimen: Blood from Arm, Left Updated: 01/12/22 1633     Protime 13 6 seconds      INR 1 06    APTT [[id number]]  (Normal) Collected: 01/12/22 1617    Lab Status: Final result Specimen: Blood from Arm, Left Updated: 01/12/22 1633     PTT 29 seconds                  CTA ED chest PE study   Final Result by Karlee Wallace MD (01/12 1727)         1  Findings compatible with Covid-19 pneumonia  2    No evidence of acute pulmonary embolus  Mild aneurysmal dilatation of the ascending aorta measuring 3 9 cm                 Workstation performed: [id number]                    Procedures  ECG 12 Lead Documentation Only    Date/Time: 1/12/2022 4:32 PM  Performed by: Lucretia Galeano MD  Authorized by: Lucretia Galeano MD     Indications / Diagnosis:  Sob  ECG reviewed by me, the ED Provider: yes    Patient location:  ED  Previous ECG:     Previous ECG:  Compared to current    Similarity:  No change  Interpretation:     Interpretation: normal    Rate:     ECG rate:  96    ECG rate assessment: normal    Rhythm:     Rhythm: sinus rhythm    Ectopy:     Ectopy: none    QRS:     QRS axis:  Normal  Conduction:     Conduction: normal    ST segments:     ST segments:  Normal  T waves:     T waves: normal               ED Course                                             MDM    Disposition  Final diagnoses:   Hypoxia   Pneumonia due to COVID-19 virus     Time reflects when diagnosis was documented in both MDM as applicable and the Disposition within this note     Time User Action Codes Description Comment    5/38/6539  0:80 PM Mira Banks Add [N99 57] Hypoxia 2/84/9947  9:12 PM Abigail Code Elio Found Add [H12 5,  J12 82] Pneumonia due to COVID-19 virus       ED Disposition     ED Disposition Condition Date/Time Comment    Admit Stable Wed Jan 12, 2022  5:47 PM Case was discussed with *hospitalist** and the patient's admission status was agreed to be Admission Status: inpatient status       Follow-up Information    None         Patient's Medications   Discharge Prescriptions    No medications on file       No discharge procedures on file      PDMP Review       Value Time User    PDMP Reviewed  Yes 1/8/2022  3:16 PM Kathleen Jain DO          ED Provider  Electronically Signed by           Elaina Ndiaye MD  03/48/16 7608

## 2022-01-13 ENCOUNTER — APPOINTMENT (INPATIENT)
Dept: RADIOLOGY | Facility: HOSPITAL | Age: 67
DRG: 177 | End: 2022-01-13
Payer: COMMERCIAL

## 2022-01-13 PROBLEM — I82.403 ACUTE DEEP VEIN THROMBOSIS (DVT) OF BOTH LOWER EXTREMITIES (HCC): Status: ACTIVE | Noted: 2022-01-13

## 2022-01-13 LAB
ALBUMIN SERPL BCP-MCNC: 2.1 G/DL (ref 3.5–5)
ALP SERPL-CCNC: 99 U/L (ref 46–116)
ALT SERPL W P-5'-P-CCNC: 58 U/L (ref 12–78)
ANION GAP SERPL CALCULATED.3IONS-SCNC: 10 MMOL/L (ref 4–13)
ANION GAP SERPL CALCULATED.3IONS-SCNC: 11 MMOL/L (ref 4–13)
ANION GAP SERPL CALCULATED.3IONS-SCNC: 9 MMOL/L (ref 4–13)
APTT PPP: 39 SECONDS (ref 23–37)
APTT PPP: 50 SECONDS (ref 23–37)
APTT PPP: 58 SECONDS (ref 23–37)
APTT PPP: 74 SECONDS (ref 23–37)
AST SERPL W P-5'-P-CCNC: 34 U/L (ref 5–45)
ATRIAL RATE: 96 BPM
BILIRUB SERPL-MCNC: 0.39 MG/DL (ref 0.2–1)
BUN SERPL-MCNC: 22 MG/DL (ref 5–25)
BUN SERPL-MCNC: 23 MG/DL (ref 5–25)
BUN SERPL-MCNC: 24 MG/DL (ref 5–25)
CALCIUM ALBUM COR SERPL-MCNC: 9.6 MG/DL (ref 8.3–10.1)
CALCIUM SERPL-MCNC: 7.8 MG/DL (ref 8.3–10.1)
CALCIUM SERPL-MCNC: 8.1 MG/DL (ref 8.3–10.1)
CALCIUM SERPL-MCNC: 8.1 MG/DL (ref 8.3–10.1)
CHLORIDE SERPL-SCNC: 96 MMOL/L (ref 100–108)
CHLORIDE SERPL-SCNC: 97 MMOL/L (ref 100–108)
CHLORIDE SERPL-SCNC: 98 MMOL/L (ref 100–108)
CK SERPL-CCNC: 46 U/L (ref 39–308)
CO2 SERPL-SCNC: 23 MMOL/L (ref 21–32)
CO2 SERPL-SCNC: 25 MMOL/L (ref 21–32)
CO2 SERPL-SCNC: 25 MMOL/L (ref 21–32)
CREAT SERPL-MCNC: 0.97 MG/DL (ref 0.6–1.3)
CREAT SERPL-MCNC: 1.01 MG/DL (ref 0.6–1.3)
CREAT SERPL-MCNC: 1.07 MG/DL (ref 0.6–1.3)
CRP SERPL QL: 135.3 MG/L
D DIMER PPP FEU-MCNC: 7.73 UG/ML FEU
ERYTHROCYTE [DISTWIDTH] IN BLOOD BY AUTOMATED COUNT: 14 % (ref 11.6–15.1)
GFR SERPL CREATININE-BSD FRML MDRD: 70 ML/MIN/1.73SQ M
GFR SERPL CREATININE-BSD FRML MDRD: 76 ML/MIN/1.73SQ M
GFR SERPL CREATININE-BSD FRML MDRD: 79 ML/MIN/1.73SQ M
GLUCOSE SERPL-MCNC: 123 MG/DL (ref 65–140)
GLUCOSE SERPL-MCNC: 144 MG/DL (ref 65–140)
GLUCOSE SERPL-MCNC: 99 MG/DL (ref 65–140)
HCT VFR BLD AUTO: 33 % (ref 36.5–49.3)
HGB BLD-MCNC: 10.6 G/DL (ref 12–17)
MAGNESIUM SERPL-MCNC: 1.8 MG/DL (ref 1.6–2.6)
MCH RBC QN AUTO: 27.8 PG (ref 26.8–34.3)
MCHC RBC AUTO-ENTMCNC: 32.1 G/DL (ref 31.4–37.4)
MCV RBC AUTO: 87 FL (ref 82–98)
OSMOLALITY UR/SERPL-RTO: 280 MMOL/KG (ref 282–298)
OSMOLALITY UR: 699 MMOL/KG
P AXIS: 62 DEGREES
PLATELET # BLD AUTO: 421 THOUSANDS/UL (ref 149–390)
PMV BLD AUTO: 9 FL (ref 8.9–12.7)
POTASSIUM SERPL-SCNC: 3.7 MMOL/L (ref 3.5–5.3)
POTASSIUM SERPL-SCNC: 3.8 MMOL/L (ref 3.5–5.3)
POTASSIUM SERPL-SCNC: 4.4 MMOL/L (ref 3.5–5.3)
PR INTERVAL: 138 MS
PROCALCITONIN SERPL-MCNC: 0.24 NG/ML
PROCALCITONIN SERPL-MCNC: 0.24 NG/ML
PROT SERPL-MCNC: 6 G/DL (ref 6.4–8.2)
QRS AXIS: 30 DEGREES
QRSD INTERVAL: 90 MS
QT INTERVAL: 336 MS
QTC INTERVAL: 424 MS
RBC # BLD AUTO: 3.81 MILLION/UL (ref 3.88–5.62)
SODIUM 24H UR-SCNC: 71 MOL/L
SODIUM SERPL-SCNC: 130 MMOL/L (ref 136–145)
SODIUM SERPL-SCNC: 131 MMOL/L (ref 136–145)
SODIUM SERPL-SCNC: 132 MMOL/L (ref 136–145)
SODIUM SERPL-SCNC: 132 MMOL/L (ref 136–145)
T WAVE AXIS: 37 DEGREES
T4 FREE SERPL-MCNC: 1.25 NG/DL (ref 0.76–1.46)
TSH SERPL DL<=0.05 MIU/L-ACNC: 1.81 UIU/ML (ref 0.36–3.74)
VENTRICULAR RATE: 96 BPM
WBC # BLD AUTO: 14.43 THOUSAND/UL (ref 4.31–10.16)

## 2022-01-13 PROCEDURE — 86140 C-REACTIVE PROTEIN: CPT | Performed by: NURSE PRACTITIONER

## 2022-01-13 PROCEDURE — 84145 PROCALCITONIN (PCT): CPT | Performed by: NURSE PRACTITIONER

## 2022-01-13 PROCEDURE — 94640 AIRWAY INHALATION TREATMENT: CPT

## 2022-01-13 PROCEDURE — 99232 SBSQ HOSP IP/OBS MODERATE 35: CPT | Performed by: NURSE PRACTITIONER

## 2022-01-13 PROCEDURE — 94760 N-INVAS EAR/PLS OXIMETRY 1: CPT

## 2022-01-13 PROCEDURE — 85379 FIBRIN DEGRADATION QUANT: CPT | Performed by: NURSE PRACTITIONER

## 2022-01-13 PROCEDURE — 83930 ASSAY OF BLOOD OSMOLALITY: CPT | Performed by: NURSE PRACTITIONER

## 2022-01-13 PROCEDURE — 80048 BASIC METABOLIC PNL TOTAL CA: CPT | Performed by: NURSE PRACTITIONER

## 2022-01-13 PROCEDURE — 85027 COMPLETE CBC AUTOMATED: CPT | Performed by: NURSE PRACTITIONER

## 2022-01-13 PROCEDURE — 84439 ASSAY OF FREE THYROXINE: CPT | Performed by: NURSE PRACTITIONER

## 2022-01-13 PROCEDURE — 84443 ASSAY THYROID STIM HORMONE: CPT | Performed by: NURSE PRACTITIONER

## 2022-01-13 PROCEDURE — 93970 EXTREMITY STUDY: CPT

## 2022-01-13 PROCEDURE — 82550 ASSAY OF CK (CPK): CPT | Performed by: NURSE PRACTITIONER

## 2022-01-13 PROCEDURE — 80053 COMPREHEN METABOLIC PANEL: CPT | Performed by: NURSE PRACTITIONER

## 2022-01-13 PROCEDURE — 93970 EXTREMITY STUDY: CPT | Performed by: SURGERY

## 2022-01-13 PROCEDURE — 93010 ELECTROCARDIOGRAM REPORT: CPT | Performed by: INTERNAL MEDICINE

## 2022-01-13 PROCEDURE — 84295 ASSAY OF SERUM SODIUM: CPT | Performed by: NURSE PRACTITIONER

## 2022-01-13 PROCEDURE — 83735 ASSAY OF MAGNESIUM: CPT | Performed by: NURSE PRACTITIONER

## 2022-01-13 PROCEDURE — 85730 THROMBOPLASTIN TIME PARTIAL: CPT | Performed by: FAMILY MEDICINE

## 2022-01-13 PROCEDURE — 85730 THROMBOPLASTIN TIME PARTIAL: CPT | Performed by: NURSE PRACTITIONER

## 2022-01-13 RX ORDER — LEVALBUTEROL 1.25 MG/.5ML
1.25 SOLUTION, CONCENTRATE RESPIRATORY (INHALATION)
Status: DISCONTINUED | OUTPATIENT
Start: 2022-01-13 | End: 2022-01-17

## 2022-01-13 RX ORDER — SODIUM CHLORIDE 9 MG/ML
50 INJECTION, SOLUTION INTRAVENOUS CONTINUOUS
Status: DISCONTINUED | OUTPATIENT
Start: 2022-01-13 | End: 2022-01-13

## 2022-01-13 RX ORDER — HEPARIN SODIUM 10000 [USP'U]/100ML
3-30 INJECTION, SOLUTION INTRAVENOUS
Status: DISPENSED | OUTPATIENT
Start: 2022-01-13 | End: 2022-01-15

## 2022-01-13 RX ORDER — MAGNESIUM SULFATE 1 G/100ML
1 INJECTION INTRAVENOUS ONCE
Status: COMPLETED | OUTPATIENT
Start: 2022-01-13 | End: 2022-01-13

## 2022-01-13 RX ORDER — HEPARIN SODIUM 1000 [USP'U]/ML
7200 INJECTION, SOLUTION INTRAVENOUS; SUBCUTANEOUS
Status: ACTIVE | OUTPATIENT
Start: 2022-01-13 | End: 2022-01-15

## 2022-01-13 RX ORDER — HEPARIN SODIUM 1000 [USP'U]/ML
7200 INJECTION, SOLUTION INTRAVENOUS; SUBCUTANEOUS ONCE
Status: DISCONTINUED | OUTPATIENT
Start: 2022-01-13 | End: 2022-01-13

## 2022-01-13 RX ORDER — SODIUM CHLORIDE 9 MG/ML
50 INJECTION, SOLUTION INTRAVENOUS CONTINUOUS
Status: DISPENSED | OUTPATIENT
Start: 2022-01-13 | End: 2022-01-14

## 2022-01-13 RX ORDER — HEPARIN SODIUM 1000 [USP'U]/ML
3600 INJECTION, SOLUTION INTRAVENOUS; SUBCUTANEOUS
Status: DISPENSED | OUTPATIENT
Start: 2022-01-13 | End: 2022-01-15

## 2022-01-13 RX ORDER — SODIUM CHLORIDE FOR INHALATION 0.9 %
3 VIAL, NEBULIZER (ML) INHALATION
Status: DISCONTINUED | OUTPATIENT
Start: 2022-01-13 | End: 2022-01-17

## 2022-01-13 RX ADMIN — FAMOTIDINE 20 MG: 20 TABLET ORAL at 17:21

## 2022-01-13 RX ADMIN — LEVALBUTEROL HYDROCHLORIDE 1.25 MG: 1.25 SOLUTION, CONCENTRATE RESPIRATORY (INHALATION) at 15:32

## 2022-01-13 RX ADMIN — HEPARIN SODIUM 4000 UNITS: 1000 INJECTION INTRAVENOUS; SUBCUTANEOUS at 05:40

## 2022-01-13 RX ADMIN — ISODIUM CHLORIDE 3 ML: 0.03 SOLUTION RESPIRATORY (INHALATION) at 20:54

## 2022-01-13 RX ADMIN — HEPARIN SODIUM 2000 UNITS: 1000 INJECTION INTRAVENOUS; SUBCUTANEOUS at 12:30

## 2022-01-13 RX ADMIN — MAGNESIUM SULFATE HEPTAHYDRATE 1 G: 1 INJECTION, SOLUTION INTRAVENOUS at 15:37

## 2022-01-13 RX ADMIN — DOXYCYCLINE 100 MG: 100 CAPSULE ORAL at 08:32

## 2022-01-13 RX ADMIN — GUAIFENESIN 600 MG: 600 TABLET, EXTENDED RELEASE ORAL at 08:32

## 2022-01-13 RX ADMIN — HEPARIN SODIUM 18 UNITS/KG/HR: 10000 INJECTION, SOLUTION INTRAVENOUS at 16:42

## 2022-01-13 RX ADMIN — GUAIFENESIN 600 MG: 600 TABLET, EXTENDED RELEASE ORAL at 22:05

## 2022-01-13 RX ADMIN — HEPARIN SODIUM 3600 UNITS: 1000 INJECTION INTRAVENOUS; SUBCUTANEOUS at 23:15

## 2022-01-13 RX ADMIN — REMDESIVIR 100 MG: 100 INJECTION, POWDER, LYOPHILIZED, FOR SOLUTION INTRAVENOUS at 22:05

## 2022-01-13 RX ADMIN — ISODIUM CHLORIDE 3 ML: 0.03 SOLUTION RESPIRATORY (INHALATION) at 15:32

## 2022-01-13 RX ADMIN — SODIUM CHLORIDE 50 ML/HR: 0.9 INJECTION, SOLUTION INTRAVENOUS at 15:38

## 2022-01-13 RX ADMIN — Medication 250 MG: at 08:32

## 2022-01-13 RX ADMIN — LEVALBUTEROL HYDROCHLORIDE 1.25 MG: 1.25 SOLUTION, CONCENTRATE RESPIRATORY (INHALATION) at 20:54

## 2022-01-13 RX ADMIN — FAMOTIDINE 20 MG: 20 TABLET ORAL at 08:32

## 2022-01-13 RX ADMIN — DEXAMETHASONE SODIUM PHOSPHATE 6 MG: 4 INJECTION, SOLUTION INTRA-ARTICULAR; INTRALESIONAL; INTRAMUSCULAR; INTRAVENOUS; SOFT TISSUE at 08:31

## 2022-01-13 NOTE — ASSESSMENT & PLAN NOTE
POA, as evidenced by SpO2 82-85% on RA at home, confirmed by his PCP  Pulse ox 89% in ED at rest, up to 94% on 2 L in ED  Patient was hospitalized between 1/7-1/8 for COVID-19 pneumonia  Patient was treated with IV steroid and remdesivir, discharged home on Decadron p o  Reports SOB started one day prior to admission  Initially was on 2 L, now on 3 L, satting low 90s  CTA chest on admission showed bilateral COVID pneumonia, no PE  Follow mild pathway  Cardiac markers:  Troponin unremarkable, proBNP mildly elevated, check CK  Inflammatory markers:   7, D-dimer 6 72 initially  CRP trending down, D-dimer trending up  Lower venous Doppler positive for acute DVT in both lower extremities  Continue IV dexamethasone, remdesivir  Received 1 dose of Rocephin doxycycline  Procalcitonin x2 negative  Continue heparin infusion, change ACS protocol to DVT PE protocol  Continue Mucinex  Start Xopenex q i d  Lower venous Doppler positive for DVT, patient placed on heparin drip    Will consider transitioning to oral anticoagulation  Supportive care with IS, self prone  Check CMP, CBC with diff, Mag, CRP, D-dimer in the morning  Supportive care with IS, self prone      Results from last 7 days   Lab Units 01/07/22  1309   SARS-COV-2  Positive*     Results from last 7 days   Lab Units 01/13/22  0439 01/12/22  1617 01/08/22  0527 01/07/22  1309   CRP mg/L 135 3* 165 7* 91 1*  --    D-DIMER QUANTITATIVE ug/ml FEU 7 73* 6 72*  --  3 39*      Results from last 7 days   Lab Units 01/13/22  0439 01/13/22  0026 01/08/22  0527 01/07/22  1309   PROCALCITONIN ng/ml 0 24 0 24 0 30* 0 39*

## 2022-01-13 NOTE — ASSESSMENT & PLAN NOTE
Sodium 127  Likely multifocal secondary to poor oral intake and HCTZ use  Trial of gentle IV hydration  Hold HCTZ  Check urine sodium, urine Osmo, serum osmol, uric acid, TSH, free T4  Check BMP every 6 hours

## 2022-01-13 NOTE — ASSESSMENT & PLAN NOTE
POA, as evidenced by SpO2 82-85% on RA at home, confirmed by his PCP  Pulse ox 89% in ED at rest, up to 94% on 2 L in ED  Patient was hospitalized between 1/7-1/8 for COVID-19 pneumonia  Patient was treated with IV steroid and remdesivir, discharged home on Decadron p o  Reports SOB started one day prior to admission  Initially was on 2 L, now on 3 L, satting low 90s  CTA chest on admission showed bilateral COVID pneumonia, no PE  Follow mild pathway  Cardiac markers:  Troponin unremarkable, proBNP mildly elevated, check CK  Inflammatory markers:   7, D-dimer 6 72 initially  CRP trending down, D-dimer trending up  Continue IV dexamethasone, remdesivir  Received 1 dose of Rocephin doxycycline  Procalcitonin x2 negative  Continue heparin infusion ACS protocol  Continue Mucinex  Start Xopenex q i d    Check lower venous Doppler to rule out DVT, pending  Supportive care with IS, self prone  Check CMP, CBC with diff, Mag, CRP, D-dimer in the morning  Supportive care with IS, self prone      Results from last 7 days   Lab Units 01/07/22  1309   SARS-COV-2  Positive*     Results from last 7 days   Lab Units 01/13/22  0439 01/12/22  1617 01/08/22  0527 01/07/22  1309   CRP mg/L 135 3* 165 7* 91 1*  --    D-DIMER QUANTITATIVE ug/ml FEU 7 73* 6 72*  --  3 39*      Results from last 7 days   Lab Units 01/13/22  0439 01/13/22  0026 01/08/22  0527 01/07/22  1309   PROCALCITONIN ng/ml 0 24 0 24 0 30* 0 39*

## 2022-01-13 NOTE — PLAN OF CARE
Problem: PAIN - ADULT  Goal: Verbalizes/displays adequate comfort level or baseline comfort level  Description: Interventions:  - Encourage patient to monitor pain and request assistance  - Assess pain using appropriate pain scale  - Administer analgesics based on type and severity of pain and evaluate response  - Implement non-pharmacological measures as appropriate and evaluate response  - Consider cultural and social influences on pain and pain management  - Notify physician/advanced practitioner if interventions unsuccessful or patient reports new pain  Outcome: Progressing     Problem: INFECTION - ADULT  Goal: Absence or prevention of progression during hospitalization  Description: INTERVENTIONS:  - Assess and monitor for signs and symptoms of infection  - Monitor lab/diagnostic results  - Monitor all insertion sites, i e  indwelling lines, tubes, and drains  - Monitor endotracheal if appropriate and nasal secretions for changes in amount and color  - [place] appropriate cooling/warming therapies per order  - Administer medications as ordered  - Instruct and encourage patient and family to use good hand hygiene technique  - Identify and instruct in appropriate isolation precautions for identified infection/condition  Outcome: Progressing     Problem: SAFETY ADULT  Goal: Patient will remain free of falls  Description: INTERVENTIONS:  - Educate patient/family on patient safety including physical limitations  - Instruct patient to call for assistance with activity   - Consult OT/PT to assist with strengthening/mobility   - Keep Call bell within reach  - Keep bed low and locked with side rails adjusted as appropriate  - Keep care items and personal belongings within reach  - Initiate and maintain comfort rounds  - Make Fall Risk Sign visible to staff  - Offer Toileting every 3 Hours, in advance of need  - Initiate/Maintain alarm  - Obtain necessary fall risk management equipment:   - Apply yellow socks and bracelet for high fall risk patients  - Consider moving patient to room near nurses station  Outcome: Progressing  Goal: Maintain or return to baseline ADL function  Description: INTERVENTIONS:  -  Assess patient's ability to carry out ADLs; assess patient's baseline for ADL function and identify physical deficits which impact ability to perform ADLs (bathing, care of mouth/teeth, toileting, grooming, dressing, etc )  - Assess/evaluate cause of self-care deficits   - Assess range of motion  - Assess patient's mobility; develop plan if impaired  - Assess patient's need for assistive devices and provide as appropriate  - Encourage maximum independence but intervene and supervise when necessary  - Involve family in performance of ADLs  - Assess for home care needs following discharge   - Consider OT consult to assist with ADL evaluation and planning for discharge  - Provide patient education as appropriate  Outcome: Progressing  Goal: Maintains/Returns to pre admission functional level  Description: INTERVENTIONS:  - Perform BMAT or MOVE assessment daily    - Set and communicate daily mobility goal to care team and patient/family/caregiver  - Collaborate with rehabilitation services on mobility goals if consulted  - Perform Range of Motion 2 times a day  - Reposition patient every 3 hours    - Dangle patient 3 times a day  - Stand patient 2 times a day  - Ambulate patient 3 times a day  - Out of bed to chair 2 times a day   - Out of bed for meals 3 times a day  - Out of bed for toileting  - Record patient progress and toleration of activity level   Outcome: Progressing     Problem: DISCHARGE PLANNING  Goal: Discharge to home or other facility with appropriate resources  Description: INTERVENTIONS:  - Identify barriers to discharge w/patient and caregiver  - Arrange for needed discharge resources and transportation as appropriate  - Identify discharge learning needs (meds, wound care, etc )  - Arrange for interpretive services to assist at discharge as needed  - Refer to Case Management Department for coordinating discharge planning if the patient needs post-hospital services based on physician/advanced practitioner order or complex needs related to functional status, cognitive ability, or social support system  Outcome: Progressing     Problem: Knowledge Deficit  Goal: Patient/family/caregiver demonstrates understanding of disease process, treatment plan, medications, and discharge instructions  Description: Complete learning assessment and assess knowledge base    Interventions:  - Provide teaching at level of understanding  - Provide teaching via preferred learning methods  Outcome: Progressing

## 2022-01-13 NOTE — ASSESSMENT & PLAN NOTE
Bilateral pneumonia on images    Ruled out bacterial pneumonia as procalcitonin negative x2  Treatment as above

## 2022-01-13 NOTE — ASSESSMENT & PLAN NOTE
Patient is on Prinzide at home    Continue lisinopril at lower dose  Hold HCTZ due to hyponatremia  BP stable

## 2022-01-13 NOTE — ASSESSMENT & PLAN NOTE
WBC 15 59  Likely due to steroids  Patient afebrile    Procalcitonin negative x2  Received 1 day of IV Rocephin and doxycycline  WBC trending down

## 2022-01-13 NOTE — ASSESSMENT & PLAN NOTE
POA, as evidenced by SpO2 82-85% on RA at home, confirmed by his PCP  Pulse ox 89% in ED at rest, up to 94% on 2 L in ED  Patient was hospitalized between 1/7-1/8 for COVID-19 pneumonia  Patient was treated with IV steroid and remdesivir, discharged home on Decadron p o  Reports SOB started yesterday  Currently on 2 L, satting low to mid 90s  CTA chest today showed bilateral COVID pneumonia, no PE    Follow mild pathway  Cardiac markers:  Troponin unremarkable, proBNP mildly elevated, check CK  Inflammatory markers:   7, D-dimer 6 72  Start IV dexamethasone, remdesivir  Start Rocephin doxycycline in view of leukocytosis while pending procalcitonin  Start heparin infusion ACS protocol  Start Mucinex  Check lower venous Doppler to rule out DVT  Supportive care with IS, self prone  Check CMP, CBC, CRP, D-dimer in the morning  Check procalcitonin    Results from last 7 days   Lab Units 01/07/22  1309   SARS-COV-2  Positive*     Results from last 7 days   Lab Units 01/12/22  1617 01/08/22  0527 01/07/22  1309   CRP mg/L 165 7* 91 1*  --    D-DIMER QUANTITATIVE ug/ml FEU 6 72*  --  3 39*      Results from last 7 days   Lab Units 01/08/22  0527 01/07/22  1309   PROCALCITONIN ng/ml 0 30* 0 39*

## 2022-01-13 NOTE — PLAN OF CARE
Problem: PAIN - ADULT  Goal: Verbalizes/displays adequate comfort level or baseline comfort level  Description: Interventions:  - Encourage patient to monitor pain and request assistance  - Assess pain using appropriate pain scale  - Administer analgesics based on type and severity of pain and evaluate response  - Implement non-pharmacological measures as appropriate and evaluate response  - Consider cultural and social influences on pain and pain management  - Notify physician/advanced practitioner if interventions unsuccessful or patient reports new pain  Outcome: Progressing     Problem: INFECTION - ADULT  Goal: Absence or prevention of progression during hospitalization  Description: INTERVENTIONS:  - Assess and monitor for signs and symptoms of infection  - Monitor lab/diagnostic results  - Monitor all insertion sites, i e  indwelling lines, tubes, and drains  - Monitor endotracheal if appropriate and nasal secretions for changes in amount and color  - [place] appropriate cooling/warming therapies per order  - Administer medications as ordered  - Instruct and encourage patient and family to use good hand hygiene technique  - Identify and instruct in appropriate isolation precautions for identified infection/condition  Outcome: Progressing     Problem: SAFETY ADULT  Goal: Patient will remain free of falls  Description: INTERVENTIONS:  - Educate patient/family on patient safety including physical limitations  - Instruct patient to call for assistance with activity   - Consult OT/PT to assist with strengthening/mobility   - Keep Call bell within reach  - Keep bed low and locked with side rails adjusted as appropriate  - Keep care items and personal belongings within reach  - Initiate and maintain comfort rounds  - Make Fall Risk Sign visible to staff  - Offer Toileting every 2 Hours, in advance of need  - Initiate/Maintain bed alarm  - Obtain necessary fall risk management equipment: yellow socks  - Apply yellow socks and bracelet for high fall risk patients  - Consider moving patient to room near nurses station  Outcome: Progressing  Goal: Maintain or return to baseline ADL function  Description: INTERVENTIONS:  -  Assess patient's ability to carry out ADLs; assess patient's baseline for ADL function and identify physical deficits which impact ability to perform ADLs (bathing, care of mouth/teeth, toileting, grooming, dressing, etc )  - Assess/evaluate cause of self-care deficits   - Assess range of motion  - Assess patient's mobility; develop plan if impaired  - Assess patient's need for assistive devices and provide as appropriate  - Encourage maximum independence but intervene and supervise when necessary  - Involve family in performance of ADLs  - Assess for home care needs following discharge   - Consider OT consult to assist with ADL evaluation and planning for discharge  - Provide patient education as appropriate  Outcome: Progressing  Goal: Maintains/Returns to pre admission functional level  Description: INTERVENTIONS:  - Perform BMAT or MOVE assessment daily    - Set and communicate daily mobility goal to care team and patient/family/caregiver  - Collaborate with rehabilitation services on mobility goals if consulted  - Perform Range of Motion 4 times a day  - Reposition patient every 2 hours    - Dangle patient 3 times a day  - Stand patient 3 times a day  - Ambulate patient 3 times a day  - Out of bed to chair 3 times a day   - Out of bed for meals 3 times a day  - Out of bed for toileting  - Record patient progress and toleration of activity level   Outcome: Progressing     Problem: DISCHARGE PLANNING  Goal: Discharge to home or other facility with appropriate resources  Description: INTERVENTIONS:  - Identify barriers to discharge w/patient and caregiver  - Arrange for needed discharge resources and transportation as appropriate  - Identify discharge learning needs (meds, wound care, etc )  - Arrange for interpretive services to assist at discharge as needed  - Refer to Case Management Department for coordinating discharge planning if the patient needs post-hospital services based on physician/advanced practitioner order or complex needs related to functional status, cognitive ability, or social support system  Outcome: Progressing     Problem: Knowledge Deficit  Goal: Patient/family/caregiver demonstrates understanding of disease process, treatment plan, medications, and discharge instructions  Description: Complete learning assessment and assess knowledge base    Interventions:  - Provide teaching at level of understanding  - Provide teaching via preferred learning methods  Outcome: Progressing

## 2022-01-13 NOTE — ASSESSMENT & PLAN NOTE
Venous Doppler showed acute DVT in both lower extremities  Converting heparin infusion to DVT PE protocol  No PE on CTA  Will need DOAC on discharge

## 2022-01-13 NOTE — ASSESSMENT & PLAN NOTE
WBC 15 59  Likely due to steroids  Patient afebrile    Check procalcitonin  IV Rocephin and doxycycline while pending procalcitonin  Repeat CBC in the morning

## 2022-01-13 NOTE — ASSESSMENT & PLAN NOTE
Sodium 127 on admission  Likely multifocal secondary to poor oral intake and HCTZ use,? SIADH  Hold HCTZ  Urine sodium 71, urine Osmo 699, serum osmol 280, uric acid 4 5, TSH, free T4 normal  Sodium improved with gentle IV hydration  IV fluids stopped overnight likely due to fast correction of Na level by night provider  Sodium 132->130 this morning  Restart gentle IV hydration     Repeat sodium this evening

## 2022-01-13 NOTE — PROGRESS NOTES
[place] 43  Progress Note - Elizabeth Brian 1955, 74 y o  male MRN: [id number]  Unit/Bed#: [address] [place] [address] Road [facility] Encounter: [id number]  Primary Care Provider: Abe Newsome MD   Date and time admitted to hospital: 1/12/2022  3:34 PM    * Acute respiratory failure with hypoxia [facility])  Assessment & Plan  POA, as evidenced by SpO2 82-85% on RA at home, confirmed by his PCP  Pulse ox 89% in ED at rest, up to 94% on 2 L in ED  Patient was hospitalized between 1/7-1/8 for COVID-19 pneumonia  Patient was treated with IV steroid and remdesivir, discharged home on Decadron p o  Reports SOB started one day prior to admission  Initially was on 2 L, now on 3 L, satting low 90s  CTA chest on admission showed bilateral COVID pneumonia, no PE  Follow mild pathway  Cardiac markers:  Troponin unremarkable, proBNP mildly elevated, check CK  Inflammatory markers:   7, D-dimer 6 72 initially  CRP trending down, D-dimer trending up  Continue IV dexamethasone, remdesivir  Received 1 dose of Rocephin doxycycline  Procalcitonin x2 negative  Continue heparin infusion ACS protocol  Continue Mucinex  Start Xopenex q i d  Check lower venous Doppler to rule out DVT, pending  Supportive care with IS, self prone  Check CMP, CBC with diff, Mag, CRP, D-dimer in the morning  Supportive care with IS, self prone      Results from last 7 days   Lab Units 01/07/22  1309   SARS-COV-2  Positive*     Results from last 7 days   Lab Units 01/13/22  0439 01/12/22  1617 01/08/22  0527 01/07/22  1309   CRP mg/L 135 3* 165 7* 91 1*  --    D-DIMER QUANTITATIVE ug/ml FEU 7 73* 6 72*  --  3 39*      Results from last 7 days   Lab Units 01/13/22  0439 01/13/22  0026 01/08/22  0527 01/07/22  1309   PROCALCITONIN ng/ml 0 24 0 24 0 30* 0 39*        Pneumonia due to COVID-19 virus  Assessment & Plan  Bilateral pneumonia on images    Ruled out bacterial pneumonia as procalcitonin negative x2  Treatment as above    Leukocytosis  Assessment & Plan  WBC 15 59  Likely due to steroids  Patient afebrile  Procalcitonin negative x2  Received 1 day of IV Rocephin and doxycycline  WBC trending down    Hiccups  Assessment & Plan  Reglan prn    Hyponatremia  Assessment & Plan  Sodium 127 on admission  Likely multifocal secondary to poor oral intake and HCTZ use,? SIADH  Hold HCTZ  Urine sodium 71, urine Osmo 699, serum osmol 280, uric acid 4 5, TSH, free T4 normal  Sodium improved with gentle IV hydration  IV fluids stopped overnight likely due to fast correction of Na level by night provider  Sodium 132->130 this morning  Restart gentle IV hydration  Repeat sodium this evening    Hypertension  Assessment & Plan  Patient is on Prinzide at home  Hold lisinopril as BP soft currently  Hold HCTZ due to hyponatremia  Goal -130s  VTE Pharmacologic Prophylaxis:   Pharmacologic: Heparin Drip  Mechanical VTE Prophylaxis in Place: No    Patient Centered Rounds: I have performed bedside rounds with nursing staff today  Discussions with Specialists or Other Care Team Provider:  Yes    Education and Discussions with Family / Patient:  Yes    Time Spent for Care: 20 minutes  More than 50% of total time spent on counseling and coordination of care as described above  Current Length of Stay: 1 day(s)    Current Patient Status: Inpatient   Certification Statement: The patient will continue to require additional inpatient hospital stay due to COVID-19 pneumonia, acute respiratory failure    Discharge Plan:  Home once medically cleared    Code Status: Level 1 - Full Code      Subjective:   Patient reports SOB with exertion, reports dry cough  Denies chest pain, headache, dizziness, nausea vomiting diarrhea, constipation, fever, chills  Reports appetite is okay      Objective:     Vitals:   Temp (24hrs), Av 2 °F (36 8 °C), Min:97 °F (36 1 °C), Max:99 1 °F (37 3 °C)    Temp:  [97 °F (36 1 °C)-99 1 °F (37 3 °C)] 99 1 °F (37 3 °C)  HR:  [] 93  Resp:  [18-24] 19  BP: (113-133)/(66-86) 117/68  SpO2:  [89 %-96 %] 94 %  Body mass index is 25 kg/m²  Input and Output Summary (last 24 hours): Intake/Output Summary (Last 24 hours) at 1/13/2022 1453  Last data filed at 1/13/2022 1001  Gross per 24 hour   Intake 250 ml   Output 1050 ml   Net -800 ml       Physical Exam:     Physical Exam  Vitals and nursing note reviewed  Constitutional:       Appearance: He is well-developed  HENT:      Head: Normocephalic and atraumatic  Neck:      Thyroid: No thyromegaly  Vascular: No JVD  Trachea: No tracheal deviation  Cardiovascular:      Rate and Rhythm: Normal rate and regular rhythm  Pulmonary:      Effort: Pulmonary effort is normal  No respiratory distress  Breath sounds: No wheezing or rales  Comments: Mild tachypneic during conversation, on oxygen 3 L, satting low 90s  Abdominal:      General: Bowel sounds are normal  There is no distension  Palpations: Abdomen is soft  Tenderness: There is no abdominal tenderness  There is no guarding  Musculoskeletal:         General: No swelling or deformity  Cervical back: Neck supple  Right lower leg: No edema  Left lower leg: Edema present  Skin:     General: Skin is warm and dry  Neurological:      General: No focal deficit present  Mental Status: He is alert and oriented to person, place, and time     Psychiatric:         Mood and Affect: Mood normal          Judgment: Judgment normal          Additional Data:     Labs:    Results from last 7 days   Lab Units 01/13/22  0439 01/12/22  1617 01/12/22  1617 01/08/22  0527 01/08/22  0527   WBC Thousand/uL 14 43*   < > 15 59*   < > 5 46   HEMOGLOBIN g/dL 10 6*   < > 11 8*   < > 12 0   HEMATOCRIT % 33 0*   < > 35 5*   < > 36 6   PLATELETS Thousands/uL 421*   < > 466*   < > 381   NEUTROS PCT %  --   --   --   --  76*   LYMPHS PCT %  --   --   --   --  9*   LYMPHO PCT %  -- --  2*  --   --    MONOS PCT %  --   --   --   --  13*   MONO PCT %  --   --  8  --   --    EOS PCT %  --   --  0  --  0    < > = values in this interval not displayed  Results from last 7 days   Lab Units 01/13/22  1135 01/13/22  0439 01/13/22  0439   POTASSIUM mmol/L 4 4   < > 3 7   CHLORIDE mmol/L 97*   < > 98*   CO2 mmol/L 23   < > 25   BUN mg/dL 24   < > 23   CREATININE mg/dL 1 07   < > 0 97   CALCIUM mg/dL 8 1*   < > 8 1*   ALK PHOS U/L  --   --  99   ALT U/L  --   --  58   AST U/L  --   --  34    < > = values in this interval not displayed  Results from last 7 days   Lab Units 01/12/22  1617   INR  1 06       * I Have Reviewed All Lab Data Listed Above  * Additional Pertinent Lab Tests Reviewed: [facility] 64 Admission Reviewed    Imaging:    Imaging Reports Reviewed Today Include:  None  Imaging Personally Reviewed by Myself Includes:  None    Recent Cultures (last 7 days):     Results from last 7 days   Lab Units 01/07/22  1309 01/07/22  1300   BLOOD CULTURE  No Growth After 5 Days  No Growth After 5 Days         Last 24 Hours Medication List:   Current Facility-Administered Medications   Medication Dose Route Frequency Provider Last Rate    acetaminophen  650 mg Oral Q6H PRN Abebaimaine Sarkar, CRNP      albuterol  2 puff Inhalation Q6H PRN Abebaimaine Sarkar, CRNP      dexamethasone  6 mg Intravenous Q24H AbebaiDESTINY MoraNP      famotidine  20 mg Oral BID AbebaiSHAHLA Mora      guaiFENesin  600 mg Oral Q12H [facility] CuiDESTINY MoraNP      heparin (porcine)  3-20 Units/kg/hr (Order-Specific) Intravenous Titrated Cuimaine MultanirikDESTINYNP 17 1 Units/kg/hr (01/13/22 1227)    heparin (porcine)  2,000 Units Intravenous Q1H PRN Cuimaine Multanirik, CRNP      heparin (porcine)  4,000 Units Intravenous Q1H PRN Cuiyin Nerissarik, CRNP      levalbuterol  1 25 mg Nebulization 4x Daily Cuiyin Nerissarik CRNP      And    sodium chloride  3 mL Nebulization 4x Daily SHAHLA Segura      magnesium sulfate  1 g Intravenous Once SHAHLA Robin      metoclopramide  5 mg Intravenous Q6H PRN SHAHLA Segura      remdesivir  100 mg Intravenous Q24H SHAHLA Segura      sodium chloride  50 mL/hr Intravenous Continuous SHAHLA Segura          Today, Patient Was Seen By: SHAHLA Robin    ** Please Note: Dragon 360 Dictation voice to text software may have been used in the creation of this document   **

## 2022-01-13 NOTE — UTILIZATION REVIEW
Initial Clinical Review    Admission: Date/Time/Statement:   Admission Orders (From admission, onward)     Ordered        01/12/22 1748  INPATIENT ADMISSION  Once                      Orders Placed This Encounter   Procedures    INPATIENT ADMISSION     Standing Status:   Standing     Number of Occurrences:   1     Order Specific Question:   Level of Care     Answer:   Med Surg [16]     Order Specific Question:   Estimated length of stay     Answer:   More than 2 Midnights     Order Specific Question:   Certification     Answer:   I certify that inpatient services are medically necessary for this patient for a duration of greater than two midnights  See H&P and MD Progress Notes for additional information about the patient's course of treatment  ED Arrival Information     Expected Arrival Acuity    - 1/12/2022 15:18 Emergent         Means of arrival Escorted by Service Admission type    [facility]         Arrival complaint    COVID-19, low oxygen level        Chief Complaint   Patient presents with    Decreased Oxygen Level     admitted on Jan 7 with covid and low oxygen  got o2 sensor today and noted to be low again  states some shortness of breath over last day or so       Initial Presentation:    74 y o  male with PMH of hypertension, COVID-19 infection who presents with worsening SOB since yesterday  Patient reports he was admitted here and discharged on Saturday  He was doing fine until yesterday when he started with worsening SOB  Reports oxygen level was 82-85% on room air at home, confirmed by his PCP who saw the results on the picture  Patient reports dry cough at home  Denies fever, chills, chest pain, headache, dizziness, nausea vomiting diarrhea  Reports poor appetite at home  Reports fatigue and weakness  Patient unvaccinated  Reports taking dexamethasone at home as prescribed      Acute respiratory failure with hypoxia (HCC)  Assessment & Plan  POA, as evidenced by SpO2 82-85% on RA at home, confirmed by his PCP  Pulse ox 89% in ED at rest, up to 94% on 2 L in ED  Patient was hospitalized between 1/7-1/8 for COVID-19 pneumonia  Patient was treated with IV steroid and remdesivir, discharged home on Decadron p o  Reports SOB started yesterday  Currently on 2 L, satting low to mid 90s  CTA chest today showed bilateral COVID pneumonia, no PE  Follow mild pathway  Cardiac markers:  Troponin unremarkable, proBNP mildly elevated, check CK  Inflammatory markers:   7, D-dimer 6 72  Start IV dexamethasone, remdesivir  Start Rocephin doxycycline in view of leukocytosis while pending procalcitonin  Start heparin infusion ACS protocol  Start Mucinex  Check lower venous Doppler to rule out DVT  Supportive care with IS, self prone  Check CMP, CBC, CRP, D-dimer in the morning  Check procalcitonin   Pneumonia due to COVID-19 virus  Assessment & Plan  Bilateral pneumonia on images  Check procalcitonin to r/o bacterial pneumonia  Treatment as above     Leukocytosis  Assessment & Plan  WBC 15 59  Likely due to steroids  Patient afebrile  Check procalcitonin  IV Rocephin and doxycycline while pending procalcitonin  Repeat CBC in the morning     Hiccups  Assessment & Plan  Reglan prn     Hyponatremia  Assessment & Plan  Sodium 127  Likely multifocal secondary to poor oral intake and HCTZ use  Trial of gentle IV hydration  Hold HCTZ  Check urine sodium, urine Osmo, serum osmol, uric acid, TSH, free T4  Check BMP every 6 hours     Hypertension  Assessment & Plan  Patient is on Prinzide at home    Continue lisinopril at lower dose  Hold HCTZ due to hyponatremia  BP stable  Date:  1/13/22  Day 2:   Continues COVID protocol  Iv decadron, remdesivir , iv rocephin /vibramycin , heparin gtt  Oxygen now 4L nc monitor closely   ED Triage Vitals [01/12/22 1531]   Temperature Pulse Respirations Blood Pressure SpO2   (!) 97 °F (36 1 °C) (!) 112 (!) 24 119/66 (!) 89 %      Temp Source Heart Rate Source Patient Position - Orthostatic VS BP Location FiO2 (%)   Tympanic Monitor Sitting Right arm --      Pain Score       No Pain          Wt Readings from Last 1 Encounters:   01/12/22 90 7 kg (200 lb)     Additional Vital Signs:   Pertinent Labs/Diagnostic Test Results:   Results from last 7 days   Lab Units 01/07/22  1309   SARS-COV-2  Positive*     Results from last 7 days   Lab Units 01/13/22  0439 01/12/22  1617 01/08/22  0527 01/07/22  1309 01/07/22  1309   WBC Thousand/uL 14 43* 15 59* 5 46   < > 7 56   HEMOGLOBIN g/dL 10 6* 11 8* 12 0  --  12 3   HEMATOCRIT % 33 0* 35 5* 36 6  --  37 2   PLATELETS Thousands/uL 421* 466* 381   < > 359   NEUTROS ABS Thousands/µL  --   --  4 18  --  6 09    < > = values in this interval not displayed       Results from last 7 days   Lab Units 01/13/22 0439 01/13/22  0026 01/12/22 1617 01/08/22  0527 01/07/22  1309   SODIUM mmol/L 132* 132* 127* 134* 130*   POTASSIUM mmol/L 3 7 3 8 4 1 3 8 3 7   CHLORIDE mmol/L 98* 96* 94* 98* 93*   CO2 mmol/L 25 25 26 25 26   ANION GAP mmol/L 9 11 7 11 11   BUN mg/dL 23 22 23 27* 31*   CREATININE mg/dL 0 97 1 01 1 14 1 10 1 57*   EGFR ml/min/1 73sq m 79 76 65 68 44   CALCIUM mg/dL 8 1* 7 8* 8 3 8 3 8 3   MAGNESIUM mg/dL  --   --   --   --  2 4     Results from last 7 days   Lab Units 01/13/22  0439 01/12/22  1617 01/08/22  0527 01/07/22  1309   AST U/L 34 46* 67* 102*   ALT U/L 58 73 69 85*   ALK PHOS U/L 99 120* 110 132*   TOTAL PROTEIN g/dL 6 0* 6 8 6 4 7 2   ALBUMIN g/dL 2 1* 2 4* 2 4* 3 0*   TOTAL BILIRUBIN mg/dL 0 39 0 64 0 46 0 71     Results from last 7 days   Lab Units 01/13/22  0439 01/13/22  0026 01/12/22  1617 01/08/22  0527 01/07/22  1309   GLUCOSE RANDOM mg/dL 99 123 129 131 108     Results from last 7 days   Lab Units 01/12/22  2109 01/12/22  1832 01/12/22  1617 01/07/22  1745 01/07/22  1541 01/07/22  1309   HS TNI 0HR ng/L  --   --  9  --   --  24   HS TNI 2HR ng/L  --  9  --   --  27  --    HSTNI D2 ng/L  --  0  --   -- 3  --    HS TNI 4HR ng/L 10  --   --  21  --   --    HSTNI D4 ng/L 1  --   --  -3  --   --      Results from last 7 days   Lab Units 01/13/22  0439 01/12/22  1617 01/07/22  1309   D-DIMER QUANTITATIVE ug/ml FEU 7 73* 6 72* 3 39*     Results from last 7 days   Lab Units 01/13/22  0439 01/12/22  2109 01/12/22  1617 01/07/22  1309 01/07/22  1309   PROTIME seconds  --   --  13 6  --  12 3   INR   --   --  1 06  --  0 93   PTT seconds 39* 28 29   < > 32    < > = values in this interval not displayed  Results from last 7 days   Lab Units 01/13/22  0439   TSH 3RD GENERATON uIU/mL 1 808     Results from last 7 days   Lab Units 01/13/22  0439 01/13/22  0026 01/08/22  0527 01/07/22  1309   PROCALCITONIN ng/ml 0 24 0 24 0 30* 0 39*     Results from last 7 days   Lab Units 01/07/22  1309   LACTIC ACID mmol/L 1 0     Results from last 7 days   Lab Units 01/12/22  1617 01/07/22  1309   NT-PRO BNP pg/mL 243* 115     Results from last 7 days   Lab Units 01/08/22  0527   HEP C AB  Non-reactive     Results from last 7 days   Lab Units 01/13/22  0439 01/12/22  1617 01/08/22  0527   CRP mg/L 135 3* 165 7* 91 1*     Results from last 7 days   Lab Units 01/07/22  1744   CLARITY UA  Clear   COLOR UA  Yellow   SPEC GRAV UA  1 020   PH UA  5 5   GLUCOSE UA mg/dl Negative   KETONES UA mg/dl Trace*   BLOOD UA  Moderate*   PROTEIN UA mg/dl 30 (1+)*   NITRITE UA  Negative   BILIRUBIN UA  Negative   UROBILINOGEN UA E U /dl 0 2   LEUKOCYTES UA  Negative   WBC UA /hpf 0-5   RBC UA /hpf 0-1   BACTERIA UA /hpf Occasional   EPITHELIAL CELLS WET PREP /hpf None Seen     Results from last 7 days   Lab Units 01/07/22  1309   INFLUENZA A PCR  Negative   INFLUENZA B PCR  Negative   RSV PCR  Negative     Results from last 7 days   Lab Units 01/07/22  1309 01/07/22  1300   BLOOD CULTURE  No Growth After 5 Days  No Growth After 5 Days       ED Treatment:   Medication Administration from 01/12/2022 1517 to 01/12/2022 2036       Date/Time Order Dose Route Action Action by Comments     01/12/2022 1702 iohexol (OMNIPAQUE) 350 MG/ML injection (SINGLE-DOSE) 85 mL 85 mL Intravenous Given Andreas Rings      01/12/2022 2030 remdesivir (Veklury) 200 mg in sodium chloride 0 9 % 290 mL IVPB 200 mg Intravenous Given Jabier Thornton RN         Past Medical History:   Diagnosis Date    Hypertension      Present on Admission:   Pneumonia due to COVID-19 virus   Hypertension   Hyponatremia      Admitting Diagnosis: Hypoxia [R09 02]  Pneumonia due to COVID-19 virus [U07 1, J12 82]  Age/Sex: 74 y o  male  Admission Orders:  Scheduled Medications:  cefTRIAXone, 1,000 mg, Intravenous, Q24H  dexamethasone, 6 mg, Intravenous, Q24H  doxycycline hyclate, 100 mg, Oral, Q12H NICKO  famotidine, 20 mg, Oral, BID  guaiFENesin, 600 mg, Oral, Q12H [address]  remdesivir, 100 mg, Intravenous, Q24H  saccharomyces boulardii, 250 mg, Oral, BID      Continuous IV Infusions:  heparin (porcine), 3-20 Units/kg/hr (Order-Specific), Intravenous, Titrated      PRN Meds:  acetaminophen, 650 mg, Oral, Q6H PRN  albuterol, 2 puff, Inhalation, Q6H PRN  heparin (porcine), 2,000 Units, Intravenous, Q1H PRN  heparin (porcine), 4,000 Units, Intravenous, Q1H PRN  metoclopramide, 5 mg, Intravenous, Q6H PRN        None    Network Utilization Review Department  ATTENTION: Please call with any questions or concerns to [phone] and carefully listen to the prompts so that you are directed to the right person  All voicemails are confidential   Sudie Crigler all requests for admission clinical reviews, approved or denied determinations and any other requests to dedicated fax number below belonging to the campus where the patient is receiving treatment   List of dedicated fax numbers for the Facilities:  [address] DENIALS (Administrative/Medical Necessity) [phone]   [address] [place] [address] (Maternity/NICU/Pediatrics) [phone]   5000 [facility] Melodie Torres [facility] [phone]   8049 [facility] [phone]   [address] [address] [place] [address] Prakash Malin [address] [phone]   [address] [phone]

## 2022-01-13 NOTE — ASSESSMENT & PLAN NOTE
Patient is on Prinzide at home  Hold lisinopril as BP soft currently  Hold HCTZ due to hyponatremia  Goal -130s

## 2022-01-14 LAB
ALBUMIN SERPL BCP-MCNC: 1.8 G/DL (ref 3.5–5)
ALP SERPL-CCNC: 108 U/L (ref 46–116)
ALT SERPL W P-5'-P-CCNC: 54 U/L (ref 12–78)
ANION GAP SERPL CALCULATED.3IONS-SCNC: 9 MMOL/L (ref 4–13)
APTT PPP: 118 SECONDS (ref 23–37)
APTT PPP: 58 SECONDS (ref 23–37)
APTT PPP: 65 SECONDS (ref 23–37)
AST SERPL W P-5'-P-CCNC: 37 U/L (ref 5–45)
BILIRUB SERPL-MCNC: 0.43 MG/DL (ref 0.2–1)
BUN SERPL-MCNC: 21 MG/DL (ref 5–25)
CALCIUM ALBUM COR SERPL-MCNC: 9.7 MG/DL (ref 8.3–10.1)
CALCIUM SERPL-MCNC: 7.9 MG/DL (ref 8.3–10.1)
CHLORIDE SERPL-SCNC: 101 MMOL/L (ref 100–108)
CO2 SERPL-SCNC: 19 MMOL/L (ref 21–32)
CREAT SERPL-MCNC: 0.83 MG/DL (ref 0.6–1.3)
CRP SERPL QL: 99.7 MG/L
D DIMER PPP FEU-MCNC: 5.25 UG/ML FEU
ERYTHROCYTE [DISTWIDTH] IN BLOOD BY AUTOMATED COUNT: 14 % (ref 11.6–15.1)
GFR SERPL CREATININE-BSD FRML MDRD: 90 ML/MIN/1.73SQ M
GLUCOSE SERPL-MCNC: 95 MG/DL (ref 65–140)
HCT VFR BLD AUTO: 33.4 % (ref 36.5–49.3)
HGB BLD-MCNC: 10.6 G/DL (ref 12–17)
MAGNESIUM SERPL-MCNC: 2 MG/DL (ref 1.6–2.6)
MCH RBC QN AUTO: 27.9 PG (ref 26.8–34.3)
MCHC RBC AUTO-ENTMCNC: 31.7 G/DL (ref 31.4–37.4)
MCV RBC AUTO: 88 FL (ref 82–98)
PLATELET # BLD AUTO: 455 THOUSANDS/UL (ref 149–390)
PMV BLD AUTO: 9 FL (ref 8.9–12.7)
POTASSIUM SERPL-SCNC: 4.6 MMOL/L (ref 3.5–5.3)
PROT SERPL-MCNC: 6 G/DL (ref 6.4–8.2)
RBC # BLD AUTO: 3.8 MILLION/UL (ref 3.88–5.62)
SODIUM SERPL-SCNC: 129 MMOL/L (ref 136–145)
WBC # BLD AUTO: 13.98 THOUSAND/UL (ref 4.31–10.16)

## 2022-01-14 PROCEDURE — 86140 C-REACTIVE PROTEIN: CPT | Performed by: NURSE PRACTITIONER

## 2022-01-14 PROCEDURE — 85379 FIBRIN DEGRADATION QUANT: CPT | Performed by: NURSE PRACTITIONER

## 2022-01-14 PROCEDURE — 85027 COMPLETE CBC AUTOMATED: CPT | Performed by: NURSE PRACTITIONER

## 2022-01-14 PROCEDURE — 85730 THROMBOPLASTIN TIME PARTIAL: CPT | Performed by: FAMILY MEDICINE

## 2022-01-14 PROCEDURE — 83735 ASSAY OF MAGNESIUM: CPT | Performed by: NURSE PRACTITIONER

## 2022-01-14 PROCEDURE — 94760 N-INVAS EAR/PLS OXIMETRY 1: CPT

## 2022-01-14 PROCEDURE — 94640 AIRWAY INHALATION TREATMENT: CPT

## 2022-01-14 PROCEDURE — 99232 SBSQ HOSP IP/OBS MODERATE 35: CPT | Performed by: NURSE PRACTITIONER

## 2022-01-14 PROCEDURE — 80053 COMPREHEN METABOLIC PANEL: CPT | Performed by: NURSE PRACTITIONER

## 2022-01-14 RX ORDER — SODIUM CHLORIDE 9 MG/ML
50 INJECTION, SOLUTION INTRAVENOUS CONTINUOUS
Status: DISPENSED | OUTPATIENT
Start: 2022-01-14 | End: 2022-01-14

## 2022-01-14 RX ADMIN — GUAIFENESIN 600 MG: 600 TABLET, EXTENDED RELEASE ORAL at 20:38

## 2022-01-14 RX ADMIN — HEPARIN SODIUM 3600 UNITS: 1000 INJECTION INTRAVENOUS; SUBCUTANEOUS at 14:02

## 2022-01-14 RX ADMIN — LEVALBUTEROL HYDROCHLORIDE 1.25 MG: 1.25 SOLUTION, CONCENTRATE RESPIRATORY (INHALATION) at 11:28

## 2022-01-14 RX ADMIN — LEVALBUTEROL HYDROCHLORIDE 1.25 MG: 1.25 SOLUTION, CONCENTRATE RESPIRATORY (INHALATION) at 15:55

## 2022-01-14 RX ADMIN — ISODIUM CHLORIDE 3 ML: 0.03 SOLUTION RESPIRATORY (INHALATION) at 07:36

## 2022-01-14 RX ADMIN — FAMOTIDINE 20 MG: 20 TABLET ORAL at 17:38

## 2022-01-14 RX ADMIN — DEXAMETHASONE SODIUM PHOSPHATE 6 MG: 4 INJECTION, SOLUTION INTRA-ARTICULAR; INTRALESIONAL; INTRAMUSCULAR; INTRAVENOUS; SOFT TISSUE at 08:35

## 2022-01-14 RX ADMIN — GUAIFENESIN 600 MG: 600 TABLET, EXTENDED RELEASE ORAL at 08:35

## 2022-01-14 RX ADMIN — ISODIUM CHLORIDE 3 ML: 0.03 SOLUTION RESPIRATORY (INHALATION) at 11:28

## 2022-01-14 RX ADMIN — HEPARIN SODIUM 19 UNITS/KG/HR: 10000 INJECTION, SOLUTION INTRAVENOUS at 23:51

## 2022-01-14 RX ADMIN — LEVALBUTEROL HYDROCHLORIDE 1.25 MG: 1.25 SOLUTION, CONCENTRATE RESPIRATORY (INHALATION) at 07:36

## 2022-01-14 RX ADMIN — FAMOTIDINE 20 MG: 20 TABLET ORAL at 08:35

## 2022-01-14 RX ADMIN — ISODIUM CHLORIDE 3 ML: 0.03 SOLUTION RESPIRATORY (INHALATION) at 15:54

## 2022-01-14 RX ADMIN — REMDESIVIR 100 MG: 100 INJECTION, POWDER, LYOPHILIZED, FOR SOLUTION INTRAVENOUS at 20:38

## 2022-01-14 RX ADMIN — HEPARIN SODIUM 20 UNITS/KG/HR: 10000 INJECTION, SOLUTION INTRAVENOUS at 08:34

## 2022-01-14 NOTE — UTILIZATION REVIEW
Inpatient Admission Authorization Request   NOTIFICATION OF INPATIENT ADMISSION/INPATIENT AUTHORIZATION REQUEST   SERVICING FACILITY:   [place] [address]  Tax ID: [id number]  NPI: [id number]  Place of Service: Inpatient [facility]  60[address]  Place of Service Code: 24     ATTENDING PROVIDER:  Attending Name and NPI#: Antwan Campbell [[id number]]  Address: [address] [place] [address]  Phone: [phone]     UTILIZATION REVIEW CONTACT:  Sera Fong Utilization   Network Utilization Review Department  Phone: [phone]  Fax [phone]  Email: [email or web address]     PHYSICIAN ADVISORY SERVICES:  FOR [id number] REVIEW - MEDICAL NECESSITY DENIAL  Phone: [phone]  Fax: [phone]  Email: [email or web address]     TYPE OF REQUEST:  Inpatient Status     ADMISSION INFORMATION:  ADMISSION DATE/TIME: 1/12/22  5:48 PM  PATIENT DIAGNOSIS CODE/DESCRIPTION:  Hypoxia [R09 02]  Pneumonia due to COVID-19 virus [U07 1, J12 82]  DISCHARGE DATE/TIME: No discharge date for patient encounter  DISCHARGE DISPOSITION (IF DISCHARGED): Home/Self Care     IMPORTANT INFORMATION:  Please contact the Renetta Reagan directly with any questions or concerns regarding this request  Department voicemails are confidential     Send requests for admission clinical reviews, concurrent reviews, approvals, and administrative denials due to lack of clinical to fax [phone]

## 2022-01-14 NOTE — PROGRESS NOTES
Pastoral Care Progress Note    2022  Patient: Ezra Fuentes : 1955  Admission Date & Time: 2022 1534  MRN: [id number] [facility]: [id number]                     Chaplaincy Interventions Utilized:   Relationship Building: Cultivated a relationship of care and support  Patient was sleeping, so I left him a note and my  contact information in case he would like a pastoral visit        22 1300   Clinical Encounter Type   Visited With Patient   Routine Visit Introduction   Referral From Family  (His wife is also a patient; asked me to visit him)   Referral To

## 2022-01-14 NOTE — CASE MANAGEMENT
Case Management Assessment & Discharge Planning Note    Patient name Anish Vega  Location [address] [address] MRN [id number]  : 1955 Date 2022       Current Admission Date: 2022  Current Admission Diagnosis:Acute respiratory failure with hypoxia [facility])   Patient Active Problem List    Diagnosis Date Noted    Acute deep vein thrombosis (DVT) of both lower extremities ([facility] [address] ) 2022    Hiccups 2022    Leukocytosis 2022    Acute respiratory failure with hypoxia ([facility] [address] ) 2022    Pneumonia due to COVID-19 virus 2022    Hypertension 2022    Hyponatremia 2022      LOS (days): 2  Geometric Mean LOS (GMLOS) (days): 5 40  Days to GMLOS:3 5     OBJECTIVE:  PATIENT READMITTED TO HOSPITAL  Risk of Unplanned Readmission Score: 15      Current admission status: Inpatient    Preferred Pharmacy:   [address] [place] [id number]  Phone: [phone] Fax: [phone]    Primary Care Provider: Charley Huntley MD    Primary Insurance: [employer]  Secondary Insurance: MEDICARE    ASSESSMENT:  Active Health Care Agents    There are no active Health Care Agents on file      Readmission [facility]  30 Day Readmission: No,Yes  Who directed you to return to the hospital?: Self  Did you understand whom to contact if you had questions or problems?: Yes  Did you get your prescriptions before you left the hospital?: No  Reason[de-identified] Not preferred pharmacy  Were you able to get your prescriptions filled when you left the hospital?: Yes  Did you take your medications as prescribed?: Yes  Were you able to get to your follow-up appointments?: No  Reason[de-identified] Readmitted prior to appointment  Patient was readmitted due to: COVID 19    Patient Information  Admitted from[de-identified] Home  Mental Status: Alert  During Assessment patient was accompanied by: Not accompanied during assessment  Assessment information provided by[de-identified] Patient  Primary Caregiver: Self  Support Systems: Self,Spouse/significant other,Daughter  [place] of Residence: [address] [place] [address] do you live in?: [place], [place]  Type of Current Residence: [facility] [employer]  Living Arrangements: Lives w/ [address] w/ Spouse/significant other    Activities of Daily Living Prior to Admission  Functional Status: Independent  Completes ADLs independently?: Yes  Ambulates independently?: Yes  Does patient use assisted devices?: No  Does patient currently own DME?: No  Does patient have a history of Outpatient Therapy (PT/OT)?: No  Does the patient have a history of Short-Term Rehab?: No  Does patient have a history of HHC?: No     Patient Information Continued  Income Source: Employed ()  Does patient have prescription coverage?: Yes  Does patient receive dialysis treatments?: No  Does patient have a history of substance abuse?: No  Does patient have a history of Mental Health Diagnosis?: No     Means of Transportation  Means of Transport to Appts[de-identified] Drives Self    DISCHARGE DETAILS:    Discharge planning discussed with[de-identified] Patient     CM contacted family/caregiver?: Yes  Were Treatment Team discharge recommendations reviewed with patient/caregiver?: Yes  Did patient/caregiver verbalize understanding of patient care needs?: Yes  Were patient/caregiver advised of the risks associated with not following Treatment Team discharge recommendations?: Yes    Contacts  Patient Contacts: Abilio Hope  Relationship to Patient[de-identified] Family  Contact Method: Phone  Phone Number: [phone]  Reason/Outcome: Continuity of [address]         Is the patient interested in [address] at discharge?: No    DME Referral Provided  Referral made for DME?: No     Would you like to participate in our [address] service program?  : No - Declined    Treatment Team Recommendation: Home  Discharge Destination Plan[de-identified] Home  Transport at Discharge : Family    SW contacted pt to introduce role, complete assessment, and discuss discharge planning needs  Patient is a 30 day readmission and came back to [facility] due to worsening COVID 19 symptoms  Pt is completely independent at baseline and continues to work FT as a   Pt states he will need a note for work on discharge with the dates of hospitalization and return to work date  Patient does not anticipate any other needs at this time  SW will continue to follow for discharge planning needs

## 2022-01-14 NOTE — PLAN OF CARE
Problem: PAIN - ADULT  Goal: Verbalizes/displays adequate comfort level or baseline comfort level  Description: Interventions:  - Encourage patient to monitor pain and request assistance  - Assess pain using appropriate pain scale  - Administer analgesics based on type and severity of pain and evaluate response  - Implement non-pharmacological measures as appropriate and evaluate response  - Consider cultural and social influences on pain and pain management  - Notify physician/advanced practitioner if interventions unsuccessful or patient reports new pain  Outcome: Progressing     Problem: INFECTION - ADULT  Goal: Absence or prevention of progression during hospitalization  Description: INTERVENTIONS:  - Assess and monitor for signs and symptoms of infection  - Monitor lab/diagnostic results  - Monitor all insertion sites, i e  indwelling lines, tubes, and drains  - Monitor endotracheal if appropriate and nasal secretions for changes in amount and color  - [place] appropriate cooling/warming therapies per order  - Administer medications as ordered  - Instruct and encourage patient and family to use good hand hygiene technique  - Identify and instruct in appropriate isolation precautions for identified infection/condition  Outcome: Progressing     Problem: SAFETY ADULT  Goal: Patient will remain free of falls  Description: INTERVENTIONS:  - Educate patient/family on patient safety including physical limitations  - Instruct patient to call for assistance with activity   - Consult OT/PT to assist with strengthening/mobility   - Keep Call bell within reach  - Keep bed low and locked with side rails adjusted as appropriate  - Keep care items and personal belongings within reach  - Initiate and maintain comfort rounds  - Make Fall Risk Sign visible to staff  - Offer Toileting every 3 Hours, in advance of need  - Initiate/Maintain alarm  - Obtain necessary fall risk management equipment:   - Apply yellow socks and bracelet for high fall risk patients  - Consider moving patient to room near nurses station  Outcome: Progressing  Goal: Maintain or return to baseline ADL function  Description: INTERVENTIONS:  -  Assess patient's ability to carry out ADLs; assess patient's baseline for ADL function and identify physical deficits which impact ability to perform ADLs (bathing, care of mouth/teeth, toileting, grooming, dressing, etc )  - Assess/evaluate cause of self-care deficits   - Assess range of motion  - Assess patient's mobility; develop plan if impaired  - Assess patient's need for assistive devices and provide as appropriate  - Encourage maximum independence but intervene and supervise when necessary  - Involve family in performance of ADLs  - Assess for home care needs following discharge   - Consider OT consult to assist with ADL evaluation and planning for discharge  - Provide patient education as appropriate  Outcome: Progressing  Goal: Maintains/Returns to pre admission functional level  Description: INTERVENTIONS:  - Perform BMAT or MOVE assessment daily    - Set and communicate daily mobility goal to care team and patient/family/caregiver  - Collaborate with rehabilitation services on mobility goals if consulted  - Perform Range of Motion 2 times a day  - Reposition patient every 3 hours    - Dangle patient 2 times a day  - Stand patient 3 times a day  - Ambulate patient 2 times a day  - Out of bed to chair 3 times a day   - Out of bed for meals 3 times a day  - Out of bed for toileting  - Record patient progress and toleration of activity level   Outcome: Progressing     Problem: DISCHARGE PLANNING  Goal: Discharge to home or other facility with appropriate resources  Description: INTERVENTIONS:  - Identify barriers to discharge w/patient and caregiver  - Arrange for needed discharge resources and transportation as appropriate  - Identify discharge learning needs (meds, wound care, etc )  - Arrange for interpretive services to assist at discharge as needed  - Refer to Case Management Department for coordinating discharge planning if the patient needs post-hospital services based on physician/advanced practitioner order or complex needs related to functional status, cognitive ability, or social support system  Outcome: Progressing     Problem: Knowledge Deficit  Goal: Patient/family/caregiver demonstrates understanding of disease process, treatment plan, medications, and discharge instructions  Description: Complete learning assessment and assess knowledge base    Interventions:  - Provide teaching at level of understanding  - Provide teaching via preferred learning methods  Outcome: Progressing     Problem: RESPIRATORY - ADULT  Goal: Achieves optimal ventilation and oxygenation  Description: INTERVENTIONS:  - Assess for changes in respiratory status  - Assess for changes in mentation and behavior  - Position to facilitate oxygenation and minimize respiratory effort  - Oxygen administered by appropriate delivery if ordered  - Initiate smoking cessation education as indicated  - Encourage broncho-pulmonary hygiene including cough, deep breathe, Incentive Spirometry  - Assess the need for suctioning and aspirate as needed  - Assess and instruct to report SOB or any respiratory difficulty  - Respiratory Therapy support as indicated  Outcome: Progressing

## 2022-01-14 NOTE — PLAN OF CARE
Problem: PAIN - ADULT  Goal: Verbalizes/displays adequate comfort level or baseline comfort level  Description: Interventions:  - Encourage patient to monitor pain and request assistance  - Assess pain using appropriate pain scale  - Administer analgesics based on type and severity of pain and evaluate response  - Implement non-pharmacological measures as appropriate and evaluate response  - Consider cultural and social influences on pain and pain management  - Notify physician/advanced practitioner if interventions unsuccessful or patient reports new pain  Outcome: Progressing     Problem: INFECTION - ADULT  Goal: Absence or prevention of progression during hospitalization  Description: INTERVENTIONS:  - Assess and monitor for signs and symptoms of infection  - Monitor lab/diagnostic results  - Monitor all insertion sites, i e  indwelling lines, tubes, and drains  - Monitor endotracheal if appropriate and nasal secretions for changes in amount and color  - [place] appropriate cooling/warming therapies per order  - Administer medications as ordered  - Instruct and encourage patient and family to use good hand hygiene technique  - Identify and instruct in appropriate isolation precautions for identified infection/condition  Outcome: Progressing     Problem: SAFETY ADULT  Goal: Patient will remain free of falls  Description: INTERVENTIONS:  - Educate patient/family on patient safety including physical limitations  - Instruct patient to call for assistance with activity   - Consult OT/PT to assist with strengthening/mobility   - Keep Call bell within reach  - Keep bed low and locked with side rails adjusted as appropriate  - Keep care items and personal belongings within reach  - Initiate and maintain comfort rounds  - Make Fall Risk Sign visible to staff  - Offer Toileting every 2 Hours, in advance of need  - Initiate/Maintain bed alarm  - Obtain necessary fall risk management equipment: yellow socks  - Apply yellow socks and bracelet for high fall risk patients  - Consider moving patient to room near nurses station  Outcome: Progressing  Goal: Maintain or return to baseline ADL function  Description: INTERVENTIONS:  -  Assess patient's ability to carry out ADLs; assess patient's baseline for ADL function and identify physical deficits which impact ability to perform ADLs (bathing, care of mouth/teeth, toileting, grooming, dressing, etc )  - Assess/evaluate cause of self-care deficits   - Assess range of motion  - Assess patient's mobility; develop plan if impaired  - Assess patient's need for assistive devices and provide as appropriate  - Encourage maximum independence but intervene and supervise when necessary  - Involve family in performance of ADLs  - Assess for home care needs following discharge   - Consider OT consult to assist with ADL evaluation and planning for discharge  - Provide patient education as appropriate  Outcome: Progressing  Goal: Maintains/Returns to pre admission functional level  Description: INTERVENTIONS:  - Perform BMAT or MOVE assessment daily    - Set and communicate daily mobility goal to care team and patient/family/caregiver  - Collaborate with rehabilitation services on mobility goals if consulted  - Perform Range of Motion 4 times a day  - Reposition patient every 2 hours    - Dangle patient 3 times a day  - Stand patient 3 times a day  - Ambulate patient 3 times a day  - Out of bed to chair 3 times a day   - Out of bed for meals 3 times a day  - Out of bed for toileting  - Record patient progress and toleration of activity level   Outcome: Progressing     Problem: DISCHARGE PLANNING  Goal: Discharge to home or other facility with appropriate resources  Description: INTERVENTIONS:  - Identify barriers to discharge w/patient and caregiver  - Arrange for needed discharge resources and transportation as appropriate  - Identify discharge learning needs (meds, wound care, etc )  - Arrange for interpretive services to assist at discharge as needed  - Refer to Case Management Department for coordinating discharge planning if the patient needs post-hospital services based on physician/advanced practitioner order or complex needs related to functional status, cognitive ability, or social support system  Outcome: Progressing     Problem: Knowledge Deficit  Goal: Patient/family/caregiver demonstrates understanding of disease process, treatment plan, medications, and discharge instructions  Description: Complete learning assessment and assess knowledge base    Interventions:  - Provide teaching at level of understanding  - Provide teaching via preferred learning methods  Outcome: Progressing     Problem: RESPIRATORY - ADULT  Goal: Achieves optimal ventilation and oxygenation  Description: INTERVENTIONS:  - Assess for changes in respiratory status  - Assess for changes in mentation and behavior  - Position to facilitate oxygenation and minimize respiratory effort  - Oxygen administered by appropriate delivery if ordered  - Initiate smoking cessation education as indicated  - Encourage broncho-pulmonary hygiene including cough, deep breathe, Incentive Spirometry  - Assess the need for suctioning and aspirate as needed  - Assess and instruct to report SOB or any respiratory difficulty  - Respiratory Therapy support as indicated  Outcome: Progressing

## 2022-01-14 NOTE — ASSESSMENT & PLAN NOTE
Sodium 127 on admission  Likely multifocal secondary to poor oral intake and HCTZ use,? SIADH  Hold HCTZ  Urine sodium 71, urine Osmo 699, serum osmol 280, uric acid 4 5, TSH, free T4 normal  Sodium 132->130 -> 129 this morning  Restart gentle IV hydration     Repeat sodium this evening

## 2022-01-14 NOTE — PROGRESS NOTES
Yomaira U  64   Progress Note - Alanna Henriquez 1955, 74 y o  male MRN: [id number]  Unit/Bed#: [address] [place] [address] Road [facility] Encounter: [id number]  Primary Care Provider: Amol Billingsley MD   Date and time admitted to hospital: 1/12/2022  3:34 PM    * Acute respiratory failure with hypoxia [facility])  Assessment & Plan  POA, as evidenced by SpO2 82-85% on RA at home, confirmed by his PCP  Pulse ox 89% in ED at rest, up to 94% on 2 L in ED  Patient was hospitalized between 1/7-1/8 for COVID-19 pneumonia  Patient was treated with IV steroid and remdesivir, discharged home on Decadron p o  Reports SOB started one day prior to admission  Initially was on 2 L, now on 3 L, satting low 90s  CTA chest on admission showed bilateral COVID pneumonia, no PE  Follow mild pathway  Cardiac markers:  Troponin unremarkable, proBNP mildly elevated, check CK  Inflammatory markers:   7, D-dimer 6 72 initially  CRP trending down, D-dimer trending up  Lower venous Doppler positive for acute DVT in both lower extremities  Continue IV dexamethasone, remdesivir  Received 1 dose of Rocephin doxycycline  Procalcitonin x2 negative  Continue heparin infusion, change ACS protocol to DVT PE protocol  Continue Mucinex  Start Xopenex q i d  Lower venous Doppler positive for DVT, patient placed on heparin drip    Will consider transitioning to oral anticoagulation  Supportive care with IS, self prone  Check CMP, CBC with diff, Mag, CRP, D-dimer in the morning  Supportive care with IS, self prone      Results from last 7 days   Lab Units 01/07/22  1309   SARS-COV-2  Positive*     Results from last 7 days   Lab Units 01/13/22  0439 01/12/22  1617 01/08/22  0527 01/07/22  1309   CRP mg/L 135 3* 165 7* 91 1*  --    D-DIMER QUANTITATIVE ug/ml FEU 7 73* 6 72*  --  3 39*      Results from last 7 days   Lab Units 01/13/22  0439 01/13/22  0026 01/08/22  0527 01/07/22  1309   PROCALCITONIN ng/ml 0 24 0 24 0 30* 0 39* Pneumonia due to COVID-19 virus  Assessment & Plan  Bilateral pneumonia on images  Ruled out bacterial pneumonia as procalcitonin negative x2  Treatment as above    Hyponatremia  Assessment & Plan  Sodium 127 on admission  Likely multifocal secondary to poor oral intake and HCTZ use,? SIADH  Hold HCTZ  Urine sodium 71, urine Osmo 699, serum osmol 280, uric acid 4 5, TSH, free T4 normal  Sodium 132->130 -> 129 this morning  Restart gentle IV hydration  Repeat sodium this evening    Leukocytosis  Assessment & Plan  WBC 15 59  Likely due to steroids  Patient afebrile  Procalcitonin negative x2  Received 1 day of IV Rocephin and doxycycline  WBC trending down    Acute deep vein thrombosis (DVT) of both lower extremities (HCC)  Assessment & Plan  Venous Doppler showed acute DVT in both lower extremities  Converting heparin infusion to DVT PE protocol  No PE on CTA  Will need DOAC on discharge  Hypertension  Assessment & Plan  Patient is on Prinzide at home  Hold lisinopril as BP soft currently  Hold HCTZ due to hyponatremia  Goal -130s  Hiccups  Assessment & Plan  Reglan prn          VTE Pharmacologic Prophylaxis:   High Risk (Score >/= 5) - Pharmacological DVT Prophylaxis Ordered: heparin drip  Sequential Compression Devices Ordered  Patient Centered Rounds: I performed bedside rounds with nursing staff today  Discussions with Specialists or Other Care Team Provider:  Case management    Education and Discussions with Family / Patient: Updated  (daughter) via phone  Time Spent for Care: 30 minutes  More than 50% of total time spent on counseling and coordination of care as described above      Current Length of Stay: 2 day(s)  Current Patient Status: Inpatient   Certification Statement: The patient will continue to require additional inpatient hospital stay due to Heparin drip, IV remdesivir, Decadron, monitoring labs, gentle IV hydration for hyponatremia  Discharge Plan: Anticipate discharge in 48-72 hrs to home  Code Status: Level 1 - Full Code    Subjective:   Patient seen standing at side of bed using urinal   Gait steady  Appears flushed, denies any headache, dizziness, chest pain, abdominal pain, nausea, vomiting or diarrhea  Objective:     Vitals:   Temp (24hrs), Av 4 °F (36 9 °C), Min:97 3 °F (36 3 °C), Max:99 8 °F (37 7 °C)    Temp:  [97 3 °F (36 3 °C)-99 8 °F (37 7 °C)] 97 8 °F (36 6 °C)  HR:  [79-91] 79  Resp:  [18-25] 18  BP: (115-129)/(65-76) 115/70  SpO2:  [90 %-97 %] 97 %  Body mass index is 25 kg/m²  Input and Output Summary (last 24 hours): Intake/Output Summary (Last 24 hours) at 2022 1248  Last data filed at 2022 0545  Gross per 24 hour   Intake --   Output 1700 ml   Net -1700 ml       Physical Exam:   Physical Exam  Vitals and nursing note reviewed  Constitutional:       General: He is not in acute distress  Appearance: Normal appearance  He is ill-appearing  HENT:      Head: Normocephalic  Nose: Nose normal       Mouth/Throat:      Mouth: Mucous membranes are dry  Eyes:      Extraocular Movements: Extraocular movements intact  Conjunctiva/sclera: Conjunctivae normal       Pupils: Pupils are equal, round, and reactive to light  Cardiovascular:      Rate and Rhythm: Normal rate and regular rhythm  Pulses: Normal pulses  Heart sounds: Normal heart sounds  Pulmonary:      Comments: Diminished, coarse rhonchi  Abdominal:      General: Bowel sounds are normal  There is no distension  Palpations: Abdomen is soft  Tenderness: There is no abdominal tenderness  Genitourinary:     Comments: Voiding spontaneously  Musculoskeletal:         General: Normal range of motion  Cervical back: Normal range of motion  Right lower leg: No edema  Left lower leg: No edema  Skin:     Capillary Refill: Capillary refill takes less than 2 seconds        Comments: Flushed appearance   Neurological: General: No focal deficit present  Mental Status: He is oriented to person, place, and time  Psychiatric:         Mood and Affect: Mood normal          Behavior: Behavior normal          Thought Content: Thought content normal          Judgment: Judgment normal          Additional Data:     Labs:  Results from last 7 days   Lab Units 01/14/22  0515 01/13/22  0439 01/12/22  1617 01/08/22  0527 01/08/22  0527   WBC Thousand/uL 13 98*   < > 15 59*   < > 5 46   HEMOGLOBIN g/dL 10 6*   < > 11 8*   < > 12 0   HEMATOCRIT % 33 4*   < > 35 5*   < > 36 6   PLATELETS Thousands/uL 455*   < > 466*   < > 381   NEUTROS PCT %  --   --   --   --  76*   LYMPHS PCT %  --   --   --   --  9*   LYMPHO PCT %  --   --  2*  --   --    MONOS PCT %  --   --   --   --  13*   MONO PCT %  --   --  8  --   --    EOS PCT %  --   --  0  --  0    < > = values in this interval not displayed       Results from last 7 days   Lab Units 01/14/22  0515   SODIUM mmol/L 129*   POTASSIUM mmol/L 4 6   CHLORIDE mmol/L 101   CO2 mmol/L 19*   BUN mg/dL 21   CREATININE mg/dL 0 83   ANION GAP mmol/L 9   CALCIUM mg/dL 7 9*   ALBUMIN g/dL 1 8*   TOTAL BILIRUBIN mg/dL 0 43   ALK PHOS U/L 108   ALT U/L 54   AST U/L 37   GLUCOSE RANDOM mg/dL 95     Results from last 7 days   Lab Units 01/12/22  1617   INR  1 06             Results from last 7 days   Lab Units 01/13/22  0439 01/13/22  0026 01/08/22  0527 01/07/22  1309   LACTIC ACID mmol/L  --   --   --  1 0   PROCALCITONIN ng/ml 0 24 0 24 0 30* 0 39*       Lines/Drains:  Invasive Devices  Report    Peripheral Intravenous Line            Peripheral IV 01/12/22 Left Antecubital 1 day    Peripheral IV 01/13/22 Distal;Right;Ventral (anterior) Forearm <1 day                      Imaging: Reviewed radiology reports from this admission including: Vascular study bilateral lower extremities    Recent Cultures (last 7 days):   Results from last 7 days   Lab Units 01/07/22  1309 01/07/22  1300   BLOOD CULTURE  No Growth After 5 Days  No Growth After 5 Days  Last 24 Hours Medication List:   Current Facility-Administered Medications   Medication Dose Route Frequency Provider Last Rate    acetaminophen  650 mg Oral Q6H PRN Cuiyin Yurik, CRNP      albuterol  2 puff Inhalation Q6H PRN Cuiyin Yurik, CRNP      dexamethasone  6 mg Intravenous Q24H Cuiyin Yurik, CRNP      famotidine  20 mg Oral BID Cuiyin Yurik, CRNP      guaiFENesin  600 mg Oral Q12H [address] Cuiyin Nerissarik, CRNP      heparin (porcine)  3-30 Units/kg/hr (Order-Specific) Intravenous Titrated Cuiyin Nerissarik, CRNP 20 Units/kg/hr (01/14/22 3934)    heparin (porcine)  3,600 Units Intravenous Q1H PRN Cuiyin Yurik, CRNP      heparin (porcine)  7,200 Units Intravenous Q1H PRN Cuimaine Multanirik, CRNP      levalbuterol  1 25 mg Nebulization 4x Daily Abebaimaine Multanirik, CRNP      And    sodium chloride  3 mL Nebulization 4x Daily Cuimaine Multanirik, CRNP      metoclopramide  5 mg Intravenous Q6H PRN Cuimaine Yurik, DESTINYNP      remdesivir  100 mg Intravenous Q24H Cuiyin Yurik, CRNP      sodium chloride  50 mL/hr Intravenous Continuous SHAHLA Suarez 50 mL/hr (01/14/22 9480)        Today, Patient Was Seen By: SHAHLA Suarez    **Please Note: This note may have been constructed using a voice recognition system  **

## 2022-01-15 LAB
ALBUMIN SERPL BCP-MCNC: 2 G/DL (ref 3.5–5)
ALP SERPL-CCNC: 101 U/L (ref 46–116)
ALT SERPL W P-5'-P-CCNC: 48 U/L (ref 12–78)
ANION GAP SERPL CALCULATED.3IONS-SCNC: 10 MMOL/L (ref 4–13)
APTT PPP: 50 SECONDS (ref 23–37)
APTT PPP: 90 SECONDS (ref 23–37)
AST SERPL W P-5'-P-CCNC: 22 U/L (ref 5–45)
BILIRUB SERPL-MCNC: 0.49 MG/DL (ref 0.2–1)
BUN SERPL-MCNC: 17 MG/DL (ref 5–25)
CALCIUM ALBUM COR SERPL-MCNC: 9.6 MG/DL (ref 8.3–10.1)
CALCIUM SERPL-MCNC: 8 MG/DL (ref 8.3–10.1)
CHLORIDE SERPL-SCNC: 101 MMOL/L (ref 100–108)
CO2 SERPL-SCNC: 22 MMOL/L (ref 21–32)
CREAT SERPL-MCNC: 0.96 MG/DL (ref 0.6–1.3)
CRP SERPL QL: 59.8 MG/L
D DIMER PPP FEU-MCNC: 3.86 UG/ML FEU
ERYTHROCYTE [DISTWIDTH] IN BLOOD BY AUTOMATED COUNT: 13.9 % (ref 11.6–15.1)
GFR SERPL CREATININE-BSD FRML MDRD: 80 ML/MIN/1.73SQ M
GLUCOSE SERPL-MCNC: 106 MG/DL (ref 65–140)
HCT VFR BLD AUTO: 34.3 % (ref 36.5–49.3)
HGB BLD-MCNC: 11.3 G/DL (ref 12–17)
MCH RBC QN AUTO: 28.5 PG (ref 26.8–34.3)
MCHC RBC AUTO-ENTMCNC: 32.9 G/DL (ref 31.4–37.4)
MCV RBC AUTO: 86 FL (ref 82–98)
PLATELET # BLD AUTO: 486 THOUSANDS/UL (ref 149–390)
PMV BLD AUTO: 8.7 FL (ref 8.9–12.7)
POTASSIUM SERPL-SCNC: 4 MMOL/L (ref 3.5–5.3)
PROT SERPL-MCNC: 6 G/DL (ref 6.4–8.2)
RBC # BLD AUTO: 3.97 MILLION/UL (ref 3.88–5.62)
SODIUM SERPL-SCNC: 133 MMOL/L (ref 136–145)
WBC # BLD AUTO: 13.65 THOUSAND/UL (ref 4.31–10.16)

## 2022-01-15 PROCEDURE — 85379 FIBRIN DEGRADATION QUANT: CPT | Performed by: NURSE PRACTITIONER

## 2022-01-15 PROCEDURE — 85027 COMPLETE CBC AUTOMATED: CPT | Performed by: NURSE PRACTITIONER

## 2022-01-15 PROCEDURE — 94760 N-INVAS EAR/PLS OXIMETRY 1: CPT

## 2022-01-15 PROCEDURE — 99232 SBSQ HOSP IP/OBS MODERATE 35: CPT | Performed by: NURSE PRACTITIONER

## 2022-01-15 PROCEDURE — 86140 C-REACTIVE PROTEIN: CPT | Performed by: NURSE PRACTITIONER

## 2022-01-15 PROCEDURE — 85730 THROMBOPLASTIN TIME PARTIAL: CPT | Performed by: FAMILY MEDICINE

## 2022-01-15 PROCEDURE — 94640 AIRWAY INHALATION TREATMENT: CPT

## 2022-01-15 PROCEDURE — 80053 COMPREHEN METABOLIC PANEL: CPT | Performed by: NURSE PRACTITIONER

## 2022-01-15 RX ADMIN — GUAIFENESIN 600 MG: 600 TABLET, EXTENDED RELEASE ORAL at 09:00

## 2022-01-15 RX ADMIN — ISODIUM CHLORIDE 3 ML: 0.03 SOLUTION RESPIRATORY (INHALATION) at 11:18

## 2022-01-15 RX ADMIN — ISODIUM CHLORIDE 3 ML: 0.03 SOLUTION RESPIRATORY (INHALATION) at 15:39

## 2022-01-15 RX ADMIN — HEPARIN SODIUM 3600 UNITS: 1000 INJECTION INTRAVENOUS; SUBCUTANEOUS at 05:54

## 2022-01-15 RX ADMIN — GUAIFENESIN 600 MG: 600 TABLET, EXTENDED RELEASE ORAL at 21:35

## 2022-01-15 RX ADMIN — REMDESIVIR 100 MG: 100 INJECTION, POWDER, LYOPHILIZED, FOR SOLUTION INTRAVENOUS at 21:35

## 2022-01-15 RX ADMIN — APIXABAN 10 MG: 5 TABLET, FILM COATED ORAL at 17:16

## 2022-01-15 RX ADMIN — FAMOTIDINE 20 MG: 20 TABLET ORAL at 17:16

## 2022-01-15 RX ADMIN — LEVALBUTEROL HYDROCHLORIDE 1.25 MG: 1.25 SOLUTION, CONCENTRATE RESPIRATORY (INHALATION) at 21:08

## 2022-01-15 RX ADMIN — ISODIUM CHLORIDE 3 ML: 0.03 SOLUTION RESPIRATORY (INHALATION) at 21:08

## 2022-01-15 RX ADMIN — LEVALBUTEROL HYDROCHLORIDE 1.25 MG: 1.25 SOLUTION, CONCENTRATE RESPIRATORY (INHALATION) at 07:52

## 2022-01-15 RX ADMIN — FAMOTIDINE 20 MG: 20 TABLET ORAL at 09:00

## 2022-01-15 RX ADMIN — LEVALBUTEROL HYDROCHLORIDE 1.25 MG: 1.25 SOLUTION, CONCENTRATE RESPIRATORY (INHALATION) at 11:18

## 2022-01-15 RX ADMIN — DEXAMETHASONE SODIUM PHOSPHATE 6 MG: 4 INJECTION, SOLUTION INTRA-ARTICULAR; INTRALESIONAL; INTRAMUSCULAR; INTRAVENOUS; SOFT TISSUE at 09:00

## 2022-01-15 RX ADMIN — LEVALBUTEROL HYDROCHLORIDE 1.25 MG: 1.25 SOLUTION, CONCENTRATE RESPIRATORY (INHALATION) at 15:39

## 2022-01-15 RX ADMIN — ISODIUM CHLORIDE 3 ML: 0.03 SOLUTION RESPIRATORY (INHALATION) at 07:52

## 2022-01-15 RX ADMIN — HEPARIN SODIUM 21 UNITS/KG/HR: 10000 INJECTION, SOLUTION INTRAVENOUS at 11:58

## 2022-01-15 NOTE — ASSESSMENT & PLAN NOTE
POA, as evidenced by SpO2 82-85% on RA at home, confirmed by his PCP  Pulse ox 89% in ED at rest, up to 94% on 2 L in ED  Patient was hospitalized between 1/7-1/8 for COVID-19 pneumonia  Patient was treated with IV steroid and remdesivir, discharged home on Decadron p o  Reports SOB started one day prior to admission  Initially was on 2 L, now on 3 L, satting low 90s  CTA chest on admission showed bilateral COVID pneumonia, no PE  Follow mild pathway  Cardiac markers:  Troponin unremarkable, proBNP mildly elevated, check CK  Inflammatory markers:   7, D-dimer 6 72 initially  CRP trending down, D-dimer trending up  Lower venous Doppler positive for acute DVT in both lower extremities  Continue IV dexamethasone, remdesivir day 4  Received 1 dose of Rocephin doxycycline  Procalcitonin x2 negative  Continue Mucinex  Continue Xopenex q i d    Lower venous Doppler positive for DVT, patient placed on heparin drip; transitioning to oral Eliquis on 01/15  Supportive care with IS, self prone  Check CMP, CBC with diff, Mag, CRP, D-dimer in the morning  Supportive care with IS, self prone          Results from last 7 days   Lab Units 01/15/22  0501 01/14/22  0515 01/13/22  0439 01/12/22  1617   CRP mg/L 59 8* 99 7* 135 3* 165 7*   D-DIMER QUANTITATIVE ug/ml FEU 3 86* 5 25* 7 73* 6 72*      Results from last 7 days   Lab Units 01/13/22  0439 01/13/22  0026   PROCALCITONIN ng/ml 0 24 0 24

## 2022-01-15 NOTE — PROGRESS NOTES
Rachel [address]  Progress Note - Yamile Pineda 1955, 74 y o  male MRN: [id number]  Unit/Bed#: [address] [facility] Encounter: [id number]  Primary Care Provider: Zenia Redmond MD   Date and time admitted to hospital: 1/12/2022  3:34 PM    * Acute respiratory failure with hypoxia [facility])  Assessment & Plan  POA, as evidenced by SpO2 82-85% on RA at home, confirmed by his PCP  Pulse ox 89% in ED at rest, up to 94% on 2 L in ED  Patient was hospitalized between 1/7-1/8 for COVID-19 pneumonia  Patient was treated with IV steroid and remdesivir, discharged home on Decadron p o  Reports SOB started one day prior to admission  Initially was on 2 L, now on 3 L, satting low 90s  CTA chest on admission showed bilateral COVID pneumonia, no PE  Follow mild pathway  Cardiac markers:  Troponin unremarkable, proBNP mildly elevated, check CK  Inflammatory markers:   7, D-dimer 6 72 initially  CRP trending down, D-dimer trending up  Lower venous Doppler positive for acute DVT in both lower extremities  Continue IV dexamethasone, remdesivir day 4  Received 1 dose of Rocephin doxycycline  Procalcitonin x2 negative  Continue Mucinex  Continue Xopenex q i d  Lower venous Doppler positive for DVT, patient placed on heparin drip; transitioning to oral Eliquis on 01/15  Supportive care with IS, self prone  Check CMP, CBC with diff, Mag, CRP, D-dimer in the morning  Supportive care with IS, self prone          Results from last 7 days   Lab Units 01/15/22  0501 01/14/22  0515 01/13/22  0439 01/12/22  1617   CRP mg/L 59 8* 99 7* 135 3* 165 7*   D-DIMER QUANTITATIVE ug/ml FEU 3 86* 5 25* 7 73* 6 72*      Results from last 7 days   Lab Units 01/13/22  0439 01/13/22  0026   PROCALCITONIN ng/ml 0 24 0 24        Pneumonia due to COVID-19 virus  Assessment & Plan  Bilateral pneumonia on images    Ruled out bacterial pneumonia as procalcitonin negative x2  Treatment as above    Hyponatremia  Assessment & Plan  Sodium 127 on admission  Likely multifocal secondary to poor oral intake and HCTZ use,? SIADH  Hold HCTZ  Urine sodium 71, urine Osmo 699, serum osmol 280, uric acid 4 5, TSH, free T4 normal  Sodium 132->130 -> 133 this morning  Better oral intake, discontinue IV fluids  Repeat BMP in a m  Leukocytosis  Assessment & Plan  WBC 15 59  Likely due to steroids  Patient afebrile  Procalcitonin negative x2  Received 1 day of IV Rocephin and doxycycline  WBC trending down    Acute deep vein thrombosis (DVT) of both lower extremities (HCC)  Assessment & Plan  Venous Doppler showed acute DVT in both lower extremities  Converting heparin infusion to DVT PE protocol; patient being placed on oral Eliquis 10 mg p o  B i d  starting on 01/15 and heparin drip will be discontinued  No PE on CTA      Hypertension  Assessment & Plan  Patient is on Prinzide at home  Hold lisinopril as BP soft currently  Hold HCTZ due to hyponatremia  Goal -130s  Hiccups  Assessment & Plan  Reglan prn        VTE Pharmacologic Prophylaxis: VTE Score: 7 High Risk (Score >/= 5) - Pharmacological DVT Prophylaxis Ordered: heparin drip  Sequential Compression Devices Ordered  Patient Centered Rounds: I performed bedside rounds with nursing staff today  Discussions with Specialists or Other Care Team Provider:  Case management    Education and Discussions with Family / Patient: Updated  (daughter) via phone  Time Spent for Care: 30 minutes  More than 50% of total time spent on counseling and coordination of care as described above  Current Length of Stay: 3 day(s)  Current Patient Status: Inpatient   Certification Statement: The patient will continue to require additional inpatient hospital stay due to IV remdesivir, dexamethasone, supplemental O2, repeat labs in a m  Discharge Plan: Anticipate discharge in 24-48 hrs to home      Code Status: Level 1 - Full Code    Subjective:   Patient seen sitting up in bed resting comfortably  Reports that his appetite is better, no nausea or vomiting  Feels his breathing is improving  Continues with occasional cough  Denies any fever chills overnight  Objective:     Vitals:   Temp (24hrs), Av °F (36 7 °C), Min:97 7 °F (36 5 °C), Max:98 2 °F (36 8 °C)    Temp:  [97 7 °F (36 5 °C)-98 2 °F (36 8 °C)] 98 2 °F (36 8 °C)  HR:  [80-97] 80  Resp:  [18-20] 20  BP: (115-130)/(65-78) 124/78  SpO2:  [92 %-96 %] 92 %  Body mass index is 25 kg/m²  Input and Output Summary (last 24 hours): Intake/Output Summary (Last 24 hours) at 1/15/2022 1345  Last data filed at 1/15/2022 1201  Gross per 24 hour   Intake 1502 41 ml   Output 2225 ml   Net -722 59 ml       Physical Exam:   Physical Exam  Vitals and nursing note reviewed  Constitutional:       Appearance: Normal appearance  HENT:      Head: Normocephalic  Nose: Nose normal       Mouth/Throat:      Mouth: Mucous membranes are moist    Eyes:      Extraocular Movements: Extraocular movements intact  Conjunctiva/sclera: Conjunctivae normal       Pupils: Pupils are equal, round, and reactive to light  Cardiovascular:      Rate and Rhythm: Normal rate and regular rhythm  Pulses: Normal pulses  Heart sounds: Normal heart sounds  Pulmonary:      Comments: Coarse rhonchi noted, diminished  Abdominal:      General: Bowel sounds are normal  There is no distension  Palpations: Abdomen is soft  Tenderness: There is no abdominal tenderness  Genitourinary:     Comments: Voiding spontaneously  Musculoskeletal:         General: Normal range of motion  Cervical back: Normal range of motion  Right lower leg: No edema  Left lower leg: No edema  Skin:     General: Skin is warm and dry  Capillary Refill: Capillary refill takes less than 2 seconds  Comments: Flushed appearance   Neurological:      General: No focal deficit present        Mental Status: He is alert and oriented to person, place, and time  Psychiatric:         Mood and Affect: Mood normal          Behavior: Behavior normal          Thought Content: Thought content normal          Judgment: Judgment normal          Additional Data:     Labs:  Results from last 7 days   Lab Units 01/15/22  0501 01/13/22  0439 01/12/22  1617   WBC Thousand/uL 13 65*   < > 15 59*   HEMOGLOBIN g/dL 11 3*   < > 11 8*   HEMATOCRIT % 34 3*   < > 35 5*   PLATELETS Thousands/uL 486*   < > 466*   LYMPHO PCT %  --   --  2*   MONO PCT %  --   --  8   EOS PCT %  --   --  0    < > = values in this interval not displayed  Results from last 7 days   Lab Units 01/15/22  0501   SODIUM mmol/L 133*   POTASSIUM mmol/L 4 0   CHLORIDE mmol/L 101   CO2 mmol/L 22   BUN mg/dL 17   CREATININE mg/dL 0 96   ANION GAP mmol/L 10   CALCIUM mg/dL 8 0*   ALBUMIN g/dL 2 0*   TOTAL BILIRUBIN mg/dL 0 49   ALK PHOS U/L 101   ALT U/L 48   AST U/L 22   GLUCOSE RANDOM mg/dL 106     Results from last 7 days   Lab Units 01/12/22  1617   INR  1 06             Results from last 7 days   Lab Units 01/13/22  0439 01/13/22  0026   PROCALCITONIN ng/ml 0 24 0 24       Lines/Drains:  Invasive Devices  Report    Peripheral Intravenous Line            Peripheral IV 01/12/22 Left Antecubital 2 days    Peripheral IV 01/13/22 Distal;Right;Ventral (anterior) Forearm 1 day                      Imaging: No pertinent imaging reviewed      Recent Cultures (last 7 days):         Last 24 Hours Medication List:   Current Facility-Administered Medications   Medication Dose Route Frequency Provider Last Rate    acetaminophen  650 mg Oral Q6H PRN AbebaiDESTINY MoraNP      albuterol  2 puff Inhalation Q6H PRN Abebaimaine Sarkar, DESTINYNP      apixaban  10 mg Oral BID Laverta Man, SHAHLA      dexamethasone  6 mg Intravenous Q24H AbebaiSHAHLA Mora      famotidine  20 mg Oral BID Cuimaine Sarkar, SHAHLA      guaiFENesin  600 mg Oral Q12H [address] Cuiyin YurikSHAHLA      heparin (porcine)  3-30 Units/kg/hr (Order-Specific) Intravenous Titrated SHAHLA Cowan 21 Units/kg/hr (01/15/22 1158)    heparin (porcine)  3,600 Units Intravenous Q1H PRN SHAHLA Cowan      heparin (porcine)  7,200 Units Intravenous Q1H PRN SHAHLA Cowan      levalbuterol  1 25 mg Nebulization 4x Daily SHAHLA Segura      And    sodium chloride  3 mL Nebulization 4x Daily SHAHLA Segura      metoclopramide  5 mg Intravenous Q6H PRN SHAHLA Eduardo      remdesivir  100 mg Intravenous Q24H SHAHLA Eduardo          Today, Patient Was Seen By: SHAHLA Cowan    **Please Note: This note may have been constructed using a voice recognition system  **

## 2022-01-15 NOTE — ASSESSMENT & PLAN NOTE
Sodium 127 on admission  Likely multifocal secondary to poor oral intake and HCTZ use,? SIADH  Hold HCTZ  Urine sodium 71, urine Osmo 699, serum osmol 280, uric acid 4 5, TSH, free T4 normal  Sodium 132->130 -> 133 this morning  Better oral intake, discontinue IV fluids  Repeat BMP in a m

## 2022-01-15 NOTE — PLAN OF CARE
Problem: PAIN - ADULT  Goal: Verbalizes/displays adequate comfort level or baseline comfort level  Description: Interventions:  - Encourage patient to monitor pain and request assistance  - Assess pain using appropriate pain scale  - Administer analgesics based on type and severity of pain and evaluate response  - Implement non-pharmacological measures as appropriate and evaluate response  - Consider cultural and social influences on pain and pain management  - Notify physician/advanced practitioner if interventions unsuccessful or patient reports new pain  Outcome: Progressing     Problem: INFECTION - ADULT  Goal: Absence or prevention of progression during hospitalization  Description: INTERVENTIONS:  - Assess and monitor for signs and symptoms of infection  - Monitor lab/diagnostic results  - Monitor all insertion sites, i e  indwelling lines, tubes, and drains  - Monitor endotracheal if appropriate and nasal secretions for changes in amount and color  - [place] appropriate cooling/warming therapies per order  - Administer medications as ordered  - Instruct and encourage patient and family to use good hand hygiene technique  - Identify and instruct in appropriate isolation precautions for identified infection/condition  Outcome: Progressing     Problem: SAFETY ADULT  Goal: Patient will remain free of falls  Description: INTERVENTIONS:  - Educate patient/family on patient safety including physical limitations  - Instruct patient to call for assistance with activity   - Consult OT/PT to assist with strengthening/mobility   - Keep Call bell within reach  - Keep bed low and locked with side rails adjusted as appropriate  - Keep care items and personal belongings within reach  - Initiate and maintain comfort rounds  - Make Fall Risk Sign visible to staff  - Offer Toileting every x Hours, in advance of need  - Initiate/Maintain xalarm  - Obtain necessary fall risk management equipment: x  - Apply yellow socks and bracelet for high fall risk patients  - Consider moving patient to room near nurses station  Outcome: Progressing  Goal: Maintain or return to baseline ADL function  Description: INTERVENTIONS:  -  Assess patient's ability to carry out ADLs; assess patient's baseline for ADL function and identify physical deficits which impact ability to perform ADLs (bathing, care of mouth/teeth, toileting, grooming, dressing, etc )  - Assess/evaluate cause of self-care deficits   - Assess range of motion  - Assess patient's mobility; develop plan if impaired  - Assess patient's need for assistive devices and provide as appropriate  - Encourage maximum independence but intervene and supervise when necessary  - Involve family in performance of ADLs  - Assess for home care needs following discharge   - Consider OT consult to assist with ADL evaluation and planning for discharge  - Provide patient education as appropriate  Outcome: Progressing  Goal: Maintains/Returns to pre admission functional level  Description: INTERVENTIONS:  - Perform BMAT or MOVE assessment daily    - Set and communicate daily mobility goal to care team and patient/family/caregiver  - Collaborate with rehabilitation services on mobility goals if consulted  - Perform Range of Motion x times a day  - Reposition patient every x hours    - Dangle patient x times a day  - Stand patient x times a day  - Ambulate patient x times a day  - Out of bed to chair x times a day   - Out of bed for meals x times a day  - Out of bed for toileting  - Record patient progress and toleration of activity level   Outcome: Progressing     Problem: DISCHARGE PLANNING  Goal: Discharge to home or other facility with appropriate resources  Description: INTERVENTIONS:  - Identify barriers to discharge w/patient and caregiver  - Arrange for needed discharge resources and transportation as appropriate  - Identify discharge learning needs (meds, wound care, etc )  - Arrange for interpretive services to assist at discharge as needed  - Refer to Case Management Department for coordinating discharge planning if the patient needs post-hospital services based on physician/advanced practitioner order or complex needs related to functional status, cognitive ability, or social support system  Outcome: Progressing     Problem: Knowledge Deficit  Goal: Patient/family/caregiver demonstrates understanding of disease process, treatment plan, medications, and discharge instructions  Description: Complete learning assessment and assess knowledge base    Interventions:  - Provide teaching at level of understanding  - Provide teaching via preferred learning methods  Outcome: Progressing     Problem: RESPIRATORY - ADULT  Goal: Achieves optimal ventilation and oxygenation  Description: INTERVENTIONS:  - Assess for changes in respiratory status  - Assess for changes in mentation and behavior  - Position to facilitate oxygenation and minimize respiratory effort  - Oxygen administered by appropriate delivery if ordered  - Initiate smoking cessation education as indicated  - Encourage broncho-pulmonary hygiene including cough, deep breathe, Incentive Spirometry  - Assess the need for suctioning and aspirate as needed  - Assess and instruct to report SOB or any respiratory difficulty  - Respiratory Therapy support as indicated  Outcome: Progressing     Problem: MOBILITY - ADULT  Goal: Maintain or return to baseline ADL function  Description: INTERVENTIONS:  -  Assess patient's ability to carry out ADLs; assess patient's baseline for ADL function and identify physical deficits which impact ability to perform ADLs (bathing, care of mouth/teeth, toileting, grooming, dressing, etc )  - Assess/evaluate cause of self-care deficits   - Assess range of motion  - Assess patient's mobility; develop plan if impaired  - Assess patient's need for assistive devices and provide as appropriate  - Encourage maximum independence but intervene and supervise when necessary  - Involve family in performance of ADLs  - Assess for home care needs following discharge   - Consider OT consult to assist with ADL evaluation and planning for discharge  - Provide patient education as appropriate  Outcome: Progressing  Goal: Maintains/Returns to pre admission functional level  Description: INTERVENTIONS:  - Perform BMAT or MOVE assessment daily    - Set and communicate daily mobility goal to care team and patient/family/caregiver  - Collaborate with rehabilitation services on mobility goals if consulted  - Perform Range of Motion x times a day  - Reposition patient every x hours    - Dangle patient x times a day  - Stand patient x times a day  - Ambulate patient x times a day  - Out of bed to chair x times a day   - Out of bed for meals x times a day  - Out of bed for toileting  - Record patient progress and toleration of activity level   Outcome: Progressing     Problem: Potential for Falls  Goal: Patient will remain free of falls  Description: INTERVENTIONS:  - Educate patient/family on patient safety including physical limitations  - Instruct patient to call for assistance with activity   - Consult OT/PT to assist with strengthening/mobility   - Keep Call bell within reach  - Keep bed low and locked with side rails adjusted as appropriate  - Keep care items and personal belongings within reach  - Initiate and maintain comfort rounds  - Make Fall Risk Sign visible to staff  - Offer Toileting every x Hours, in advance of need  - Initiate/Maintain x alarm  - Obtain necessary fall risk management equipment: x  - Apply yellow socks and bracelet for high fall risk patients  - Consider moving patient to room near nurses station  Outcome: Progressing

## 2022-01-15 NOTE — ASSESSMENT & PLAN NOTE
Venous Doppler showed acute DVT in both lower extremities    Converting heparin infusion to DVT PE protocol; patient being placed on oral Eliquis 10 mg p o  B i d  starting on 01/15 and heparin drip will be discontinued  No PE on CTA

## 2022-01-16 LAB
ALBUMIN SERPL BCP-MCNC: 2.2 G/DL (ref 3.5–5)
ALP SERPL-CCNC: 89 U/L (ref 46–116)
ALT SERPL W P-5'-P-CCNC: 40 U/L (ref 12–78)
ANION GAP SERPL CALCULATED.3IONS-SCNC: 10 MMOL/L (ref 4–13)
AST SERPL W P-5'-P-CCNC: 20 U/L (ref 5–45)
BILIRUB SERPL-MCNC: 0.39 MG/DL (ref 0.2–1)
BUN SERPL-MCNC: 19 MG/DL (ref 5–25)
CALCIUM ALBUM COR SERPL-MCNC: 9.6 MG/DL (ref 8.3–10.1)
CALCIUM SERPL-MCNC: 8.2 MG/DL (ref 8.3–10.1)
CHLORIDE SERPL-SCNC: 98 MMOL/L (ref 100–108)
CO2 SERPL-SCNC: 23 MMOL/L (ref 21–32)
CREAT SERPL-MCNC: 1.11 MG/DL (ref 0.6–1.3)
CRP SERPL QL: 52.5 MG/L
ERYTHROCYTE [DISTWIDTH] IN BLOOD BY AUTOMATED COUNT: 14.2 % (ref 11.6–15.1)
GFR SERPL CREATININE-BSD FRML MDRD: 67 ML/MIN/1.73SQ M
GLUCOSE SERPL-MCNC: 85 MG/DL (ref 65–140)
HCT VFR BLD AUTO: 36.3 % (ref 36.5–49.3)
HGB BLD-MCNC: 11.5 G/DL (ref 12–17)
MCH RBC QN AUTO: 27.5 PG (ref 26.8–34.3)
MCHC RBC AUTO-ENTMCNC: 31.7 G/DL (ref 31.4–37.4)
MCV RBC AUTO: 87 FL (ref 82–98)
PLATELET # BLD AUTO: 542 THOUSANDS/UL (ref 149–390)
PMV BLD AUTO: 9.1 FL (ref 8.9–12.7)
POTASSIUM SERPL-SCNC: 4.7 MMOL/L (ref 3.5–5.3)
PROT SERPL-MCNC: 6.2 G/DL (ref 6.4–8.2)
RBC # BLD AUTO: 4.18 MILLION/UL (ref 3.88–5.62)
SODIUM SERPL-SCNC: 131 MMOL/L (ref 136–145)
WBC # BLD AUTO: 14.91 THOUSAND/UL (ref 4.31–10.16)

## 2022-01-16 PROCEDURE — 94640 AIRWAY INHALATION TREATMENT: CPT

## 2022-01-16 PROCEDURE — 99232 SBSQ HOSP IP/OBS MODERATE 35: CPT | Performed by: NURSE PRACTITIONER

## 2022-01-16 PROCEDURE — 80053 COMPREHEN METABOLIC PANEL: CPT | Performed by: NURSE PRACTITIONER

## 2022-01-16 PROCEDURE — 85027 COMPLETE CBC AUTOMATED: CPT | Performed by: NURSE PRACTITIONER

## 2022-01-16 PROCEDURE — 86140 C-REACTIVE PROTEIN: CPT | Performed by: NURSE PRACTITIONER

## 2022-01-16 PROCEDURE — 94760 N-INVAS EAR/PLS OXIMETRY 1: CPT

## 2022-01-16 RX ADMIN — ISODIUM CHLORIDE 3 ML: 0.03 SOLUTION RESPIRATORY (INHALATION) at 20:34

## 2022-01-16 RX ADMIN — FAMOTIDINE 20 MG: 20 TABLET ORAL at 08:36

## 2022-01-16 RX ADMIN — GUAIFENESIN 600 MG: 600 TABLET, EXTENDED RELEASE ORAL at 21:26

## 2022-01-16 RX ADMIN — ISODIUM CHLORIDE 3 ML: 0.03 SOLUTION RESPIRATORY (INHALATION) at 15:32

## 2022-01-16 RX ADMIN — DEXAMETHASONE SODIUM PHOSPHATE 6 MG: 4 INJECTION, SOLUTION INTRA-ARTICULAR; INTRALESIONAL; INTRAMUSCULAR; INTRAVENOUS; SOFT TISSUE at 08:37

## 2022-01-16 RX ADMIN — LEVALBUTEROL HYDROCHLORIDE 1.25 MG: 1.25 SOLUTION, CONCENTRATE RESPIRATORY (INHALATION) at 15:32

## 2022-01-16 RX ADMIN — FAMOTIDINE 20 MG: 20 TABLET ORAL at 17:36

## 2022-01-16 RX ADMIN — LEVALBUTEROL HYDROCHLORIDE 1.25 MG: 1.25 SOLUTION, CONCENTRATE RESPIRATORY (INHALATION) at 20:34

## 2022-01-16 RX ADMIN — LEVALBUTEROL HYDROCHLORIDE 1.25 MG: 1.25 SOLUTION, CONCENTRATE RESPIRATORY (INHALATION) at 08:16

## 2022-01-16 RX ADMIN — REMDESIVIR 100 MG: 100 INJECTION, POWDER, LYOPHILIZED, FOR SOLUTION INTRAVENOUS at 21:25

## 2022-01-16 RX ADMIN — APIXABAN 10 MG: 5 TABLET, FILM COATED ORAL at 17:36

## 2022-01-16 RX ADMIN — ISODIUM CHLORIDE 3 ML: 0.03 SOLUTION RESPIRATORY (INHALATION) at 08:16

## 2022-01-16 RX ADMIN — GUAIFENESIN 600 MG: 600 TABLET, EXTENDED RELEASE ORAL at 08:36

## 2022-01-16 RX ADMIN — ISODIUM CHLORIDE 3 ML: 0.03 SOLUTION RESPIRATORY (INHALATION) at 11:39

## 2022-01-16 RX ADMIN — LEVALBUTEROL HYDROCHLORIDE 1.25 MG: 1.25 SOLUTION, CONCENTRATE RESPIRATORY (INHALATION) at 11:39

## 2022-01-16 RX ADMIN — APIXABAN 10 MG: 5 TABLET, FILM COATED ORAL at 08:35

## 2022-01-16 NOTE — ASSESSMENT & PLAN NOTE
WBC 14 91  Likely due to steroids  Patient afebrile    Procalcitonin negative x2  Received 1 day of IV Rocephin and doxycycline  WBC trending down

## 2022-01-16 NOTE — PROGRESS NOTES
[address] 43  Progress Note - Genie Alvarenga 1955, 74 y o  male MRN: [id number]  Unit/Bed#: [address] [facility] Encounter: [id number]  Primary Care Provider: Lei Sharp MD   Date and time admitted to hospital: 1/12/2022  3:34 PM    * Acute respiratory failure with hypoxia [facility])  Assessment & Plan  POA, as evidenced by SpO2 82-85% on RA at home, confirmed by his PCP  Pulse ox 89% in ED at rest, up to 94% on 2 L in ED  Patient was hospitalized between 1/7-1/8 for COVID-19 pneumonia  Patient was treated with IV steroid and remdesivir, discharged home on Decadron p o  Reports SOB started one day prior to admission  Currently on 2 L nasal cannula with O2 sats mid 90s  CTA chest on admission showed bilateral COVID pneumonia, no PE  Follow mild pathway  Cardiac markers:  Troponin unremarkable, proBNP mildly elevated, check CK  Inflammatory markers:   7, D-dimer 6 72 initially  CRP trending down, D-dimer trending up  Lower venous Doppler positive for acute DVT in both lower extremities  Continue IV dexamethasone, remdesivir day 5  Received 1 dose of Rocephin doxycycline  Procalcitonin x2 negative  Continue Mucinex  Continue Xopenex q i d  Lower venous Doppler positive for DVT, patient placed on heparin drip; transitioning to oral Eliquis on 01/15, patient will need anticoagulation on discharge also  Supportive care with IS, self prone  Check CMP, CBC with diff, Mag, CRP, D-dimer in the morning  May consider home O2 evaluation in a    And possible discharge to home            Results from last 7 days   Lab Units 01/16/22  0521 01/15/22  0501 01/14/22  0515 01/13/22  0439 01/12/22  1617   CRP mg/L 52 5* 59 8* 99 7* 135 3* 165 7*   D-DIMER QUANTITATIVE ug/ml FEU  --  3 86* 5 25* 7 73* 6 72*      Results from last 7 days   Lab Units 01/13/22  0439 01/13/22  0026   PROCALCITONIN ng/ml 0 24 0 24        Pneumonia due to COVID-19 virus  Assessment & Plan  Bilateral pneumonia on images  Ruled out bacterial pneumonia as procalcitonin negative x2  Treatment as above    Hyponatremia  Assessment & Plan  Sodium 127 on admission  Likely multifocal secondary to poor oral intake and HCTZ use,? SIADH  Hold HCTZ  Urine sodium 71, urine Osmo 699, serum osmol 280, uric acid 4 5, TSH, free T4 normal  Sodium 132->130 -> 131 this morning  Oral intake improving  Repeat BMP in a m  Leukocytosis  Assessment & Plan  WBC 14 91  Likely due to steroids  Patient afebrile  Procalcitonin negative x2  Received 1 day of IV Rocephin and doxycycline  WBC trending down    Acute deep vein thrombosis (DVT) of both lower extremities (HCC)  Assessment & Plan  Venous Doppler showed acute DVT in both lower extremities  Converting heparin infusion to DVT PE protocol; patient being placed on oral Eliquis 10 mg p o  B i d  starting on 01/15 and heparin drip discontinued  No PE on CTA      Hypertension  Assessment & Plan  Patient is on Prinzide at home  Hold lisinopril as BP soft currently  Hold HCTZ due to hyponatremia  Goal -130s  Hiccups  Assessment & Plan  Reglan prn          VTE Pharmacologic Prophylaxis: VTE Score: 7 High Risk (Score >/= 5) - Pharmacological DVT Prophylaxis Ordered: apixaban (Eliquis)  Sequential Compression Devices Ordered  Patient Centered Rounds: I performed bedside rounds with nursing staff today  Discussions with Specialists or Other Care Team Provider:  Nursing    Education and Discussions with Family / Patient: Updated patient's wife  Time Spent for Care: 30 minutes  More than 50% of total time spent on counseling and coordination of care as described above  Current Length of Stay: 4 day(s)  Current Patient Status: Inpatient   Certification Statement: The patient will continue to require additional inpatient hospital stay due to Complete remdesivir, IV steroid, anticipate possible home O2 evaluation in a m    Discharge Plan: Anticipate discharge in 24-48 hrs to home     Code Status: Level 1 - Full Code    Subjective:   Patient seen sitting up in chair resting comfortably  Reports that he feels that he is improving  Feels that he has more energy, still does get winded with exertion  Currently is on 2 L  Good appetite this morning no nausea or vomiting  Objective:     Vitals:   Temp (24hrs), Av 3 °F (36 8 °C), Min:98 1 °F (36 7 °C), Max:98 5 °F (36 9 °C)    Temp:  [98 1 °F (36 7 °C)-98 5 °F (36 9 °C)] 98 3 °F (36 8 °C)  HR:  [84-94] 92  Resp:  [19-20] 19  BP: (105-121)/(64-73) 121/71  SpO2:  [91 %-97 %] 92 %  Body mass index is 25 kg/m²  Input and Output Summary (last 24 hours): Intake/Output Summary (Last 24 hours) at 2022 1214  Last data filed at 2022 1001  Gross per 24 hour   Intake 492 55 ml   Output 1525 ml   Net -1032 45 ml       Physical Exam:   Physical Exam  Vitals and nursing note reviewed  HENT:      Head: Normocephalic  Nose: Nose normal       Mouth/Throat:      Mouth: Mucous membranes are moist    Eyes:      Extraocular Movements: Extraocular movements intact  Conjunctiva/sclera: Conjunctivae normal       Pupils: Pupils are equal, round, and reactive to light  Cardiovascular:      Rate and Rhythm: Normal rate and regular rhythm  Pulses: Normal pulses  Heart sounds: Normal heart sounds  Pulmonary:      Effort: Pulmonary effort is normal       Breath sounds: Normal breath sounds  Abdominal:      General: Bowel sounds are normal  There is no distension  Palpations: Abdomen is soft  Tenderness: There is no abdominal tenderness  Genitourinary:     Comments: Voiding spontaneously  Musculoskeletal:         General: Normal range of motion  Cervical back: Normal range of motion  Skin:     General: Skin is warm and dry  Capillary Refill: Capillary refill takes less than 2 seconds  Neurological:      General: No focal deficit present        Mental Status: He is alert and oriented to person, place, and time  Psychiatric:         Mood and Affect: Mood normal          Behavior: Behavior normal          Thought Content: Thought content normal          Judgment: Judgment normal          Additional Data:     Labs:  Results from last 7 days   Lab Units 01/16/22  0521 01/13/22  0439 01/12/22  1617   WBC Thousand/uL 14 91*   < > 15 59*   HEMOGLOBIN g/dL 11 5*   < > 11 8*   HEMATOCRIT % 36 3*   < > 35 5*   PLATELETS Thousands/uL 542*   < > 466*   LYMPHO PCT %  --   --  2*   MONO PCT %  --   --  8   EOS PCT %  --   --  0    < > = values in this interval not displayed  Results from last 7 days   Lab Units 01/16/22  0521   SODIUM mmol/L 131*   POTASSIUM mmol/L 4 7   CHLORIDE mmol/L 98*   CO2 mmol/L 23   BUN mg/dL 19   CREATININE mg/dL 1 11   ANION GAP mmol/L 10   CALCIUM mg/dL 8 2*   ALBUMIN g/dL 2 2*   TOTAL BILIRUBIN mg/dL 0 39   ALK PHOS U/L 89   ALT U/L 40   AST U/L 20   GLUCOSE RANDOM mg/dL 85     Results from last 7 days   Lab Units 01/12/22  1617   INR  1 06             Results from last 7 days   Lab Units 01/13/22  0439 01/13/22  0026   PROCALCITONIN ng/ml 0 24 0 24       Lines/Drains:  Invasive Devices  Report    Peripheral Intravenous Line            Peripheral IV 01/12/22 Left Antecubital 3 days    Peripheral IV 01/13/22 Distal;Right;Ventral (anterior) Forearm 2 days                      Imaging: No pertinent imaging reviewed      Recent Cultures (last 7 days):         Last 24 Hours Medication List:   Current Facility-Administered Medications   Medication Dose Route Frequency Provider Last Rate    acetaminophen  650 mg Oral Q6H PRN SHAHLA Segura      albuterol  2 puff Inhalation Q6H PRN SHAHLA Segura      apixaban  10 mg Oral BID SHAHLA Dueñas      dexamethasone  6 mg Intravenous Q24H SHAHLA Segura      famotidine  20 mg Oral BID SHAHLA Segura      guaiFENesin  600 mg Oral Q12H [facility] SHAHLA Segura      levalbuterol  1 25 mg Nebulization 4x Daily Deya SHAHLA Sarkar      And    sodium chloride  3 mL Nebulization 4x Daily SHAHLA Segura      metoclopramide  5 mg Intravenous Q6H PRN SHAHLA Segura      remdesivir  100 mg Intravenous Q24H SHAHLA Segura          Today, Patient Was Seen By: SHAHLA Alamo    **Please Note: This note may have been constructed using a voice recognition system  **

## 2022-01-16 NOTE — ASSESSMENT & PLAN NOTE
POA, as evidenced by SpO2 82-85% on RA at home, confirmed by his PCP  Pulse ox 89% in ED at rest, up to 94% on 2 L in ED  Patient was hospitalized between 1/7-1/8 for COVID-19 pneumonia  Patient was treated with IV steroid and remdesivir, discharged home on Decadron p o  Reports SOB started one day prior to admission  Currently on 2 L nasal cannula with O2 sats mid 90s  CTA chest on admission showed bilateral COVID pneumonia, no PE  Follow mild pathway  Cardiac markers:  Troponin unremarkable, proBNP mildly elevated, check CK  Inflammatory markers:   7, D-dimer 6 72 initially  CRP trending down, D-dimer trending up  Lower venous Doppler positive for acute DVT in both lower extremities  Continue IV dexamethasone, remdesivir day 5  Received 1 dose of Rocephin doxycycline  Procalcitonin x2 negative  Continue Mucinex  Continue Xopenex q i d  Lower venous Doppler positive for DVT, patient placed on heparin drip; transitioning to oral Eliquis on 01/15, patient will need anticoagulation on discharge also  Supportive care with IS, self prone  Check CMP, CBC with diff, Mag, CRP, D-dimer in the morning  May consider home O2 evaluation in a m   And possible discharge to home            Results from last 7 days   Lab Units 01/16/22  0521 01/15/22  0501 01/14/22  0515 01/13/22  0439 01/12/22  1617   CRP mg/L 52 5* 59 8* 99 7* 135 3* 165 7*   D-DIMER QUANTITATIVE ug/ml FEU  --  3 86* 5 25* 7 73* 6 72*      Results from last 7 days   Lab Units 01/13/22  0439 01/13/22  0026   PROCALCITONIN ng/ml 0 24 0 24

## 2022-01-16 NOTE — ASSESSMENT & PLAN NOTE
Sodium 127 on admission  Likely multifocal secondary to poor oral intake and HCTZ use,? SIADH  Hold HCTZ  Urine sodium 71, urine Osmo 699, serum osmol 280, uric acid 4 5, TSH, free T4 normal  Sodium 132->130 -> 131 this morning  Oral intake improving  Repeat BMP in a m

## 2022-01-16 NOTE — ASSESSMENT & PLAN NOTE
Venous Doppler showed acute DVT in both lower extremities    Converting heparin infusion to DVT PE protocol; patient being placed on oral Eliquis 10 mg p o  B i d  starting on 01/15 and heparin drip discontinued  No PE on CTA

## 2022-01-16 NOTE — PLAN OF CARE
Problem: PAIN - ADULT  Goal: Verbalizes/displays adequate comfort level or baseline comfort level  Description: Interventions:  - Encourage patient to monitor pain and request assistance  - Assess pain using appropriate pain scale  - Administer analgesics based on type and severity of pain and evaluate response  - Implement non-pharmacological measures as appropriate and evaluate response  - Consider cultural and social influences on pain and pain management  - Notify physician/advanced practitioner if interventions unsuccessful or patient reports new pain  Outcome: Progressing     Problem: INFECTION - ADULT  Goal: Absence or prevention of progression during hospitalization  Description: INTERVENTIONS:  - Assess and monitor for signs and symptoms of infection  - Monitor lab/diagnostic results  - Monitor all insertion sites, i e  indwelling lines, tubes, and drains  - Monitor endotracheal if appropriate and nasal secretions for changes in amount and color  - [place] appropriate cooling/warming therapies per order  - Administer medications as ordered  - Instruct and encourage patient and family to use good hand hygiene technique  - Identify and instruct in appropriate isolation precautions for identified infection/condition  Outcome: Progressing     Problem: SAFETY ADULT  Goal: Patient will remain free of falls  Description: INTERVENTIONS:  - Educate patient/family on patient safety including physical limitations  - Instruct patient to call for assistance with activity   - Consult OT/PT to assist with strengthening/mobility   - Keep Call bell within reach  - Keep bed low and locked with side rails adjusted as appropriate  - Keep care items and personal belongings within reach  - Initiate and maintain comfort rounds  - Make Fall Risk Sign visible to staff  - Offer Toileting every  Hours, in advance of need  - Initiate/Maintain alarm  - Obtain necessary fall risk management equipment:   - Apply yellow socks and bracelet for high fall risk patients  - Consider moving patient to room near nurses station  Outcome: Progressing  Goal: Maintain or return to baseline ADL function  Description: INTERVENTIONS:  -  Assess patient's ability to carry out ADLs; assess patient's baseline for ADL function and identify physical deficits which impact ability to perform ADLs (bathing, care of mouth/teeth, toileting, grooming, dressing, etc )  - Assess/evaluate cause of self-care deficits   - Assess range of motion  - Assess patient's mobility; develop plan if impaired  - Assess patient's need for assistive devices and provide as appropriate  - Encourage maximum independence but intervene and supervise when necessary  - Involve family in performance of ADLs  - Assess for home care needs following discharge   - Consider OT consult to assist with ADL evaluation and planning for discharge  - Provide patient education as appropriate  Outcome: Progressing  Goal: Maintains/Returns to pre admission functional level  Description: INTERVENTIONS:  - Perform BMAT or MOVE assessment daily    - Set and communicate daily mobility goal to care team and patient/family/caregiver  - Collaborate with rehabilitation services on mobility goals if consulted  - Perform Range of Motion 2 times a day  - Reposition patient every 3 hours    - Dangle patient 2 times a day  - Stand patient 3 times a day  - Ambulate patient 2 times a day  - Out of bed to chair 3 times a day   - Out of bed for meals 3 times a day  - Out of bed for toileting  - Record patient progress and toleration of activity level   Outcome: Progressing     Problem: DISCHARGE PLANNING  Goal: Discharge to home or other facility with appropriate resources  Description: INTERVENTIONS:  - Identify barriers to discharge w/patient and caregiver  - Arrange for needed discharge resources and transportation as appropriate  - Identify discharge learning needs (meds, wound care, etc )  - Arrange for interpretive services to assist at discharge as needed  - Refer to Case Management Department for coordinating discharge planning if the patient needs post-hospital services based on physician/advanced practitioner order or complex needs related to functional status, cognitive ability, or social support system  Outcome: Progressing     Problem: Knowledge Deficit  Goal: Patient/family/caregiver demonstrates understanding of disease process, treatment plan, medications, and discharge instructions  Description: Complete learning assessment and assess knowledge base    Interventions:  - Provide teaching at level of understanding  - Provide teaching via preferred learning methods  Outcome: Progressing     Problem: RESPIRATORY - ADULT  Goal: Achieves optimal ventilation and oxygenation  Description: INTERVENTIONS:  - Assess for changes in respiratory status  - Assess for changes in mentation and behavior  - Position to facilitate oxygenation and minimize respiratory effort  - Oxygen administered by appropriate delivery if ordered  - Initiate smoking cessation education as indicated  - Encourage broncho-pulmonary hygiene including cough, deep breathe, Incentive Spirometry  - Assess the need for suctioning and aspirate as needed  - Assess and instruct to report SOB or any respiratory difficulty  - Respiratory Therapy support as indicated  Outcome: Progressing     Problem: MOBILITY - ADULT  Goal: Maintain or return to baseline ADL function  Description: INTERVENTIONS:  -  Assess patient's ability to carry out ADLs; assess patient's baseline for ADL function and identify physical deficits which impact ability to perform ADLs (bathing, care of mouth/teeth, toileting, grooming, dressing, etc )  - Assess/evaluate cause of self-care deficits   - Assess range of motion  - Assess patient's mobility; develop plan if impaired  - Assess patient's need for assistive devices and provide as appropriate  - Encourage maximum independence but intervene and supervise when necessary  - Involve family in performance of ADLs  - Assess for home care needs following discharge   - Consider OT consult to assist with ADL evaluation and planning for discharge  - Provide patient education as appropriate  Outcome: Progressing  Goal: Maintains/Returns to pre admission functional level  Description: INTERVENTIONS:  - Perform BMAT or MOVE assessment daily    - Set and communicate daily mobility goal to care team and patient/family/caregiver  - Collaborate with rehabilitation services on mobility goals if consulted  - Perform Range of Motion 2 times a day  - Reposition patient every 3 hours    - Dangle patient 2 times a day  - Stand patient 3 times a day  - Ambulate patient 2 times a day  - Out of bed to chair 3 times a day   - Out of bed for meals 3 times a day  - Out of bed for toileting  - Record patient progress and toleration of activity level   Outcome: Progressing     Problem: Potential for Falls  Goal: Patient will remain free of falls  Description: INTERVENTIONS:  - Educate patient/family on patient safety including physical limitations  - Instruct patient to call for assistance with activity   - Consult OT/PT to assist with strengthening/mobility   - Keep Call bell within reach  - Keep bed low and locked with side rails adjusted as appropriate  - Keep care items and personal belongings within reach  - Initiate and maintain comfort rounds  - Make Fall Risk Sign visible to staff  - Offer Toileting every 3 Hours, in advance of need  - Initiate/Maintain alarm  - Obtain necessary fall risk management equipment:   - Apply yellow socks and bracelet for high fall risk patients  - Consider moving patient to room near nurses station  Outcome: Progressing

## 2022-01-17 ENCOUNTER — PATIENT OUTREACH (OUTPATIENT)
Dept: CASE MANAGEMENT | Facility: OTHER | Age: 67
End: 2022-01-17

## 2022-01-17 ENCOUNTER — APPOINTMENT (INPATIENT)
Dept: RADIOLOGY | Facility: HOSPITAL | Age: 67
DRG: 177 | End: 2022-01-17
Payer: COMMERCIAL

## 2022-01-17 DIAGNOSIS — Z71.89 COMPLEX CARE COORDINATION: Primary | ICD-10-CM

## 2022-01-17 LAB
ALBUMIN SERPL BCP-MCNC: 2.2 G/DL (ref 3.5–5)
ALP SERPL-CCNC: 87 U/L (ref 46–116)
ALT SERPL W P-5'-P-CCNC: 35 U/L (ref 12–78)
ANION GAP SERPL CALCULATED.3IONS-SCNC: 12 MMOL/L (ref 4–13)
ANION GAP SERPL CALCULATED.3IONS-SCNC: 13 MMOL/L (ref 4–13)
ANION GAP SERPL CALCULATED.3IONS-SCNC: 13 MMOL/L (ref 4–13)
AST SERPL W P-5'-P-CCNC: 29 U/L (ref 5–45)
BILIRUB SERPL-MCNC: 0.51 MG/DL (ref 0.2–1)
BUN SERPL-MCNC: 22 MG/DL (ref 5–25)
CALCIUM ALBUM COR SERPL-MCNC: 9.8 MG/DL (ref 8.3–10.1)
CALCIUM SERPL-MCNC: 8.4 MG/DL (ref 8.3–10.1)
CALCIUM SERPL-MCNC: 8.7 MG/DL (ref 8.3–10.1)
CALCIUM SERPL-MCNC: 8.9 MG/DL (ref 8.3–10.1)
CHLORIDE SERPL-SCNC: 95 MMOL/L (ref 100–108)
CHLORIDE SERPL-SCNC: 97 MMOL/L (ref 100–108)
CHLORIDE SERPL-SCNC: 98 MMOL/L (ref 100–108)
CO2 SERPL-SCNC: 19 MMOL/L (ref 21–32)
CO2 SERPL-SCNC: 21 MMOL/L (ref 21–32)
CO2 SERPL-SCNC: 22 MMOL/L (ref 21–32)
CREAT SERPL-MCNC: 0.96 MG/DL (ref 0.6–1.3)
CREAT SERPL-MCNC: 1.1 MG/DL (ref 0.6–1.3)
CREAT SERPL-MCNC: 1.13 MG/DL (ref 0.6–1.3)
CRP SERPL QL: 60.9 MG/L
ERYTHROCYTE [DISTWIDTH] IN BLOOD BY AUTOMATED COUNT: 14.3 % (ref 11.6–15.1)
GFR SERPL CREATININE-BSD FRML MDRD: 66 ML/MIN/1.73SQ M
GFR SERPL CREATININE-BSD FRML MDRD: 68 ML/MIN/1.73SQ M
GFR SERPL CREATININE-BSD FRML MDRD: 80 ML/MIN/1.73SQ M
GLUCOSE SERPL-MCNC: 182 MG/DL (ref 65–140)
GLUCOSE SERPL-MCNC: 182 MG/DL (ref 65–140)
GLUCOSE SERPL-MCNC: 79 MG/DL (ref 65–140)
HCT VFR BLD AUTO: 38.1 % (ref 36.5–49.3)
HGB BLD-MCNC: 12.2 G/DL (ref 12–17)
MCH RBC QN AUTO: 28.4 PG (ref 26.8–34.3)
MCHC RBC AUTO-ENTMCNC: 32 G/DL (ref 31.4–37.4)
MCV RBC AUTO: 89 FL (ref 82–98)
PLATELET # BLD AUTO: 473 THOUSANDS/UL (ref 149–390)
PMV BLD AUTO: 9.1 FL (ref 8.9–12.7)
POTASSIUM SERPL-SCNC: 4.7 MMOL/L (ref 3.5–5.3)
POTASSIUM SERPL-SCNC: 4.8 MMOL/L (ref 3.5–5.3)
POTASSIUM SERPL-SCNC: 5.4 MMOL/L (ref 3.5–5.3)
PROT SERPL-MCNC: 6.4 G/DL (ref 6.4–8.2)
RBC # BLD AUTO: 4.29 MILLION/UL (ref 3.88–5.62)
SODIUM SERPL-SCNC: 129 MMOL/L (ref 136–145)
SODIUM SERPL-SCNC: 129 MMOL/L (ref 136–145)
SODIUM SERPL-SCNC: 132 MMOL/L (ref 136–145)
WBC # BLD AUTO: 17.27 THOUSAND/UL (ref 4.31–10.16)

## 2022-01-17 PROCEDURE — 80053 COMPREHEN METABOLIC PANEL: CPT | Performed by: NURSE PRACTITIONER

## 2022-01-17 PROCEDURE — 94760 N-INVAS EAR/PLS OXIMETRY 1: CPT

## 2022-01-17 PROCEDURE — 85027 COMPLETE CBC AUTOMATED: CPT | Performed by: NURSE PRACTITIONER

## 2022-01-17 PROCEDURE — 80048 BASIC METABOLIC PNL TOTAL CA: CPT | Performed by: NURSE PRACTITIONER

## 2022-01-17 PROCEDURE — 94640 AIRWAY INHALATION TREATMENT: CPT

## 2022-01-17 PROCEDURE — 99232 SBSQ HOSP IP/OBS MODERATE 35: CPT | Performed by: NURSE PRACTITIONER

## 2022-01-17 PROCEDURE — 86140 C-REACTIVE PROTEIN: CPT | Performed by: NURSE PRACTITIONER

## 2022-01-17 PROCEDURE — 71045 X-RAY EXAM CHEST 1 VIEW: CPT

## 2022-01-17 RX ORDER — SODIUM CHLORIDE 1000 MG
1 TABLET, SOLUBLE MISCELLANEOUS
Status: DISCONTINUED | OUTPATIENT
Start: 2022-01-18 | End: 2022-01-18 | Stop reason: HOSPADM

## 2022-01-17 RX ORDER — LEVALBUTEROL 1.25 MG/.5ML
1.25 SOLUTION, CONCENTRATE RESPIRATORY (INHALATION)
Status: DISCONTINUED | OUTPATIENT
Start: 2022-01-17 | End: 2022-01-18 | Stop reason: HOSPADM

## 2022-01-17 RX ORDER — BENZONATATE 100 MG/1
100 CAPSULE ORAL 3 TIMES DAILY PRN
Status: DISCONTINUED | OUTPATIENT
Start: 2022-01-17 | End: 2022-01-18 | Stop reason: HOSPADM

## 2022-01-17 RX ORDER — SODIUM CHLORIDE FOR INHALATION 0.9 %
3 VIAL, NEBULIZER (ML) INHALATION
Status: DISCONTINUED | OUTPATIENT
Start: 2022-01-17 | End: 2022-01-18 | Stop reason: HOSPADM

## 2022-01-17 RX ORDER — SODIUM CHLORIDE 1000 MG
2 TABLET, SOLUBLE MISCELLANEOUS ONCE
Status: COMPLETED | OUTPATIENT
Start: 2022-01-17 | End: 2022-01-17

## 2022-01-17 RX ADMIN — FAMOTIDINE 20 MG: 20 TABLET ORAL at 17:20

## 2022-01-17 RX ADMIN — LEVALBUTEROL HYDROCHLORIDE 1.25 MG: 1.25 SOLUTION, CONCENTRATE RESPIRATORY (INHALATION) at 11:37

## 2022-01-17 RX ADMIN — ISODIUM CHLORIDE 3 ML: 0.03 SOLUTION RESPIRATORY (INHALATION) at 11:37

## 2022-01-17 RX ADMIN — GUAIFENESIN 600 MG: 600 TABLET, EXTENDED RELEASE ORAL at 09:46

## 2022-01-17 RX ADMIN — DEXAMETHASONE SODIUM PHOSPHATE 6 MG: 4 INJECTION, SOLUTION INTRA-ARTICULAR; INTRALESIONAL; INTRAMUSCULAR; INTRAVENOUS; SOFT TISSUE at 09:46

## 2022-01-17 RX ADMIN — APIXABAN 10 MG: 5 TABLET, FILM COATED ORAL at 09:45

## 2022-01-17 RX ADMIN — LEVALBUTEROL HYDROCHLORIDE 1.25 MG: 1.25 SOLUTION, CONCENTRATE RESPIRATORY (INHALATION) at 20:55

## 2022-01-17 RX ADMIN — LEVALBUTEROL HYDROCHLORIDE 1.25 MG: 1.25 SOLUTION, CONCENTRATE RESPIRATORY (INHALATION) at 07:29

## 2022-01-17 RX ADMIN — FAMOTIDINE 20 MG: 20 TABLET ORAL at 09:46

## 2022-01-17 RX ADMIN — GUAIFENESIN 600 MG: 600 TABLET, EXTENDED RELEASE ORAL at 22:20

## 2022-01-17 RX ADMIN — ISODIUM CHLORIDE 3 ML: 0.03 SOLUTION RESPIRATORY (INHALATION) at 07:29

## 2022-01-17 RX ADMIN — ISODIUM CHLORIDE 3 ML: 0.03 SOLUTION RESPIRATORY (INHALATION) at 20:55

## 2022-01-17 RX ADMIN — Medication 2 G: at 17:45

## 2022-01-17 RX ADMIN — APIXABAN 10 MG: 5 TABLET, FILM COATED ORAL at 17:20

## 2022-01-17 NOTE — ASSESSMENT & PLAN NOTE
POA, as evidenced by SpO2 82-85% on RA at home, confirmed by his PCP  Pulse ox 89% in ED at rest, up to 94% on 2 L in ED  Patient was hospitalized between 1/7-1/8 for COVID-19 pneumonia  Patient was treated with IV steroid and remdesivir, discharged home on Decadron p o  Reports SOB started one day prior to admission  Currently on 2 L nasal cannula with O2 sats mid 90s  CTA chest on admission showed bilateral COVID pneumonia, no PE  Follow mild pathway  Cardiac markers:  Troponin unremarkable, proBNP mildly elevated,CK normal  Inflammatory markers:   7, D-dimer 6 72 initially  CRP slight trending up today, D-dimer trending down  Lower venous Doppler positive for acute DVT in both lower extremities  Continue IV dexamethasone  Patient completed remdesivir  Received 1 dose of Rocephin doxycycline  Procalcitonin x2 negative  Continue Mucinex  Continue Xopenex, decrease to t i d  Lower venous Doppler positive for DVT, patient was on heparin drip; transitioned to oral Eliquis on 01/15, patient will need anticoagulation on discharge  Supportive care with IS, self prone  Check BMP, Mag, CBC, CRP, D-dimer in the morning  Home O2 eval tomorrow                Results from last 7 days   Lab Units 01/17/22  0550 01/16/22  0521 01/15/22  0501 01/14/22  0515 01/13/22  0439 01/12/22  1617   CRP mg/L 60 9* 52 5* 59 8* 99 7* 135 3* 165 7*   D-DIMER QUANTITATIVE ug/ml FEU  --   --  3 86* 5 25* 7 73* 6 72*      Results from last 7 days   Lab Units 01/13/22  0439 01/13/22  0026   PROCALCITONIN ng/ml 0 24 0 24

## 2022-01-17 NOTE — ASSESSMENT & PLAN NOTE
Venous Doppler showed acute DVT in both lower extremities  Received heparin infusion initially, now on oral Eliquis 10 mg p o  B i d  starting on 01/15    Will send Eliquis to pharmacy for prize Check today

## 2022-01-17 NOTE — PROGRESS NOTES
Patient is a 30 day readmission  He is currently readmitted to [facility] with acute respiratory failure with hypoxia  Patient does not meet complex care management criteria at this time

## 2022-01-17 NOTE — PROGRESS NOTES
[facility] [address]  Progress Note - Alex Vásquez 1955, 74 y o  male MRN: [id number]  Unit/Bed#: [address] [place] [address] Encounter: [id number]  Primary Care Provider: Annette Diaz MD   Date and time admitted to hospital: 1/12/2022  3:34 PM    * Acute respiratory failure with hypoxia [facility])  Assessment & Plan  POA, as evidenced by SpO2 82-85% on RA at home, confirmed by his PCP  Pulse ox 89% in ED at rest, up to 94% on 2 L in ED  Patient was hospitalized between 1/7-1/8 for COVID-19 pneumonia  Patient was treated with IV steroid and remdesivir, discharged home on Decadron p o  Reports SOB started one day prior to admission  Currently on 2 L nasal cannula with O2 sats mid 90s  CTA chest on admission showed bilateral COVID pneumonia, no PE  Follow mild pathway  Cardiac markers:  Troponin unremarkable, proBNP mildly elevated,CK normal  Inflammatory markers:   7, D-dimer 6 72 initially  CRP slight trending up today, D-dimer trending down  Lower venous Doppler positive for acute DVT in both lower extremities  Continue IV dexamethasone  Patient completed remdesivir  Received 1 dose of Rocephin doxycycline  Procalcitonin x2 negative  Continue Mucinex  Continue Xopenex, decrease to t i d  Lower venous Doppler positive for DVT, patient was on heparin drip; transitioned to oral Eliquis on 01/15, patient will need anticoagulation on discharge  Supportive care with IS, self prone  Check BMP, Mag, CBC, CRP, D-dimer in the morning  Home O2 eval tomorrow                Results from last 7 days   Lab Units 01/17/22  0550 01/16/22  0521 01/15/22  0501 01/14/22  0515 01/13/22  0439 01/12/22  1617   CRP mg/L 60 9* 52 5* 59 8* 99 7* 135 3* 165 7*   D-DIMER QUANTITATIVE ug/ml FEU  --   --  3 86* 5 25* 7 73* 6 72*      Results from last 7 days   Lab Units 01/13/22  0439 01/13/22  0026   PROCALCITONIN ng/ml 0 24 0 24        Pneumonia due to COVID-19 virus  Assessment & Plan  Bilateral pneumonia on images  Ruled out bacterial pneumonia as procalcitonin negative x2  Treatment as above    Acute deep vein thrombosis (DVT) of both lower extremities (HCC)  Assessment & Plan  Venous Doppler showed acute DVT in both lower extremities  Received heparin infusion initially, now on oral Eliquis 10 mg p o  B i d  starting on 01/15  Will send Eliquis to pharmacy for prize Check today    Leukocytosis  Assessment & Plan  WBC 14 91 on admission,  Likely due to steroids  Patient afebrile  Procalcitonin negative x2  Received 1 day of IV Rocephin and doxycycline  WBC trending up today, likely due to steroids  Repeat procalcitonin, CBC with diff in the morning  Hiccups  Assessment & Plan  Reglan prn    Hyponatremia  Assessment & Plan  Sodium 127 on admission  Likely multifocal secondary to poor oral intake and HCTZ use,? SIADH  Hold HCTZ  Urine sodium 71, urine Osmo 699, serum osmol 280, uric acid 4 5, TSH, free T4 normal  Sodium 132->130 -> 131->129  this morning  Oral intake improving  Continue fluid restriction, decrease to 1500 cc per day  Consult renal  Repeat BMP in the morning    Hypertension  Assessment & Plan  Patient is on Prinzide at home  Hold lisinopril as BP soft currently  Hold HCTZ due to hyponatremia  Goal -130s  VTE Pharmacologic Prophylaxis:   Pharmacologic: Apixaban (Eliquis)  Mechanical VTE Prophylaxis in Place: No    Patient Centered Rounds: I have performed bedside rounds with nursing staff today  Discussions with Specialists or Other Care Team Provider:  Yes    Education and Discussions with Family / Patient:  Yes    Time Spent for Care: 20 minutes  More than 50% of total time spent on counseling and coordination of care as described above      Current Length of Stay: 5 day(s)    Current Patient Status: Inpatient   Certification Statement: The patient will continue to require additional inpatient hospital stay due to COVID-19 pneumonia    Discharge Plan:  Home in 24 to 48 hours if continues to improve    Code Status: Level 1 - Full Code      Subjective:   Patient reports feeling better  Denies SOB at rest   Reports dry cough  Denies chest pain, headache, dizziness, nausea vomiting, diarrhea, constipation, fever, chills, denies leg pain  Objective:     Vitals:   Temp (24hrs), Av 7 °F (36 5 °C), Min:97 5 °F (36 4 °C), Max:97 8 °F (36 6 °C)    Temp:  [97 5 °F (36 4 °C)-97 8 °F (36 6 °C)] 97 7 °F (36 5 °C)  HR:  [] 90  Resp:  [18-20] 19  BP: (111-131)/(66-74) 130/72  SpO2:  [93 %-98 %] 95 %  Body mass index is 25 kg/m²  Input and Output Summary (last 24 hours): Intake/Output Summary (Last 24 hours) at 2022 1228  Last data filed at 2022 1001  Gross per 24 hour   Intake 250 ml   Output 300 ml   Net -50 ml       Physical Exam:     Physical Exam  Vitals and nursing note reviewed  Constitutional:       Appearance: He is well-developed  HENT:      Head: Normocephalic and atraumatic  Neck:      Thyroid: No thyromegaly  Vascular: No JVD  Trachea: No tracheal deviation  Cardiovascular:      Rate and Rhythm: Regular rhythm  Tachycardia present  Comments: Heart rate low 100s on monitor  Pulmonary:      Effort: Pulmonary effort is normal  No respiratory distress  Breath sounds: Normal breath sounds  No wheezing or rales  Comments: On oxygen 2 L, satting mid 90s  Abdominal:      General: There is no distension  Palpations: Abdomen is soft  Tenderness: There is no abdominal tenderness  There is no guarding  Musculoskeletal:         General: No swelling or deformity  Cervical back: Neck supple  Right lower leg: No edema  Left lower leg: No edema  Skin:     General: Skin is warm and dry  Neurological:      General: No focal deficit present  Mental Status: He is alert and oriented to person, place, and time     Psychiatric:         Mood and Affect: Mood normal          Judgment: Judgment normal  Additional Data:     Labs:    Results from last 7 days   Lab Units 01/17/22  0550 01/13/22  0439 01/12/22  1617   WBC Thousand/uL 17 27*   < > 15 59*   HEMOGLOBIN g/dL 12 2   < > 11 8*   HEMATOCRIT % 38 1   < > 35 5*   PLATELETS Thousands/uL 473*   < > 466*   LYMPHO PCT %  --   --  2*   MONO PCT %  --   --  8   EOS PCT %  --   --  0    < > = values in this interval not displayed  Results from last 7 days   Lab Units 01/17/22  0550   POTASSIUM mmol/L 5 4*   CHLORIDE mmol/L 98*   CO2 mmol/L 19*   BUN mg/dL 22   CREATININE mg/dL 0 96   CALCIUM mg/dL 8 4   ALK PHOS U/L 87   ALT U/L 35   AST U/L 29     Results from last 7 days   Lab Units 01/12/22  1617   INR  1 06       * I Have Reviewed All Lab Data Listed Above  * Additional P[facility]inent Lab Tests Reviewed: [facility]    Imaging:    Imaging Reports Reviewed Today Include:  Chest x-ray  Imaging Personally Reviewed by Myself Includes:  Chest Xray    Recent Cultures (last 7 days):           Last 24 Hours Medication List:   Current Facility-Administered Medications   Medication Dose Route Frequency Provider Last Rate    acetaminophen  650 mg Oral Q6H PRN SHAHLA Segura      albuterol  2 puff Inhalation Q6H PRN SHAHLA Segura      apixaban  10 mg Oral BID AnastasiyaSHAHLA Marcos      benzonatate  100 mg Oral TID PRN SHAHLA Cruz      dexamethasone  6 mg Intravenous Q24H SHAHLA Segura      famotidine  20 mg Oral BID AbebaiSHAHLA Mora      guaiFENesin  600 mg Oral Q12H [address] CuiyiSHAHLA Colindres      levalbuterol  1 25 mg Nebulization TID SHAHLA Segura      And    sodium chloride  3 mL Nebulization TID SHAHLA Segura      metoclopramide  5 mg Intravenous Q6H PRN SHAHLA Segura          Today, Patient Was Seen By: SHAHLA Cruz    ** Please Note: Dragon 360 Dictation voice to text software may have been used in the creation of this document   **

## 2022-01-17 NOTE — ASSESSMENT & PLAN NOTE
Sodium 127 on admission  Likely multifocal secondary to poor oral intake and HCTZ use,? SIADH  Hold HCTZ  Urine sodium 71, urine Osmo 699, serum osmol 280, uric acid 4 5, TSH, free T4 normal  Sodium 132->130 -> 131->129  this morning     Oral intake improving  Continue fluid restriction, decrease to 1500 cc per day  Consult renal  Repeat BMP in the morning

## 2022-01-17 NOTE — PROGRESS NOTES
Pastoral Care Progress Note    2022  Patient: Yamile Pineda : 1955  Admission Date & Time: 2022 1534  MRN: [id number] [facility]: [id number]                     Chaplaincy Interventions Utilized:   Relationship Building: Cultivated a relationship of care and support  Patient and I engaged in an extended conversation about how God has guided him throughout his life, especially in choosing jobs  He is thankful to God for the life that he has lived and for his wife and three daughters  Patient says that he is feeling good and should be discharged soon, perhaps at the same time as his wife     Ritual: Provided prayer     22 1300   Clinical Encounter Type   Visited With Patient   Routine Visit Follow-up   Yazdanism Encounters   Yazdanism Needs Prayer

## 2022-01-17 NOTE — TREATMENT PLAN
Chart reviewed    Consulted for hyponatremia  Admission sodium on 01/07 was 130 and today trending down to 129  Also renal function stable at creatinine 0 9-1 1  Admission creatinine was 1 5, had acute kidney injury on admission which improved  Currently with COVID-19 pneumonia    -, started on salt tablets 1 g t i d  After 2 g 1 dose    Continue fluid restriction

## 2022-01-17 NOTE — PLAN OF CARE
Problem: PAIN - ADULT  Goal: Verbalizes/displays adequate comfort level or baseline comfort level  Description: Interventions:  - Encourage patient to monitor pain and request assistance  - Assess pain using appropriate pain scale  - Administer analgesics based on type and severity of pain and evaluate response  - Implement non-pharmacological measures as appropriate and evaluate response  - Consider cultural and social influences on pain and pain management  - Notify physician/advanced practitioner if interventions unsuccessful or patient reports new pain  Outcome: Progressing     Problem: INFECTION - ADULT  Goal: Absence or prevention of progression during hospitalization  Description: INTERVENTIONS:  - Assess and monitor for signs and symptoms of infection  - Monitor lab/diagnostic results  - Monitor all insertion sites, i e  indwelling lines, tubes, and drains  - Monitor endotracheal if appropriate and nasal secretions for changes in amount and color  - [place] appropriate cooling/warming therapies per order  - Administer medications as ordered  - Instruct and encourage patient and family to use good hand hygiene technique  - Identify and instruct in appropriate isolation precautions for identified infection/condition  Outcome: Progressing     Problem: SAFETY ADULT  Goal: Patient will remain free of falls  Description: INTERVENTIONS:  - Educate patient/family on patient safety including physical limitations  - Instruct patient to call for assistance with activity   - Consult OT/PT to assist with strengthening/mobility   - Keep Call bell within reach  - Keep bed low and locked with side rails adjusted as appropriate  - Keep care items and personal belongings within reach  - Initiate and maintain comfort rounds  - Make Fall Risk Sign visible to staff  - Offer Toileting every 3 Hours, in advance of need  - Initiate/Maintain alarm  - Obtain necessary fall risk management equipment:   - Apply yellow socks and bracelet for high fall risk patients  - Consider moving patient to room near nurses station  Outcome: Progressing  Goal: Maintain or return to baseline ADL function  Description: INTERVENTIONS:  -  Assess patient's ability to carry out ADLs; assess patient's baseline for ADL function and identify physical deficits which impact ability to perform ADLs (bathing, care of mouth/teeth, toileting, grooming, dressing, etc )  - Assess/evaluate cause of self-care deficits   - Assess range of motion  - Assess patient's mobility; develop plan if impaired  - Assess patient's need for assistive devices and provide as appropriate  - Encourage maximum independence but intervene and supervise when necessary  - Involve family in performance of ADLs  - Assess for home care needs following discharge   - Consider OT consult to assist with ADL evaluation and planning for discharge  - Provide patient education as appropriate  Outcome: Progressing  Goal: Maintains/Returns to pre admission functional level  Description: INTERVENTIONS:  - Perform BMAT or MOVE assessment daily    - Set and communicate daily mobility goal to care team and patient/family/caregiver  - Collaborate with rehabilitation services on mobility goals if consulted  - Perform Range of Motion 2 times a day  - Reposition patient every 3 hours    - Dangle patient 2 times a day  - Stand patient 3 times a day  - Ambulate patient 2 times a day  - Out of bed to chair 3times a day   - Out of bed for meals 3 times a day  - Out of bed for toileting  - Record patient progress and toleration of activity level   Outcome: Progressing     Problem: DISCHARGE PLANNING  Goal: Discharge to home or other facility with appropriate resources  Description: INTERVENTIONS:  - Identify barriers to discharge w/patient and caregiver  - Arrange for needed discharge resources and transportation as appropriate  - Identify discharge learning needs (meds, wound care, etc )  - Arrange for interpretive services to assist at discharge as needed  - Refer to Case Management Department for coordinating discharge planning if the patient needs post-hospital services based on physician/advanced practitioner order or complex needs related to functional status, cognitive ability, or social support system  Outcome: Progressing     Problem: Knowledge Deficit  Goal: Patient/family/caregiver demonstrates understanding of disease process, treatment plan, medications, and discharge instructions  Description: Complete learning assessment and assess knowledge base    Interventions:  - Provide teaching at level of understanding  - Provide teaching via preferred learning methods  Outcome: Progressing     Problem: RESPIRATORY - ADULT  Goal: Achieves optimal ventilation and oxygenation  Description: INTERVENTIONS:  - Assess for changes in respiratory status  - Assess for changes in mentation and behavior  - Position to facilitate oxygenation and minimize respiratory effort  - Oxygen administered by appropriate delivery if ordered  - Initiate smoking cessation education as indicated  - Encourage broncho-pulmonary hygiene including cough, deep breathe, Incentive Spirometry  - Assess the need for suctioning and aspirate as needed  - Assess and instruct to report SOB or any respiratory difficulty  - Respiratory Therapy support as indicated  Outcome: Progressing     Problem: MOBILITY - ADULT  Goal: Maintain or return to baseline ADL function  Description: INTERVENTIONS:  -  Assess patient's ability to carry out ADLs; assess patient's baseline for ADL function and identify physical deficits which impact ability to perform ADLs (bathing, care of mouth/teeth, toileting, grooming, dressing, etc )  - Assess/evaluate cause of self-care deficits   - Assess range of motion  - Assess patient's mobility; develop plan if impaired  - Assess patient's need for assistive devices and provide as appropriate  - Encourage maximum independence but intervene and supervise when necessary  - Involve family in performance of ADLs  - Assess for home care needs following discharge   - Consider OT consult to assist with ADL evaluation and planning for discharge  - Provide patient education as appropriate  Outcome: Progressing  Goal: Maintains/Returns to pre admission functional level  Description: INTERVENTIONS:  - Perform BMAT or MOVE assessment daily    - Set and communicate daily mobility goal to care team and patient/family/caregiver  - Collaborate with rehabilitation services on mobility goals if consulted  - Perform Range of Motion 2 times a day  - Reposition patient every 3 hours    - Dangle patient 2 times a day  - Stand patient 3 times a day  - Ambulate patient 2 times a day  - Out of bed to chair 3 times a day   - Out of bed for meals 3 times a day  - Out of bed for toileting  - Record patient progress and toleration of activity level   Outcome: Progressing     Problem: Potential for Falls  Goal: Patient will remain free of falls  Description: INTERVENTIONS:  - Educate patient/family on patient safety including physical limitations  - Instruct patient to call for assistance with activity   - Consult OT/PT to assist with strengthening/mobility   - Keep Call bell within reach  - Keep bed low and locked with side rails adjusted as appropriate  - Keep care items and personal belongings within reach  - Initiate and maintain comfort rounds  - Make Fall Risk Sign visible to staff  - Offer Toileting every 3 Hours, in advance of need  - Initiate/Maintain alarm  - Obtain necessary fall risk management equipment:   - Apply yellow socks and bracelet for high fall risk patients  - Consider moving patient to room near nurses station  Outcome: Progressing

## 2022-01-17 NOTE — ASSESSMENT & PLAN NOTE
WBC 14 91 on admission,  Likely due to steroids  Patient afebrile  Procalcitonin negative x2  Received 1 day of IV Rocephin and doxycycline  WBC trending up today, likely due to steroids  Repeat procalcitonin, CBC with diff in the morning

## 2022-01-18 ENCOUNTER — TELEPHONE (OUTPATIENT)
Dept: NEPHROLOGY | Facility: CLINIC | Age: 67
End: 2022-01-18

## 2022-01-18 VITALS
DIASTOLIC BLOOD PRESSURE: 67 MMHG | WEIGHT: 200 LBS | HEART RATE: 108 BPM | RESPIRATION RATE: 19 BRPM | SYSTOLIC BLOOD PRESSURE: 113 MMHG | TEMPERATURE: 97.8 F | OXYGEN SATURATION: 94 % | HEIGHT: 75 IN | BODY MASS INDEX: 24.87 KG/M2

## 2022-01-18 DIAGNOSIS — E87.1 HYPONATREMIA: Primary | ICD-10-CM

## 2022-01-18 LAB
ANION GAP SERPL CALCULATED.3IONS-SCNC: 11 MMOL/L (ref 4–13)
ANION GAP SERPL CALCULATED.3IONS-SCNC: 8 MMOL/L (ref 4–13)
BASOPHILS # BLD MANUAL: 0 THOUSAND/UL (ref 0–0.1)
BASOPHILS NFR MAR MANUAL: 0 % (ref 0–1)
BUN SERPL-MCNC: 23 MG/DL (ref 5–25)
BUN SERPL-MCNC: 23 MG/DL (ref 5–25)
BURR CELLS BLD QL SMEAR: PRESENT
CALCIUM SERPL-MCNC: 8.6 MG/DL (ref 8.3–10.1)
CALCIUM SERPL-MCNC: 8.7 MG/DL (ref 8.3–10.1)
CHLORIDE SERPL-SCNC: 98 MMOL/L (ref 100–108)
CHLORIDE SERPL-SCNC: 99 MMOL/L (ref 100–108)
CO2 SERPL-SCNC: 24 MMOL/L (ref 21–32)
CO2 SERPL-SCNC: 26 MMOL/L (ref 21–32)
CREAT SERPL-MCNC: 1.08 MG/DL (ref 0.6–1.3)
CREAT SERPL-MCNC: 1.13 MG/DL (ref 0.6–1.3)
CRP SERPL QL: 52.9 MG/L
D DIMER PPP FEU-MCNC: 5.7 UG/ML FEU
EOSINOPHIL # BLD MANUAL: 0 THOUSAND/UL (ref 0–0.4)
EOSINOPHIL NFR BLD MANUAL: 0 % (ref 0–6)
ERYTHROCYTE [DISTWIDTH] IN BLOOD BY AUTOMATED COUNT: 14.3 % (ref 11.6–15.1)
GFR SERPL CREATININE-BSD FRML MDRD: 66 ML/MIN/1.73SQ M
GFR SERPL CREATININE-BSD FRML MDRD: 70 ML/MIN/1.73SQ M
GLUCOSE SERPL-MCNC: 117 MG/DL (ref 65–140)
GLUCOSE SERPL-MCNC: 128 MG/DL (ref 65–140)
HCT VFR BLD AUTO: 36.5 % (ref 36.5–49.3)
HGB BLD-MCNC: 11.9 G/DL (ref 12–17)
LYMPHOCYTES # BLD AUTO: 1.46 THOUSAND/UL (ref 0.6–4.47)
LYMPHOCYTES # BLD AUTO: 9 % (ref 14–44)
MAGNESIUM SERPL-MCNC: 2.1 MG/DL (ref 1.6–2.6)
MCH RBC QN AUTO: 28.7 PG (ref 26.8–34.3)
MCHC RBC AUTO-ENTMCNC: 32.6 G/DL (ref 31.4–37.4)
MCV RBC AUTO: 88 FL (ref 82–98)
MONOCYTES # BLD AUTO: 0.65 THOUSAND/UL (ref 0–1.22)
MONOCYTES NFR BLD: 4 % (ref 4–12)
NEUTROPHILS # BLD MANUAL: 13.91 THOUSAND/UL (ref 1.85–7.62)
NEUTS BAND NFR BLD MANUAL: 9 % (ref 0–8)
NEUTS SEG NFR BLD AUTO: 77 % (ref 43–75)
PLATELET # BLD AUTO: 482 THOUSANDS/UL (ref 149–390)
PLATELET BLD QL SMEAR: ABNORMAL
PMV BLD AUTO: 8.6 FL (ref 8.9–12.7)
POTASSIUM SERPL-SCNC: 4.7 MMOL/L (ref 3.5–5.3)
POTASSIUM SERPL-SCNC: 5 MMOL/L (ref 3.5–5.3)
PROCALCITONIN SERPL-MCNC: 0.27 NG/ML
RBC # BLD AUTO: 4.15 MILLION/UL (ref 3.88–5.62)
RBC MORPH BLD: PRESENT
SODIUM SERPL-SCNC: 133 MMOL/L (ref 136–145)
SODIUM SERPL-SCNC: 133 MMOL/L (ref 136–145)
VARIANT LYMPHS # BLD AUTO: 1 %
WBC # BLD AUTO: 16.17 THOUSAND/UL (ref 4.31–10.16)

## 2022-01-18 PROCEDURE — 83735 ASSAY OF MAGNESIUM: CPT | Performed by: NURSE PRACTITIONER

## 2022-01-18 PROCEDURE — 94640 AIRWAY INHALATION TREATMENT: CPT

## 2022-01-18 PROCEDURE — 94760 N-INVAS EAR/PLS OXIMETRY 1: CPT

## 2022-01-18 PROCEDURE — 86140 C-REACTIVE PROTEIN: CPT | Performed by: NURSE PRACTITIONER

## 2022-01-18 PROCEDURE — 94761 N-INVAS EAR/PLS OXIMETRY MLT: CPT

## 2022-01-18 PROCEDURE — 85007 BL SMEAR W/DIFF WBC COUNT: CPT | Performed by: NURSE PRACTITIONER

## 2022-01-18 PROCEDURE — 99239 HOSP IP/OBS DSCHRG MGMT >30: CPT | Performed by: INTERNAL MEDICINE

## 2022-01-18 PROCEDURE — 84145 PROCALCITONIN (PCT): CPT | Performed by: NURSE PRACTITIONER

## 2022-01-18 PROCEDURE — 85027 COMPLETE CBC AUTOMATED: CPT | Performed by: NURSE PRACTITIONER

## 2022-01-18 PROCEDURE — 80048 BASIC METABOLIC PNL TOTAL CA: CPT | Performed by: NURSE PRACTITIONER

## 2022-01-18 PROCEDURE — 99223 1ST HOSP IP/OBS HIGH 75: CPT | Performed by: INTERNAL MEDICINE

## 2022-01-18 PROCEDURE — 85379 FIBRIN DEGRADATION QUANT: CPT | Performed by: NURSE PRACTITIONER

## 2022-01-18 RX ORDER — SODIUM CHLORIDE 1000 MG
1 TABLET, SOLUBLE MISCELLANEOUS
Qty: 45 TABLET | Refills: 0 | Status: SHIPPED | OUTPATIENT
Start: 2022-01-18 | End: 2022-02-15

## 2022-01-18 RX ORDER — BENZONATATE 100 MG/1
100 CAPSULE ORAL 3 TIMES DAILY PRN
Qty: 20 CAPSULE | Refills: 0 | Status: SHIPPED | OUTPATIENT
Start: 2022-01-18 | End: 2022-02-14

## 2022-01-18 RX ORDER — PREDNISONE 10 MG/1
TABLET ORAL
Qty: 18 TABLET | Refills: 0 | Status: SHIPPED | OUTPATIENT
Start: 2022-01-18 | End: 2022-02-14

## 2022-01-18 RX ADMIN — Medication 1 G: at 08:49

## 2022-01-18 RX ADMIN — FAMOTIDINE 20 MG: 20 TABLET ORAL at 08:49

## 2022-01-18 RX ADMIN — LEVALBUTEROL HYDROCHLORIDE 1.25 MG: 1.25 SOLUTION, CONCENTRATE RESPIRATORY (INHALATION) at 15:01

## 2022-01-18 RX ADMIN — DEXAMETHASONE SODIUM PHOSPHATE 6 MG: 4 INJECTION, SOLUTION INTRA-ARTICULAR; INTRALESIONAL; INTRAMUSCULAR; INTRAVENOUS; SOFT TISSUE at 08:49

## 2022-01-18 RX ADMIN — ACETAMINOPHEN 650 MG: 325 TABLET, FILM COATED ORAL at 11:57

## 2022-01-18 RX ADMIN — FAMOTIDINE 20 MG: 20 TABLET ORAL at 17:37

## 2022-01-18 RX ADMIN — APIXABAN 10 MG: 5 TABLET, FILM COATED ORAL at 08:49

## 2022-01-18 RX ADMIN — GUAIFENESIN 600 MG: 600 TABLET, EXTENDED RELEASE ORAL at 08:49

## 2022-01-18 RX ADMIN — Medication 1 G: at 15:48

## 2022-01-18 RX ADMIN — Medication 1 G: at 11:50

## 2022-01-18 RX ADMIN — APIXABAN 10 MG: 5 TABLET, FILM COATED ORAL at 17:37

## 2022-01-18 RX ADMIN — LEVALBUTEROL HYDROCHLORIDE 1.25 MG: 1.25 SOLUTION, CONCENTRATE RESPIRATORY (INHALATION) at 08:53

## 2022-01-18 RX ADMIN — ISODIUM CHLORIDE 3 ML: 0.03 SOLUTION RESPIRATORY (INHALATION) at 08:53

## 2022-01-18 RX ADMIN — ISODIUM CHLORIDE 3 ML: 0.03 SOLUTION RESPIRATORY (INHALATION) at 15:01

## 2022-01-18 NOTE — ASSESSMENT & PLAN NOTE
Patient is on Prinzide at home  Lisinopril hydrochlorothiazide have been on hold and blood pressure is acceptable  Continue to hold with outpatient follow-up with PCP

## 2022-01-18 NOTE — ASSESSMENT & PLAN NOTE
Venous Doppler showed acute DVT in both lower extremities  Received heparin infusion initially, now on oral Eliquis 10 mg p o  B i d  starting on 01/15    Patient will be discharged on Eliquis

## 2022-01-18 NOTE — PLAN OF CARE
Problem: PAIN - ADULT  Goal: Verbalizes/displays adequate comfort level or baseline comfort level  Description: Interventions:  - Encourage patient to monitor pain and request assistance  - Assess pain using appropriate pain scale  - Administer analgesics based on type and severity of pain and evaluate response  - Implement non-pharmacological measures as appropriate and evaluate response  - Consider cultural and social influences on pain and pain management  - Notify physician/advanced practitioner if interventions unsuccessful or patient reports new pain  Outcome: Progressing     Problem: INFECTION - ADULT  Goal: Absence or prevention of progression during hospitalization  Description: INTERVENTIONS:  - Assess and monitor for signs and symptoms of infection  - Monitor lab/diagnostic results  - Monitor all insertion sites, i e  indwelling lines, tubes, and drains  - Monitor endotracheal if appropriate and nasal secretions for changes in amount and color  - [place] appropriate cooling/warming therapies per order  - Administer medications as ordered  - Instruct and encourage patient and family to use good hand hygiene technique  - Identify and instruct in appropriate isolation precautions for identified infection/condition  Outcome: Progressing     Problem: SAFETY ADULT  Goal: Patient will remain free of falls  Description: INTERVENTIONS:  - Educate patient/family on patient safety including physical limitations  - Instruct patient to call for assistance with activity   - Consult OT/PT to assist with strengthening/mobility   - Keep Call bell within reach  - Keep bed low and locked with side rails adjusted as appropriate  - Keep care items and personal belongings within reach  - Initiate and maintain comfort rounds  - Make Fall Risk Sign visible to staff  - Offer Toileting every 4 Hours, in advance of need  - Initiate/Maintain bed alarm  - Obtain necessary fall risk management equipment: side rails  - Apply yellow socks and bracelet for high fall risk patients  - Consider moving patient to room near nurses station  Outcome: Progressing  Goal: Maintain or return to baseline ADL function  Description: INTERVENTIONS:  -  Assess patient's ability to carry out ADLs; assess patient's baseline for ADL function and identify physical deficits which impact ability to perform ADLs (bathing, care of mouth/teeth, toileting, grooming, dressing, etc )  - Assess/evaluate cause of self-care deficits   - Assess range of motion  - Assess patient's mobility; develop plan if impaired  - Assess patient's need for assistive devices and provide as appropriate  - Encourage maximum independence but intervene and supervise when necessary  - Involve family in performance of ADLs  - Assess for home care needs following discharge   - Consider OT consult to assist with ADL evaluation and planning for discharge  - Provide patient education as appropriate  Outcome: Progressing  Goal: Maintains/Returns to pre admission functional level  Description: INTERVENTIONS:  - Perform BMAT or MOVE assessment daily    - Set and communicate daily mobility goal to care team and patient/family/caregiver  - Collaborate with rehabilitation services on mobility goals if consulted  - Perform Range of Motion 3 times a day  - Reposition patient every 3 hours    - Dangle patient 2 times a day  - Stand patient 2 times a day  - Ambulate patient 2 times a day  - Out of bed to chair 3 times a day   - Out of bed for meals 3 times a day  - Out of bed for toileting  - Record patient progress and toleration of activity level   Outcome: Progressing     Problem: DISCHARGE PLANNING  Goal: Discharge to home or other facility with appropriate resources  Description: INTERVENTIONS:  - Identify barriers to discharge w/patient and caregiver  - Arrange for needed discharge resources and transportation as appropriate  - Identify discharge learning needs (meds, wound care, etc )  - Arrange for interpretive services to assist at discharge as needed  - Refer to Case Management Department for coordinating discharge planning if the patient needs post-hospital services based on physician/advanced practitioner order or complex needs related to functional status, cognitive ability, or social support system  Outcome: Progressing

## 2022-01-18 NOTE — RESPIRATORY THERAPY NOTE
Home Oxygen Qualifying Test       Patient name: Laina Resendez        : 1955   Date of Test:  2022  Diagnosis:      Home Oxygen Test:    **Medicare Guidelines require item(s) 1-5 on all ambulatory patients or 1 and 2 on non-ambulatory patients  1   Baseline SPO2 on Room Air at rest 92 %  2   SPO2 during exercise on Room Air 90 %  During exercise monitor SpO2  If SPO2 increases >=89% with ambulation do not add supplemental             oxygen  If <= 88% on room air add O2 via NC and titrate patient  Patient must be ambulated with O2 and titrated to > 88% with exertion  3   SPO2 on Oxygen at rest NA % NA lpm     4   SPO2 during exercise on Oxygen  NA% a liter flow of lpm     5   Exercise performed:                    []  Supplemental Home Oxygen is indicated  [x]  Client does not qualify for home oxygen        Respiratory Additional Notes-     Kandice Gonsalez, RT

## 2022-01-18 NOTE — DISCHARGE SUMMARY
[address]  Discharge- [facility] 1955, 74 y o  male MRN: [id number]  Unit/Bed#: [address] [place] [address] Encounter: [id number]  Primary Care Provider: Alis Singleton MD   Date and time admitted to hospital: 1/12/2022  3:34 PM    * Acute respiratory failure with hypoxia [facility])  Assessment & Plan  POA, as evidenced by SpO2 82-85% on RA at home, confirmed by his PCP  Pulse ox 89% in ED at rest, up to 94% on 2 L in ED  Patient was hospitalized between 1/7-1/8 for COVID-19 pneumonia  Patient was treated with IV steroid and remdesivir, discharged home on Decadron p o  Reports SOB started one day prior to admission  Patient has been on 2 liters oxygen  CTA chest on admission showed bilateral COVID pneumonia, no PE  Follow mild pathway  Cardiac markers:  Troponin unremarkable, proBNP mildly elevated,CK normal  Inflammatory markers:   -52 9 , D-dimer 6 72 -5 7  Lower venous Doppler positive for acute DVT in both lower extremities  Patient was on IV Decadron and will be transitioned to prednisone taper upon discharge  Patient completed remdesivir  Received 1 dose of Rocephin doxycycline  Procalcitonin x2 negative  Continue Mucinex  Continue Xopenex, decrease to t i d  Lower venous Doppler positive for DVT, patient was on heparin drip; transitioned to oral Eliquis on 01/15, patient will need anticoagulation on discharge  Supportive care with IS, self prone  Patient did have home O2 assessment-O2 saturation was 92% on room air and 90% with exercise              Results from last 7 days   Lab Units 01/18/22  0522 01/17/22  0550 01/16/22  0521 01/15/22  0501 01/14/22  0515 01/13/22  0439 01/12/22  1617   CRP mg/L 52 9* 60 9* 52 5* 59 8* 99 7* 135 3* 165 7*   D-DIMER QUANTITATIVE ug/ml FEU 5 70*  --   --  3 86* 5 25* 7 73* 6 72*      Results from last 7 days   Lab Units 01/18/22  0522 01/13/22  0439 01/13/22  0026   PROCALCITONIN ng/ml 0 27* 0 24 0 24 Hyponatremia  Assessment & Plan  Sodium 127 on admission  Likely multifocal secondary to poor oral intake and HCTZ use,? SIADH  Hold HCTZ  Urine sodium 71, urine Osmo 699, serum osmol 280, uric acid 4 5, TSH, free T4 normal  Sodium 132->130 -> 131->129 -133  Continue fluid restriction at 1500 milliliters per day  Patient was started on salt tablets 1 gram p o  T i d  With improved sodium level  Patient received 2 grams salt tablets and 117  Continue salt tablets with outpatient follow-up BMP in 1 week  Acute deep vein thrombosis (DVT) of both lower extremities (HCC)  Assessment & Plan  Venous Doppler showed acute DVT in both lower extremities  Received heparin infusion initially, now on oral Eliquis 10 mg p o  B i d  starting on 01/15  Patient will be discharged on Eliquis    Leukocytosis  Assessment & Plan  WBC 14 91 on admission,  Likely due to steroids  Patient afebrile  Procalcitonin negative x2  Received 1 day of IV Rocephin and doxycycline  White count trended up but again decreasing  Repeat procalcitonin level only minimally elevated  Hypertension  Assessment & Plan  Patient is on Prinzide at home  Lisinopril hydrochlorothiazide have been on hold and blood pressure is acceptable  Continue to hold with outpatient follow-up with PCP  Pneumonia due to COVID-19 virus  Assessment & Plan  Bilateral pneumonia on images  Procalcitonin level x2 was negative        Medical Problems             Resolved Problems  Date Reviewed: 1/18/2022    None              Discharging Physician / Practitioner: Stephan Hurst MD  PCP: Trina Rivera MD  Admission Date:   Admission Orders (From admission, onward)     Ordered        01/12/22 [address]  Once                      Discharge Date: 01/18/22    Consultations During Hospital Stay:  · Nephrology     Outpatient Tests Requested:  · BMP in 1 week    Follow up outpatient with PCP and Nephrology    Complications:  None    Reason for Admission:  Worsening shortness of breath    Hospital Course:   Renard Salas is a 74 y o  male patient with past medical history hypertension, COVID-19 who originally presented to the hospital on 1/12/2022 due to worsening shortness of breath  Patient was 82-85% on room air at home  Patient with hospital for acute hypoxic respiratory failure secondary to COVID-19 pneumonia  Patient completed course of remdesivir  Patient received antibiotics which were later discontinued  Patient also developed hyponatremia which improved with salt tablets  Patient received IV Decadron and be discharged on prednisone taper  Inflammatory markers were improving  Patient did not qualify for home O2  Please see above list of diagnoses and related plan for additional information  Condition at Discharge: stable    Discharge Day Visit / Exam:   Subjective:  Patient is feeling much better  Denies any shortness of breath  Occasional cough  Vitals: Blood Pressure: 113/67 (01/18/22 0852)  Pulse: (!) 108 (01/18/22 0852)  Temperature: 97 8 °F (36 6 °C) (01/18/22 0852)  Temp Source: Oral (01/18/22 0852)  Respirations: 19 (01/18/22 0852)  Height: 6' 3" (190 5 cm) (01/12/22 2220)  Weight - Scale: 90 7 kg (200 lb) (01/12/22 2220)  SpO2: 94 % (01/18/22 1501)  Exam:   Physical Exam  Constitutional:       Appearance: Normal appearance  HENT:      Head: Normocephalic and atraumatic  Eyes:      Extraocular Movements: Extraocular movements intact  Pupils: Pupils are equal, round, and reactive to light  Cardiovascular:      Rate and Rhythm: Normal rate and regular rhythm  Heart sounds: No murmur heard  No gallop  Pulmonary:      Effort: Pulmonary effort is normal       Breath sounds: Normal breath sounds  Abdominal:      General: Bowel sounds are normal       Palpations: Abdomen is soft  Tenderness: There is no abdominal tenderness  Musculoskeletal:         General: No swelling or deformity   Normal range of motion  Cervical back: Normal range of motion and neck supple  Skin:     General: Skin is warm and dry  Neurological:      General: No focal deficit present  Mental Status: He is alert  Discussion with Family: yes    Discharge instructions/Information to patient and family:   See after visit summary for information provided to patient and family  Provisions for Follow-Up Care:  See after visit summary for information related to follow-up care and any pertinent home health orders  Disposition:   Home    Planned Readmission: No     Discharge Statement:  I spent 35 minutes discharging the patient  This time was spent on the day of discharge  I had direct contact with the patient on the day of discharge  Greater than 50% of the total time was spent examining patient, answering all patient questions, arranging and discussing plan of care with patient as well as directly providing post-discharge instructions  Additional time then spent on discharge activities  Discharge Medications:  See after visit summary for reconciled discharge medications provided to patient and/or family        **Please Note: This note may have been constructed using a voice recognition system**

## 2022-01-18 NOTE — ASSESSMENT & PLAN NOTE
Sodium 127 on admission  Likely multifocal secondary to poor oral intake and HCTZ use,? SIADH  Hold HCTZ  Urine sodium 71, urine Osmo 699, serum osmol 280, uric acid 4 5, TSH, free T4 normal  Sodium 132->130 -> 131->129 -133  Continue fluid restriction at 1500 milliliters per day  Patient was started on salt tablets 1 gram p o  T i d  With improved sodium level  Patient received 2 grams salt tablets and 117  Continue salt tablets with outpatient follow-up BMP in 1 week

## 2022-01-18 NOTE — CASE MANAGEMENT
Case Management Discharge Planning Note    Patient name Cresencio Combs  Location [facility] [address] MRN [id number]  : 1955 Date 2022       Current Admission Date: 2022  Current Admission Diagnosis:Acute respiratory failure with hypoxia [facility])   Patient Active Problem List    Diagnosis Date Noted    Acute deep vein thrombosis (DVT) of both lower extremities ([facility] [address] ) 2022    Hiccups 2022    Leukocytosis 2022    Acute respiratory failure with hypoxia ([facility] ) 2022    Pneumonia due to COVID-19 virus 2022    Hypertension 2022    Hyponatremia 2022      LOS (days): 6  Geometric Mean LOS (GMLOS) (days): 5 40  Days to GMLOS:-0 4     OBJECTIVE:  Risk of Unplanned Readmission Score: 14      Current admission status: Inpatient   Preferred Pharmacy:   [facility] [address] [place] [address]  Phone: [phone] Fax: [phone]    Primary Care Provider: Chani Donahue MD    Primary Insurance: [employer]  Secondary Insurance: MEDICARE    DISCHARGE DETAILS:     CM contacted family/caregiver?: Yes    Contacts  Patient Contacts: Wayne Julee  Relationship to Patient[de-identified] Family  Contact Method: Phone  Phone Number: [phone]  Reason/Outcome: Continuity of [address] Planning    Treatment Team Recommendation: Home  Discharge Destination Plan[de-identified] Home  Transport at Discharge : Family    Patient is medically cleared to discharge home today  SW called patient to discuss  He confirmed he still needed an excuse letter for work from the MD Cleopatra Billingsley advised Attending  Call to pt's dtr Demetrius Manzanares  She confirmed she will come tonight after she gets out of work at 1900 to transport home  No other discharge needs identified at this time

## 2022-01-18 NOTE — ASSESSMENT & PLAN NOTE
POA, as evidenced by SpO2 82-85% on RA at home, confirmed by his PCP  Pulse ox 89% in ED at rest, up to 94% on 2 L in ED  Patient was hospitalized between 1/7-1/8 for COVID-19 pneumonia  Patient was treated with IV steroid and remdesivir, discharged home on Decadron p o  Reports SOB started one day prior to admission  Patient has been on 2 liters oxygen  CTA chest on admission showed bilateral COVID pneumonia, no PE  Follow mild pathway  Cardiac markers:  Troponin unremarkable, proBNP mildly elevated,CK normal  Inflammatory markers:   -52 9 , D-dimer 6 72 -5 7  Lower venous Doppler positive for acute DVT in both lower extremities  Patient was on IV Decadron and will be transitioned to prednisone taper upon discharge  Patient completed remdesivir  Received 1 dose of Rocephin doxycycline  Procalcitonin x2 negative  Continue Mucinex  Continue Xopenex, decrease to t i d  Lower venous Doppler positive for DVT, patient was on heparin drip; transitioned to oral Eliquis on 01/15, patient will need anticoagulation on discharge  Supportive care with IS, self prone  Patient did have home O2 assessment-O2 saturation was 92% on room air and 90% with exercise              Results from last 7 days   Lab Units 01/18/22  0522 01/17/22  0550 01/16/22  0521 01/15/22  0501 01/14/22  0515 01/13/22  0439 01/12/22  1617   CRP mg/L 52 9* 60 9* 52 5* 59 8* 99 7* 135 3* 165 7*   D-DIMER QUANTITATIVE ug/ml FEU 5 70*  --   --  3 86* 5 25* 7 73* 6 72*      Results from last 7 days   Lab Units 01/18/22  0522 01/13/22  0439 01/13/22  0026   PROCALCITONIN ng/ml 0 27* 0 24 0 24

## 2022-01-18 NOTE — ASSESSMENT & PLAN NOTE
WBC 14 91 on admission,  Likely due to steroids  Patient afebrile  Procalcitonin negative x2  Received 1 day of IV Rocephin and doxycycline  White count trended up but again decreasing  Repeat procalcitonin level only minimally elevated

## 2022-01-18 NOTE — TELEPHONE ENCOUNTER
----- Message from Shabana Winn MD sent at 1/18/2022  9:21 AM EST -----  Patient is possible discharge from [facility] today, was seen for hyponatremia  Please ask patient to do a BMP next week on Monday, on January 24 and again in 1 week from that date  Patient is COVID-19 positive, please arrange for follow-up with an advanced practitioner order or with me in 2-3 weeks  If scheduling at an earlier date, can schedule for tele visit

## 2022-01-18 NOTE — DISCHARGE INSTRUCTIONS
[address]    Your healthcare provider and/or public health staff have evaluated you and have determined that you do not need to remain in the hospital at this time  At this time you can be isolated at home where you will be monitored by staff from your local or state health department  You should carefully follow the prevention and isolation steps below until a healthcare provider or local or state health department says that you can return to your normal activities  Stay home except to get medical care    People who are mildly ill with COVID-19 are able to isolate at home during their illness  You should restrict activities outside your home, except for getting medical care  Do not go to work, school, or public areas  Avoid using public transportation, ride-sharing, or taxis  Separate yourself from other people and animals in your home    People: As much as possible, you should stay in a specific room and away from other people in your home  Also, you should use a separate bathroom, if available  Animals: You should restrict contact with pets and other animals while you are sick with COVID-19, just like you would around other people  Although there have not been reports of pets or other animals becoming sick with COVID-19, it is still recommended that people sick with COVID-19 limit contact with animals until more information is known about the virus  When possible, have another member of your household care for your animals while you are sick  If you are sick with COVID-19, avoid contact with your pet, including petting, snuggling, being kissed or licked, and sharing food  If you must care for your pet or be around animals while you are sick, wash your hands before and after you interact with pets and wear a facemask  See COVID-19 and Animals for more information      Call ahead before visiting your doctor    If you have a medical appointment, call the healthcare provider and tell them that you have or may have COVID-19  This will help the healthcare providers office take steps to keep other people from getting infected or exposed  Wear a facemask    You should wear a facemask when you are around other people (e g , sharing a room or vehicle) or pets and before you enter a healthcare providers office  If you are not able to wear a facemask (for example, because it causes trouble breathing), then people who live with you should not stay in the same room with you, or they should wear a facemask if they enter your room  Cover your coughs and sneezes    Cover your mouth and nose with a tissue when you cough or sneeze  Throw used tissues in a lined trash can  Immediately wash your hands with soap and water for at least 20 seconds or, if soap and water are not available, clean your hands with an alcohol-based hand  that contains at least 60% alcohol  Clean your hands often    Wash your hands often with soap and water for at least 20 seconds, especially after blowing your nose, coughing, or sneezing; going to the bathroom; and before eating or preparing food  If soap and water are not readily available, use an alcohol-based hand  with at least 60% alcohol, covering all surfaces of your hands and rubbing them together until they feel dry  Soap and water are the best option if hands are visibly dirty  Avoid touching your eyes, nose, and mouth with unwashed hands  Avoid sharing personal household items    You should not share dishes, drinking glasses, cups, eating utensils, towels, or bedding with other people or pets in your home  After using these items, they should be washed thoroughly with soap and water  Clean all high-touch surfaces everyday    High touch surfaces include counters, tabletops, doorknobs, bathroom fixtures, toilets, phones, keyboards, tablets, and bedside tables  Also, clean any surfaces that may have blood, stool, or body fluids on them   Use a household cleaning spray or wipe, according to the label instructions  Labels contain instructions for safe and effective use of the cleaning product including precautions you should take when applying the product, such as wearing gloves and making sure you have good ventilation during use of the product  Monitor your symptoms    Seek prompt medical attention if your illness is worsening (e g , difficulty breathing)  Before seeking care, call your healthcare provider and tell them that you have, or are being evaluated for, COVID-19  Put on a facemask before you enter the facility  These steps will help the healthcare providers office to keep other people in the office or waiting room from getting infected or exposed  Ask your healthcare provider to call the local or [facility] health department  Persons who are placed under active monitoring or facilitated self-monitoring should follow instructions provided by their local health department or occupational health professionals, as appropriate  If you have a medical emergency and need to call 911, notify the dispatch personnel that you have, or are being evaluated for COVID-19  If possible, put on a facemask before emergency medical services arrive      Discontinuing home isolation    Patients with confirmed COVID-19 should remain under home isolation precautions until the following conditions are met:   - They have had no fever for at least 24 hours (that is one full day of no fever without the use medicine that reduces fevers)  AND  - other symptoms have improved (for example, when their cough or shortness of breath have improved)  AND  - If had mild or moderate illness, at least 10 days have passed since their symptoms first appeared or if severe illness (needed oxygen) or immunosuppressed, at least 20 days have passed since symptoms first appeared  Patients with confirmed COVID-19 should also notify close contacts (including their workplace) and ask that they self-quarantine  Currently, close contact is defined as being within 6 feet for 15 minutes or more from the period 24 hours starting 48 hours before symptom onset to the time at which the patient went into isolation  Close contacts of patients diagnosed with COVID-19 should be instructed by the patient to self-quarantine for 14 days from the last time of their last contact with the patient  Source: [employer]          Hyponatremia   WHAT YOU NEED TO KNOW:   What is hyponatremia? Hyponatremia occurs when the amount of sodium (salt) in your blood is lower than normal  Sodium is an electrolyte (mineral) that helps your muscles, heart, and digestive system work properly  It helps control blood pressure and fluid balance  What causes hyponatremia? Hyponatremia happens when too much sodium leaves your body, or when more water than sodium stays in your blood  Any of the following conditions can lead to hyponatremia:  · A diet that is low in sodium    · Drinking too much water or receiving too much fluid through an IV    · Intense and prolonged exercise that causes excessive sweating    · Medical conditions, such as [place] disease, kidney disease, congestive heart failure, liver cirrhosis, or cancer    · Medicines, such as diuretics, antidepressants, pain medicines, or illegal drugs such as ecstasy    · Dehydration    What are the signs and symptoms of hyponatremia? You may have no signs or symptoms  Symptoms may start to appear when the amount of sodium in your blood drops too low or too fast  You may have any of the following:  · Abdominal cramps, nausea, or vomiting    · Headache, confusion, hallucinations, or trouble staying awake    · Muscle weakness or cramps     · Seizures or coma    How is hyponatremia diagnosed? Your healthcare provider will ask you about the medicines you take   He will do a physical exam to look for signs of swelling caused by fluid retention (extra water in your body)  · Blood tests  will be done to check the level of sodium in your blood  They may also be done to find the cause of your hyponatremia  · Urine sodium  is a test that checks the level of sodium in your urine  A sample of your urine is collected and is sent to a lab for tests  How is hyponatremia treated? Treatment depends on the cause of your hyponatremia and how severe it is  Healthcare providers may limit the amount of liquids you drink if you are retaining water  A salt solution may be given through an IV to increase the amount of sodium in your blood  Medicines may also be given to help get rid of extra fluid in your body  You may urinate more often while taking these medicines  When should I contact my healthcare provider? · You have muscle cramps or twitching  · You feel very weak or tired  · You have nausea or are vomiting  · You have questions or concerns about your condition or care  When should I seek immediate care or call 911? · You have a seizure  · You have an irregular heartbeat  · You have trouble breathing  · You cannot move your arms and legs  · You are confused or cannot think clearly  CARE AGREEMENT:   You have the right to help plan your care  Learn about your health condition and how it may be treated  Discuss treatment options with your healthcare providers to decide what care you want to receive  You always have the right to refuse treatment  The above information is an  only  It is not intended as medical advice for individual conditions or treatments  Talk to your doctor, nurse or pharmacist before following any medical regimen to see if it is safe and effective for you  © Copyright [employer] 2021 Information is for End User's use only and may not be sold, redistributed or otherwise used for commercial purposes   All illustrations and images included in CareNotes® are the copyrighted property of A D A M , Inc  or [address] [place], [place] [address]

## 2022-01-18 NOTE — PLAN OF CARE
Problem: PAIN - ADULT  Goal: Verbalizes/displays adequate comfort level or baseline comfort level  Description: Interventions:  - Encourage patient to monitor pain and request assistance  - Assess pain using appropriate pain scale  - Administer analgesics based on type and severity of pain and evaluate response  - Implement non-pharmacological measures as appropriate and evaluate response  - Consider cultural and social influences on pain and pain management  - Notify physician/advanced practitioner if interventions unsuccessful or patient reports new pain  Outcome: Progressing     Problem: INFECTION - ADULT  Goal: Absence or prevention of progression during hospitalization  Description: INTERVENTIONS:  - Assess and monitor for signs and symptoms of infection  - Monitor lab/diagnostic results  - Monitor all insertion sites, i e  indwelling lines, tubes, and drains  - Monitor endotracheal if appropriate and nasal secretions for changes in amount and color  - [place] appropriate cooling/warming therapies per order  - Administer medications as ordered  - Instruct and encourage patient and family to use good hand hygiene technique  - Identify and instruct in appropriate isolation precautions for identified infection/condition  Outcome: Progressing     Problem: SAFETY ADULT  Goal: Patient will remain free of falls  Description: INTERVENTIONS:  - Educate patient/family on patient safety including physical limitations  - Instruct patient to call for assistance with activity   - Consult OT/PT to assist with strengthening/mobility   - Keep Call bell within reach  - Keep bed low and locked with side rails adjusted as appropriate  - Keep care items and personal belongings within reach  - Initiate and maintain comfort rounds  - Make Fall Risk Sign visible to staff  - Offer Toileting every 3 Hours, in advance of need  - Initiate/Maintain alarm  - Obtain necessary fall risk management equipment:   - Apply yellow socks and bracelet for high fall risk patients  - Consider moving patient to room near nurses station  Outcome: Progressing  Goal: Maintain or return to baseline ADL function  Description: INTERVENTIONS:  -  Assess patient's ability to carry out ADLs; assess patient's baseline for ADL function and identify physical deficits which impact ability to perform ADLs (bathing, care of mouth/teeth, toileting, grooming, dressing, etc )  - Assess/evaluate cause of self-care deficits   - Assess range of motion  - Assess patient's mobility; develop plan if impaired  - Assess patient's need for assistive devices and provide as appropriate  - Encourage maximum independence but intervene and supervise when necessary  - Involve family in performance of ADLs  - Assess for home care needs following discharge   - Consider OT consult to assist with ADL evaluation and planning for discharge  - Provide patient education as appropriate  Outcome: Progressing  Goal: Maintains/Returns to pre admission functional level  Description: INTERVENTIONS:  - Perform BMAT or MOVE assessment daily    - Set and communicate daily mobility goal to care team and patient/family/caregiver  - Collaborate with rehabilitation services on mobility goals if consulted  - Perform Range of Motion 2 times a day  - Reposition patient every 3 hours    - Dangle patient 2 times a day  - Stand patient 3 times a day  - Ambulate patient 2 times a day  - Out of bed to chair 33 times a day   - Out of bed for meals 3 times a day  - Out of bed for toileting  - Record patient progress and toleration of activity level   Outcome: Progressing     Problem: DISCHARGE PLANNING  Goal: Discharge to home or other facility with appropriate resources  Description: INTERVENTIONS:  - Identify barriers to discharge w/patient and caregiver  - Arrange for needed discharge resources and transportation as appropriate  - Identify discharge learning needs (meds, wound care, etc )  - Arrange for interpretive services to assist at discharge as needed  - Refer to Case Management Department for coordinating discharge planning if the patient needs post-hospital services based on physician/advanced practitioner order or complex needs related to functional status, cognitive ability, or social support system  Outcome: Progressing     Problem: Knowledge Deficit  Goal: Patient/family/caregiver demonstrates understanding of disease process, treatment plan, medications, and discharge instructions  Description: Complete learning assessment and assess knowledge base    Interventions:  - Provide teaching at level of understanding  - Provide teaching via preferred learning methods  Outcome: Progressing     Problem: RESPIRATORY - ADULT  Goal: Achieves optimal ventilation and oxygenation  Description: INTERVENTIONS:  - Assess for changes in respiratory status  - Assess for changes in mentation and behavior  - Position to facilitate oxygenation and minimize respiratory effort  - Oxygen administered by appropriate delivery if ordered  - Initiate smoking cessation education as indicated  - Encourage broncho-pulmonary hygiene including cough, deep breathe, Incentive Spirometry  - Assess the need for suctioning and aspirate as needed  - Assess and instruct to report SOB or any respiratory difficulty  - Respiratory Therapy support as indicated  Outcome: Progressing     Problem: MOBILITY - ADULT  Goal: Maintain or return to baseline ADL function  Description: INTERVENTIONS:  -  Assess patient's ability to carry out ADLs; assess patient's baseline for ADL function and identify physical deficits which impact ability to perform ADLs (bathing, care of mouth/teeth, toileting, grooming, dressing, etc )  - Assess/evaluate cause of self-care deficits   - Assess range of motion  - Assess patient's mobility; develop plan if impaired  - Assess patient's need for assistive devices and provide as appropriate  - Encourage maximum independence but intervene and supervise when necessary  - Involve family in performance of ADLs  - Assess for home care needs following discharge   - Consider OT consult to assist with ADL evaluation and planning for discharge  - Provide patient education as appropriate  Outcome: Progressing  Goal: Maintains/Returns to pre admission functional level  Description: INTERVENTIONS:  - Perform BMAT or MOVE assessment daily    - Set and communicate daily mobility goal to care team and patient/family/caregiver  - Collaborate with rehabilitation services on mobility goals if consulted  - Perform Range of Motion 2 times a day  - Reposition patient every 3 hours    - Dangle patient 2 times a day  - Stand patient 3 times a day  - Ambulate patient 2 times a day  - Out of bed to chair 3 times a day   - Out of bed for meals 3 times a day  - Out of bed for toileting  - Record patient progress and toleration of activity level   Outcome: Progressing     Problem: Potential for Falls  Goal: Patient will remain free of falls  Description: INTERVENTIONS:  - Educate patient/family on patient safety including physical limitations  - Instruct patient to call for assistance with activity   - Consult OT/PT to assist with strengthening/mobility   - Keep Call bell within reach  - Keep bed low and locked with side rails adjusted as appropriate  - Keep care items and personal belongings within reach  - Initiate and maintain comfort rounds  - Make Fall Risk Sign visible to staff  - Offer Toileting every  Hours, in advance of need  - Initiate/Maintain alarm  - Obtain necessary fall risk management equipment:   - Apply yellow socks and bracelet for high fall risk patients  - Consider moving patient to room near nurses station  Outcome: Progressing

## 2022-01-18 NOTE — CONSULTS
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  Prior to admission was also on lisinopril HCTZ, HCTZ could also be contributing but hyponatremia persisted despite stopping HCTZ on admission indicating component of SIADH from COVID pneumonia   [facility] Course:  Received 2 g salt tablets on 01/17 and was started on salt tablets 1 g t i d , sodium level improving today to 133 mEq/liter  -Plan:    Sodium level already improving with salt tablets, continue salt tablets 1 g t i d  If plan for discharge today, continue fluid restriction 1 5 L per day, continue to hold HCTZ on discharge   Recommend repeat BMP next week on Monday and will arrange for outpatient follow-up, office informed to schedule outpatient follow-up and BMP in 1 week as well as 2 weeks  Recent Acute kidney injury   -likely prerenal   During recent hospital admission on January 7 creatinine was elevated to 1 5 but improved next day to 1 1   -renal function currently stable at creatinine 1 0-1 1 mg/dL, continue to monitor avoid nephrotoxins  Primary hypertension  -blood pressure is stable and occasionally on lower side, continue to hold lisinopril HCTZ at this time   -if blood pressure elevated to more than 651 systolic in future would consider low-dose of lisinopril but avoid HCTZ due to concern for hyponatremia  COVID-19 pneumonia/acute hypoxic respiratory failure/acute DVT of both lower extremities being treated with oral Eliquis:  Continue management per primary team      Discussed with primary team Dr Leola Jang    Informed outpatient office to schedule follow-up and for repeat blood work  Stable for outpatient care from Nephrology side      HISTORY OF PRESENT ILLNESS:  Requesting Physician: Stephanie Lobato MD  Reason for Consult:  Hyponatremia    Parag Seth is a 74 y o  male with history of hypertension who was admitted to [facility] after presenting with complain of shortness of breath for 1 day  Patient was recently admitted from 01/07 to 1/8, was found to be COVID positive and was discharged home  After admission was found to be saturating at 89% in ED at rest   CTA on admission suggestive of bilateral COVID pneumonia, no PE  Lower extremity Doppler positive for acute DVT in both lower extremities  He was treated with remdesivir and steroids as well as received 1 dose of Rocephin/doxycycline  Creatinine on admission was 1 5 but improved since admission  Sodium level has been low  Was 127 on admission and was suspected to be due to poor oral intake as well as HCTZ use and possible SIADH  HCTZ on hold since admission  Was placed on fluid restriction  Sodium level improved to 132 during the hospital stay but again dropped to 129 on 01/17 when nephrology was consulted  PAST MEDICAL HISTORY:  Past Medical History:   Diagnosis Date    Hypertension        PAST SURGICAL HISTORY:  History reviewed  No pertinent surgical history  SOCIAL HISTORY:  Social History     Substance and Sexual Activity   Alcohol Use Never     Social History     Substance and Sexual Activity   Drug Use Not on file     Social History     Tobacco Use   Smoking Status Never Smoker   Smokeless Tobacco Never Used       FAMILY HISTORY:  History reviewed  No pertinent family history      ALLERGIES:  Allergies   Allergen Reactions    Sulfa Antibiotics Other (See Comments)     unknown       MEDICATIONS:    Current Facility-Administered Medications:     acetaminophen (TYLENOL) tablet 650 mg, 650 mg, Oral, Q6H PRN, SHAHLA Segura    albuterol (PROVENTIL HFA,VENTOLIN HFA) inhaler 2 puff, 2 puff, Inhalation, Q6H PRN, Cuiyin Nerissarik, CRNP    apixaban (ELIQUIS) tablet 10 mg, 10 mg, Oral, BID, Opal Raddle, CRNP, 10 mg at 01/18/22 0849    benzonatate (TESSALON PERLES) capsule 100 mg, 100 mg, Oral, TID PRN, Cuiyin Nerissarik, CRNP    dexamethasone (DECADRON) injection 6 mg, 6 mg, Intravenous, Q24H, Cuiyin Yurik, CRNP, 6 mg at 01/18/22 0849    famotidine (PEPCID) tablet 20 mg, 20 mg, Oral, BID, Cuiyin Yurik, CRNP, 20 mg at 01/18/22 0849    guaiFENesin (MUCINEX) 12 hr tablet 600 mg, 600 mg, Oral, Q12H [address], Cuiyin Yurik, CRNP, 600 mg at 01/18/22 0849    levalbuterol (XOPENEX) inhalation solution 1 25 mg, 1 25 mg, Nebulization, TID, 1 25 mg at 01/18/22 0853 **AND** sodium chloride 0 9 % inhalation solution 3 mL, 3 mL, Nebulization, TID, Cuiyin Nerissarik, CRNP, 3 mL at 01/18/22 0853    metoclopramide (REGLAN) injection 5 mg, 5 mg, Intravenous, Q6H PRN, Cuiyimeka Multanirik, CRNP    sodium chloride tablet 1 g, 1 g, Oral, TID With Meals, Austyn Gray MD, 1 g at 01/18/22 0849    REVIEW OF SYSTEMS:   Review of Systems   Constitutional: Negative for chills and fever  HENT: Negative for ear pain and sore throat  Eyes: Negative for pain and visual disturbance  Respiratory: Positive for cough and shortness of breath  Cardiovascular: Negative for chest pain and palpitations  Gastrointestinal: Negative for abdominal pain and vomiting  Genitourinary: Negative for dysuria and hematuria  Musculoskeletal: Negative for arthralgias and back pain  Skin: Negative for color change and rash  Neurological: Negative for seizures and syncope  All other systems reviewed and are negative  All the systems were reviewed and were negative except as documented on the HPI      PHYSICAL EXAM:  Current Weight: Weight - Scale: 90 7 kg (200 lb)  First Weight: Weight - Scale: 90 7 kg (200 lb)  Vitals:    01/17/22 2058 01/17/22 2236 01/18/22 0852 01/18/22 0854   BP:  107/71 113/67    BP Location:   Right arm    Pulse: 87 96 (!) 108    Resp: 19 19 19    Temp:  (!) 97 4 °F (36 3 °C) 97 8 °F (36 6 °C)    TempSrc:   Oral    SpO2: 97% 95% 95% 94%   Weight:       Height:           Intake/Output Summary (Last 24 hours) at 1/18/2022 6278  Last data filed at 1/17/2022 1001  Gross per 24 hour   Intake 250 ml   Output 300 ml   Net -50 ml     Physical Exam  Constitutional:       General: He is not in acute distress  Appearance: He is well-developed  He is ill-appearing  He is not diaphoretic  Comments: On oxygen by nasal cannula at 2 liters/minute   HENT:      Head: Normocephalic and atraumatic  Mouth/Throat:      Mouth: Mucous membranes are moist    Eyes:      General: No scleral icterus  Conjunctiva/sclera: Conjunctivae normal       Pupils: Pupils are equal, round, and reactive to light  Neck:      Thyroid: No thyromegaly  Cardiovascular:      Rate and Rhythm: Normal rate and regular rhythm  Heart sounds: Normal heart sounds  No murmur heard  No friction rub  Pulmonary:      Effort: Pulmonary effort is normal  No respiratory distress  Breath sounds: Normal breath sounds  No wheezing or rales  Abdominal:      General: Bowel sounds are normal  There is no distension  Palpations: Abdomen is soft  Tenderness: There is no abdominal tenderness  Musculoskeletal:         General: No deformity  Cervical back: Neck supple  Right lower leg: No edema  Left lower leg: No edema  Lymphadenopathy:      Cervical: No cervical adenopathy  Skin:     Coloration: Skin is not pale  Nails: There is no clubbing  Neurological:      Mental Status: He is alert and oriented to person, place, and time  He is not disoriented  Psychiatric:         Mood and Affect: Mood normal  Mood is not anxious  Affect is not inappropriate  Behavior: Behavior normal          Thought Content:  Thought content normal            Invasive Devices:        Lab Results:   Results from last 7 days   Lab Units 01/18/22  0522 01/18/22  0009 01/17/22  1743 01/17/22  1352 01/17/22  0550 01/16/22  0521 01/16/22  0521 01/15/22  0501 01/15/22  0501 01/14/22  0515 01/14/22  0515 01/13/22  1135 01/13/22  0439   WBC Thousand/uL 16 17*  --   --   --  17 27*  --  14 91*   < > 13 65*   < > 13 98*  --    < >   HEMOGLOBIN g/dL 11 9*  --   --   --  12 2  --  11 5*   < > 11 3*   < > 10 6*  --    < >   HEMATOCRIT % 36 5  --   --   --  38 1  --  36 3*   < > 34 3*   < > 33 4*  --    < >   PLATELETS Thousands/uL 482*  --   --   --  473*  --  542*   < > 486*   < > 455*  --    < >   POTASSIUM mmol/L 4 7 5 0 4 8   < > 5 4*   < > 4 7   < > 4 0   < > 4 6 4 4   < >   CHLORIDE mmol/L 98* 99* 97*   < > 98*   < > 98*   < > 101   < > 101 97*   < >   CO2 mmol/L 24 26 22   < > 19*   < > 23   < > 22   < > 19* 23   < >   BUN mg/dL 23 23 22   < > 22   < > 19   < > 17   < > 21 24   < >   CREATININE mg/dL 1 13 1 08 1 10   < > 0 96   < > 1 11   < > 0 96   < > 0 83 1 07   < >   CALCIUM mg/dL 8 6 8 7 8 7   < > 8 4   < > 8 2*   < > 8 0*   < > 7 9* 8 1*   < >   MAGNESIUM mg/dL 2 1  --   --   --   --   --   --   --   --   --  2 0 1 8  --    ALK PHOS U/L  --   --   --   --  87  --  89  --  101   < > 108  --    < >   ALT U/L  --   --   --   --  35  --  40  --  48   < > 54  --    < >   AST U/L  --   --   --   --  29  --  20  --  22   < > 37  --    < >    < > = values in this interval not displayed  Other Studies:  CTA chest on admission: IMPRESSION:        1  Findings compatible with Covid-19 pneumonia  2   No evidence of acute pulmonary embolus  Mild aneurysmal dilatation of the ascending aorta measuring 3 9 cm             Portions of the record may have been created with voice recognition software  Occasional wrong word or "sound a like" substitutions may have occurred due to the inherent limitations of voice recognition software   Read the chart carefully and recognize, using context, where substitutions have occurred  If you have any questions, please contact the dictating provider

## 2022-01-19 NOTE — UTILIZATION REVIEW
Notification of Discharge   This is a Notification of Discharge from our facility [address]  Please be advised that this patient has been discharge from our facility  Below you will find the admission and discharge date and time including the patients disposition  UTILIZATION REVIEW CONTACT:  Alec Ying  Utilization   Network Utilization Review Department  Phone: [phone] x carefully listen to the prompts  All voicemails are confidential   Email: [email or web address]     PHYSICIAN ADVISORY SERVICES:  FOR [id number] REVIEW - MEDICAL NECESSITY DENIAL  Phone: [phone]  Fax: [phone]  Email: [email or web address]     PRESENTATION DATE: 1/12/2022  3:34 PM  OBERVATION ADMISSION DATE:   INPATIENT ADMISSION DATE: 1/12/22  5:48 PM   DISCHARGE DATE: 1/18/2022  8:12 PM  DISPOSITION: Home/Self Care Home/Self Care      IMPORTANT INFORMATION:  Send all requests for admission clinical reviews, approved or denied determinations and any other requests to dedicated fax number below belonging to the campus where the patient is receiving treatment   List of dedicated fax numbers:  [address] DENIALS (Administrative/Medical Necessity) [phone]   [address] (Maternity/NICU/Pediatrics) [phone]   Fred Galdamez [phone]   130 [facility] [phone]   [address] [place] [address] [phone]   2000 [facility] [address] [place] [address],[address] [phone]   [facility]  [phone]   [address] [place] [address]  2401 [facility] 1000 [facility] [phone]

## 2022-01-20 NOTE — TELEPHONE ENCOUNTER
I left message for patient to give the office a call back to schedule a hospital follow up appointment  Patient may want to go to the [facility] office since he lives in [place]  Patient has not been established with a provider yet

## 2022-01-21 NOTE — TELEPHONE ENCOUNTER
I called the patient and he stated that he does not want to schedule a hospital follow up appointment at this time  Patient stated he is going to follow up with his primary care doctor for now and will call when he is ready to schedule a hospital follow up appointment

## 2022-01-26 NOTE — TELEPHONE ENCOUNTER
Patient called back to schedule follow up  Patient will be going for labwork this week for another provider and is scheduled for a hospital follow up on 2/14  Patient is aware it is a BMP

## 2022-01-27 ENCOUNTER — LAB (OUTPATIENT)
Dept: LAB | Facility: HOSPITAL | Age: 67
End: 2022-01-27
Attending: INTERNAL MEDICINE
Payer: COMMERCIAL

## 2022-01-27 ENCOUNTER — TELEPHONE (OUTPATIENT)
Dept: NEPHROLOGY | Facility: CLINIC | Age: 67
End: 2022-01-27

## 2022-01-27 DIAGNOSIS — E87.1 HYPONATREMIA: ICD-10-CM

## 2022-01-27 LAB
ANION GAP SERPL CALCULATED.3IONS-SCNC: 9 MMOL/L (ref 4–13)
BUN SERPL-MCNC: 9 MG/DL (ref 5–25)
CALCIUM SERPL-MCNC: 8.9 MG/DL (ref 8.3–10.1)
CHLORIDE SERPL-SCNC: 99 MMOL/L (ref 100–108)
CO2 SERPL-SCNC: 27 MMOL/L (ref 21–32)
CREAT SERPL-MCNC: 1.07 MG/DL (ref 0.6–1.3)
GFR SERPL CREATININE-BSD FRML MDRD: 70 ML/MIN/1.73SQ M
GLUCOSE P FAST SERPL-MCNC: 90 MG/DL (ref 65–99)
POTASSIUM SERPL-SCNC: 4.4 MMOL/L (ref 3.5–5.3)
SODIUM SERPL-SCNC: 135 MMOL/L (ref 136–145)

## 2022-01-27 PROCEDURE — 80048 BASIC METABOLIC PNL TOTAL CA: CPT

## 2022-01-27 PROCEDURE — 36415 COLL VENOUS BLD VENIPUNCTURE: CPT

## 2022-01-27 NOTE — TELEPHONE ENCOUNTER
Called patient and left a voicemail stating the following information:  Patients sodium level has improved to 135 with the use of salt tablets  Please continue salt tablets at current dose and please repeat BMP before the next office visit in February

## 2022-01-27 NOTE — RESULT ENCOUNTER NOTE
Please inform patient that sodium level has improved to 135 with the use of salt tablets  Continue salt tablets at current dose, please repeat BMP before the next office visit in February with [facility]

## 2022-01-27 NOTE — TELEPHONE ENCOUNTER
----- Message from Kb Comer MD sent at 1/27/2022 12:04 PM EST -----  Please inform patient that sodium level has improved to 135 with the use of salt tablets  Continue salt tablets at current dose, please repeat BMP before the next office visit in February with [facility]

## 2022-02-14 ENCOUNTER — OFFICE VISIT (OUTPATIENT)
Dept: NEPHROLOGY | Facility: CLINIC | Age: 67
End: 2022-02-14
Payer: COMMERCIAL

## 2022-02-14 ENCOUNTER — APPOINTMENT (OUTPATIENT)
Dept: LAB | Facility: CLINIC | Age: 67
End: 2022-02-14
Payer: COMMERCIAL

## 2022-02-14 VITALS
SYSTOLIC BLOOD PRESSURE: 146 MMHG | BODY MASS INDEX: 24.87 KG/M2 | HEIGHT: 75 IN | DIASTOLIC BLOOD PRESSURE: 78 MMHG | HEART RATE: 104 BPM | WEIGHT: 200 LBS

## 2022-02-14 DIAGNOSIS — E87.1 HYPONATREMIA: ICD-10-CM

## 2022-02-14 DIAGNOSIS — I10 PRIMARY HYPERTENSION: ICD-10-CM

## 2022-02-14 DIAGNOSIS — E87.1 HYPONATREMIA: Primary | ICD-10-CM

## 2022-02-14 LAB
ANION GAP SERPL CALCULATED.3IONS-SCNC: 6 MMOL/L (ref 4–13)
BUN SERPL-MCNC: 15 MG/DL (ref 5–25)
CALCIUM SERPL-MCNC: 9.7 MG/DL (ref 8.3–10.1)
CHLORIDE SERPL-SCNC: 106 MMOL/L (ref 100–108)
CO2 SERPL-SCNC: 25 MMOL/L (ref 21–32)
CREAT SERPL-MCNC: 0.92 MG/DL (ref 0.6–1.3)
GFR SERPL CREATININE-BSD FRML MDRD: 85 ML/MIN/1.73SQ M
GLUCOSE SERPL-MCNC: 97 MG/DL (ref 65–140)
POTASSIUM SERPL-SCNC: 4 MMOL/L (ref 3.5–5.3)
SODIUM SERPL-SCNC: 137 MMOL/L (ref 136–145)

## 2022-02-14 PROCEDURE — 36415 COLL VENOUS BLD VENIPUNCTURE: CPT

## 2022-02-14 PROCEDURE — 80048 BASIC METABOLIC PNL TOTAL CA: CPT

## 2022-02-14 PROCEDURE — 99214 OFFICE O/P EST MOD 30 MIN: CPT | Performed by: PHYSICIAN ASSISTANT

## 2022-02-14 RX ORDER — ERGOCALCIFEROL 1.25 MG/1
50000 CAPSULE ORAL WEEKLY
COMMUNITY

## 2022-02-14 RX ORDER — RIBOFLAVIN (VITAMIN B2) 100 MG
100 TABLET ORAL DAILY
COMMUNITY

## 2022-02-14 RX ORDER — MULTIVITAMIN
1 TABLET ORAL DAILY
COMMUNITY

## 2022-02-14 NOTE — PROGRESS NOTES
Assessment and Plan:    Mac Phalen was seen today for follow-up  Diagnoses and all orders for this visit:    Hyponatremia  -     Basic metabolic panel; Future  -     Basic metabolic panel; Future  -     Basic metabolic panel; Future  -     Basic metabolic panel; Future    Primary hypertension      Hyponatremia- Suspected etiology is due to COVID pneumonia in the setting of HCTZ/ thiazide diuretic  Work Up: Urine Na 71, Urine Osmolality 699, Serum Osmolality 280, TSH 1 8, CTA negative for malignancy  Treatment: salt tablets 1g three times a day, 1500ml/day fluid restriction  Most recent sodium level: 135 (1/27/22)  Instructions: Please go for blood work  We will attempt to wean you off your salt tablets  Consider a beta blocker for your fast heart rate  Hypertension- He is currently not on any antihypertensives  Previously, he was on lisinopril/hctz  Would avoid HCTZ/thiazide diuretics in the future due to hyponatremia  BP elevated today  I would recommend starting a beta blocker if his heart rate continues to be elevated  I will reach out to his PCP about this  Acute Kidney Injury- Resolved  Follow up in 3 months and get blood work monthly  Please call the office with any questions or concerns  Reason for Visit: Follow-up (hospital followup/hyponatremia)    HPI: Samir Mayberry is a 74 y o  male who is here for follow up after hospitalization at [address] [place] [address] with COVID pneumonia  He was diagnosed with hyponatremia and acute kidney injury which is why nephrology became involved in his care  He was discharged on 1/18/22  He states he is feeling much better now and is almost back to normal   He continues to eat less but states has an appetite now  He has SOB with exertion but this is improving greatly  He went back to work as a  for [employer] last week and tolerated the week well  He follows closely with his PCP      ROS: A complete review of systems was performed and was negative unless otherwise noted in the history of present illness  Allergies:   Sulfa antibiotics    Medications:     Current Outpatient Medications:     apixaban (Eliquis) 5 mg, Eliquis starter pack (Patient taking differently: 5 mg 2 (two) times a day Eliquis starter pack ), Disp: 74 tablet, Rfl: 0    Ascorbic Acid (vitamin C) 100 MG tablet, Take 100 mg by mouth daily, Disp: , Rfl:     ergocalciferol (VITAMIN D2) 50,000 units, Take 50,000 Units by mouth once a week, Disp: , Rfl:     Multiple Vitamin (multivitamin) tablet, Take 1 tablet by mouth daily, Disp: , Rfl:     sodium chloride 1 g tablet, Take 1 tablet (1 g total) by mouth 3 (three) times a day with meals for 15 days, Disp: 45 tablet, Rfl: 0    Past Medical History:   Diagnosis Date    Hypertension      History reviewed  No pertinent surgical history  History reviewed  No pertinent family history  reports that he has never smoked  He has never used smokeless tobacco  He reports that he does not drink alcohol  Physical Exam:   Vitals:    02/14/22 0826 02/14/22 0843   BP:  146/78   BP Location:  Left arm   Patient Position:  Sitting   Cuff Size:  Standard   Pulse:  104   Weight: 90 7 kg (200 lb)    Height: 6' 3" (1 905 m)      Body mass index is 25 kg/m²  General: NAD  Neuro: AAO  Skin: no rash  Eyes: anicteric  ENMT: mm moist  Neck: no masses  Respiratory: CTAB  Cardiovascular: RRR  Extremities: no edema   Gastrointestinal: soft nt nd    Procedure:  No results found for this or any previous visit  Lab Results   Component Value Date    CALCIUM 8 9 01/27/2022    K 4 4 01/27/2022    CO2 27 01/27/2022    CL 99 (L) 01/27/2022    BUN 9 01/27/2022    CREATININE 1 07 01/27/2022     I have personally reviewed the blood work as stated above and in my note  I have personally reviewed hospital discharge note and hospital renal note

## 2022-02-14 NOTE — PATIENT INSTRUCTIONS
Hyponatremia- Suspected etiology is due to COVID pneumonia in the setting of HCTZ/ thiazide diuretic  Work Up: Urine Na 71, Urine Osmolality 699, Serum Osmolality 280, TSH 1 8, CTA negative for malignancy  Treatment: salt tablets 1g three times a day, 1500ml/day fluid restriction  Most recent sodium level: 135 (1/27/22)  Instructions: Please go for blood work  We will attempt to wean you off your salt tablets  Consider a beta blocker for your fast heart rate  Hypertension- He is currently not on any antihypertensives  Previously, he was on lisinopril/hctz  Would avoid HCTZ/thiazide diuretics in the future due to hyponatremia  BP elevated today  I would recommend starting a beta blocker if his heart rate continues to be elevated  I will reach out to his PCP about this  Acute Kidney Injury- Resolved  Follow up in 3 months and get blood work monthly  Please call the office with any questions or concerns

## 2022-02-15 ENCOUNTER — TELEPHONE (OUTPATIENT)
Dept: NEPHROLOGY | Facility: CLINIC | Age: 67
End: 2022-02-15

## 2022-02-15 DIAGNOSIS — E87.1 HYPONATREMIA: ICD-10-CM

## 2022-02-15 RX ORDER — SODIUM CHLORIDE 1000 MG
1 TABLET, SOLUBLE MISCELLANEOUS 2 TIMES DAILY
Qty: 30 TABLET | Refills: 0
Start: 2022-02-15 | End: 2022-03-30

## 2022-02-15 NOTE — TELEPHONE ENCOUNTER
Pt advised that CR and sodium levels improved per Gino Wills  Reduced sodium chloride to 1 gm  BID  Continue fluid restriction  Lab work next month as previously ordered  ----- Message from Edna Persaud [address] [place] [address] sent at 2/15/2022  7:44 AM EST -----  Please call patient  Creatinine and sodium levels improved  Reduce salt tablets to 1g BID  Continue fluid restriction of <50oz per day  Repeat blood work already ordered for one month  Thank you

## 2022-02-16 ENCOUNTER — TELEPHONE (OUTPATIENT)
Dept: NEPHROLOGY | Facility: CLINIC | Age: 67
End: 2022-02-16

## 2022-02-16 NOTE — TELEPHONE ENCOUNTER
Talked with Dr Danny Koo and relayed Amira's recommendation regarding beta blockade         ----- Message from [facility], JOAO sent at 2/14/2022  9:27 AM EST -----  Can we reach out to patient's PCP and recommend consideration for a beta blocker if heart rate remains elevated at their office? I am unable to send a message through epic  Please also make sure they receive my note  Thank you

## 2022-03-29 LAB
BUN SERPL-MCNC: 13 MG/DL (ref 8–27)
BUN/CREAT SERPL: 13 (ref 10–24)
CALCIUM SERPL-MCNC: 9.5 MG/DL (ref 8.6–10.2)
CHLORIDE SERPL-SCNC: 103 MMOL/L (ref 96–106)
CO2 SERPL-SCNC: 22 MMOL/L (ref 20–29)
CREAT SERPL-MCNC: 0.98 MG/DL (ref 0.76–1.27)
EGFR: 84 ML/MIN/1.73
GLUCOSE SERPL-MCNC: 87 MG/DL (ref 65–99)
POTASSIUM SERPL-SCNC: 4.3 MMOL/L (ref 3.5–5.2)
SODIUM SERPL-SCNC: 142 MMOL/L (ref 134–144)

## 2022-03-30 ENCOUNTER — TELEPHONE (OUTPATIENT)
Dept: NEPHROLOGY | Facility: CLINIC | Age: 67
End: 2022-03-30

## 2022-03-30 ENCOUNTER — DOCUMENTATION (OUTPATIENT)
Dept: NEPHROLOGY | Facility: CLINIC | Age: 67
End: 2022-03-30

## 2022-04-11 ENCOUNTER — TELEPHONE (OUTPATIENT)
Dept: NEPHROLOGY | Facility: CLINIC | Age: 67
End: 2022-04-11

## 2022-04-11 NOTE — TELEPHONE ENCOUNTER
Lm for pt to schedule May f/u with Dr Ameena Valencia, Dr Erich Saint or AP in [facility]

## 2022-05-08 LAB
BUN SERPL-MCNC: 19 MG/DL (ref 8–27)
BUN/CREAT SERPL: 16 (ref 10–24)
CALCIUM SERPL-MCNC: 9.8 MG/DL (ref 8.6–10.2)
CHLORIDE SERPL-SCNC: 102 MMOL/L (ref 96–106)
CO2 SERPL-SCNC: 24 MMOL/L (ref 20–29)
CREAT SERPL-MCNC: 1.16 MG/DL (ref 0.76–1.27)
EGFR: 69 ML/MIN/1.73
GLUCOSE SERPL-MCNC: 112 MG/DL (ref 65–99)
POTASSIUM SERPL-SCNC: 4.5 MMOL/L (ref 3.5–5.2)
SODIUM SERPL-SCNC: 140 MMOL/L (ref 134–144)

## 2022-05-09 ENCOUNTER — TELEPHONE (OUTPATIENT)
Dept: NEPHROLOGY | Facility: CLINIC | Age: 67
End: 2022-05-09

## 2022-05-09 NOTE — TELEPHONE ENCOUNTER
----- Message from Clint sent at 5/9/2022  1:21 PM EDT -----  Sodium level remains normal   If he is still taking salt tablets at this time he can discontinue taking them  Continue a modest water restriction and instruct him to utilize electrolyte water such as propel if he is thirsty instead of drinking all plain water  Thank you

## 2022-05-09 NOTE — TELEPHONE ENCOUNTER
Lm for the patient advising that he is to stop his salt tablets at this time as his sodium levels are normal    Suggested to drink electrolytes water

## 2022-06-07 NOTE — TELEPHONE ENCOUNTER
Pt advised that sodium improving  Per Amira, decrease salt tablets to 1 gm  Daily       ----- Message from [place], [place] sent at 3/30/2022  1:38 PM EDT -----  Sodium level continues to improve  Please have patient decrease salt tablets to 1g daily  Will continue to monitor monthly blood work and follow for ability to stop salt tablets completely on next reading  Thanks 
Additional History: Dentist mentioned this to him three weeks ago. Pt was unaware of it. Asymptomatic.

## 2022-06-24 ENCOUNTER — OFFICE VISIT (OUTPATIENT)
Dept: NEPHROLOGY | Facility: CLINIC | Age: 67
End: 2022-06-24
Payer: COMMERCIAL

## 2022-06-24 VITALS
SYSTOLIC BLOOD PRESSURE: 142 MMHG | DIASTOLIC BLOOD PRESSURE: 90 MMHG | BODY MASS INDEX: 25.47 KG/M2 | HEIGHT: 75 IN | WEIGHT: 204.8 LBS

## 2022-06-24 DIAGNOSIS — I10 PRIMARY HYPERTENSION: ICD-10-CM

## 2022-06-24 DIAGNOSIS — E87.1 HYPONATREMIA: Primary | ICD-10-CM

## 2022-06-24 PROCEDURE — 99213 OFFICE O/P EST LOW 20 MIN: CPT | Performed by: INTERNAL MEDICINE

## 2022-06-24 NOTE — PATIENT INSTRUCTIONS
Your sodium level is normal based on May 2022 labs  Please check 1 more set of labs with being off the salt tablets  If the sodium remains normal, no need for follow up with us  Please continue to follow up with your PCP for further BP management

## 2022-06-24 NOTE — LETTER
June 24, 2022     Nithin Francisco MD  [address] [facility] [address]    Patient: Rito Allison   YOB: 1955   Date of Visit: 6/24/2022       Dear Dr Brianna Ozuna: Thank you for referring Antoniojosefina Turk to me for evaluation  Below are my notes for this consultation  If you have questions, please do not hesitate to call me  I look forward to following your patient along with you  Sincerely,        Luis Robertson MD        CC: No Recipients  Luis Robertson MD  6/24/2022  8:35 AM  Sign when Signing Visit  NEPHROLOGY OFFICE PROGRESS NOTE   Rito Allison 69 y o  male MRN: [id number]  DATE: 06/24/22  Reason for visit: Continued evaluation and management of ARIAS    ASSESSMENT & PLAN:  1  Hyponatremia:  · 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 by thiazide diuretic use  · Na has normalized - Na 140 in May 2022 (He was still on 1 gm of NaCl then)  · He is now off salt tablets  · Will repeat a CMP - if Na is normal, then no need for follow up  · Would recommend trying to avoid thiazide diuretics in the future  2  Hypertension:  · Home BP is close to goal (<130/80) without medications  · I recommend that he follow up with his PCP for this  · In the future, may use Amlodipine or Lisinopril  3  Acute kidney injury  · SCr 1 57 on admission to [place] in on 1/7/22  · Now resolved  · Renal function stable  SCr 1 16  Patient Instructions   Your sodium level is normal based on May 2022 labs  Please check 1 more set of labs with being off the salt tablets  If the sodium remains normal, no need for follow up with us  Please continue to follow up with your PCP for further BP management  SUBJECTIVE / INTERVAL HISTORY:  Rito Allison is a 69year old gentleman who I am seeing for the first time in the office  We met him during an admission to [facility] in January 2022 when he was admitted with COVID-19 pneumonia    We were consulted for hyponatremia and ARIAS  He was discharged on salt tablets and was taken off lisinopril HCT  He saw [facility] in February 2022 with a plan to wean salt tablets off  Since his last office visit, he has been taken off salt tablets and has been off of them since May 2022  There have been no recent hospitalizations or ER visits  He feels great  Home blood pressure readings have been in the mid 130s over 80s  He remains off BP meds  PMH/PSH:   1  HTN: Diagnosed about 15 years ago  No admissions for HTN urgency  He used to be Lisinopril HCT which was stopped in January 2022    2  DVT: COVID associated  3  COVID19    No DM, HLP, CAD, CHF, CVA, COPD, asthma, EDY, BPH, liver disease, malignancy  ALLERGIES:   Allergies   Allergen Reactions    Sulfa Antibiotics Other (See Comments)     unknown     REVIEW OF SYSTEMS:  Review of Systems   Constitutional: Negative for appetite change, chills, fatigue and fever  Respiratory: Negative for cough and shortness of breath  Cardiovascular: Negative for chest pain and leg swelling  Gastrointestinal: Negative for abdominal pain, diarrhea, nausea and vomiting  Genitourinary: Negative for dysuria and hematuria  Musculoskeletal: Negative for arthralgias  Allergic/Immunologic: Negative for immunocompromised state  Neurological: Negative for dizziness and light-headedness  OBJECTIVE:  Ht 6' 3" (1 905 m)   Wt 92 9 kg (204 lb 12 8 oz)   BMI 25 60 kg/m²   Current Weight:   Body mass index is 25 6 kg/m²  Physical Exam  Constitutional:       General: He is not in acute distress  Appearance: Normal appearance  He is well-developed and normal weight  He is not ill-appearing or diaphoretic  HENT:      Head: Normocephalic and atraumatic  Eyes:      General: No scleral icterus  Conjunctiva/sclera: Conjunctivae normal    Neck:      Vascular: No JVD  Cardiovascular:      Rate and Rhythm: Normal rate and regular rhythm  Heart sounds: Normal heart sounds  Pulmonary:      Effort: Pulmonary effort is normal  No respiratory distress  Breath sounds: Normal breath sounds  Abdominal:      General: Bowel sounds are normal       Palpations: Abdomen is soft  Musculoskeletal:      Cervical back: Neck supple  Right lower leg: No edema  Left lower leg: No edema  Skin:     General: Skin is warm and dry  Neurological:      Mental Status: He is alert and oriented to person, place, and time     Psychiatric:         Behavior: Behavior normal        Medications:  Current Outpatient Medications:     apixaban (Eliquis) 5 mg, Eliquis starter pack (Patient taking differently: 5 mg 2 (two) times a day Eliquis starter pack ), Disp: 74 tablet, Rfl: 0    Ascorbic Acid (vitamin C) 100 MG tablet, Take 100 mg by mouth daily, Disp: , Rfl:     ergocalciferol (VITAMIN D2) 50,000 units, Take 50,000 Units by mouth once a week, Disp: , Rfl:     Multiple Vitamin (multivitamin) tablet, Take 1 tablet by mouth daily, Disp: , Rfl:     sodium chloride 1 g tablet, Take 1 tablet (1 g total) by mouth 2 (two) times a day for 15 days (Patient taking differently: Take 1 g by mouth in the morning  ), Disp: 30 tablet, Rfl: 0    Laboratory Results:  Results for orders placed or performed in visit on 97/38/37   Basic metabolic panel   Result Value Ref Range    Glucose, Random 112 (H) 65 - 99 mg/dL    BUN 19 8 - 27 mg/dL    Creatinine 1 16 0 76 - 1 27 mg/dL    eGFR 69 >59 mL/min/1 73    SL AMB BUN/CREATININE RATIO 16 10 - 24    Sodium 140 134 - 144 mmol/L    Potassium 4 5 3 5 - 5 2 mmol/L    Chloride 102 96 - 106 mmol/L    CO2 24 20 - 29 mmol/L    CALCIUM 9 8 8 6 - 10 2 mg/dL

## 2022-06-24 NOTE — PROGRESS NOTES
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  · Na has normalized - Na 140 in May 2022 (He was still on 1 gm of NaCl then)  · He is now off salt tablets  · Will repeat a CMP - if Na is normal, then no need for follow up  · Would recommend trying to avoid thiazide diuretics in the future  2  Hypertension:  · Home BP is close to goal (<130/80) without medications  · I recommend that he follow up with his PCP for this  · In the future, may use Amlodipine or Lisinopril  3  Acute kidney injury  · SCr 1 57 on admission to [place] in on 1/7/22  · Now resolved  · Renal function stable  SCr 1 16  Patient Instructions   Your sodium level is normal based on May 2022 labs  Please check 1 more set of labs with being off the salt tablets  If the sodium remains normal, no need for follow up with us  Please continue to follow up with your PCP for further BP management  SUBJECTIVE / INTERVAL HISTORY:  Fartun Fraire is a 69year old gentleman who I am seeing for the first time in the office  We met him during an admission to [facility] in January 2022 when he was admitted with COVID-19 pneumonia  We were consulted for hyponatremia and ARIAS  He was discharged on salt tablets and was taken off lisinopril HCT  He saw [facility] in February 2022 with a plan to wean salt tablets off  Since his last office visit, he has been taken off salt tablets and has been off of them since May 2022  There have been no recent hospitalizations or ER visits  He feels great  Home blood pressure readings have been in the mid 130s over 80s  He remains off BP meds  PMH/PSH:   1  HTN: Diagnosed about 15 years ago  No admissions for HTN urgency   He used to be Lisinopril HCT which was stopped in January 2022    2  DVT: COVID associated  3  COVID19    No DM, HLP, CAD, CHF, CVA, COPD, asthma, EDY, BPH, liver disease, malignancy  ALLERGIES:   Allergies   Allergen Reactions    Sulfa Antibiotics Other (See Comments)     unknown     REVIEW OF SYSTEMS:  Review of Systems   Constitutional: Negative for appetite change, chills, fatigue and fever  Respiratory: Negative for cough and shortness of breath  Cardiovascular: Negative for chest pain and leg swelling  Gastrointestinal: Negative for abdominal pain, diarrhea, nausea and vomiting  Genitourinary: Negative for dysuria and hematuria  Musculoskeletal: Negative for arthralgias  Allergic/Immunologic: Negative for immunocompromised state  Neurological: Negative for dizziness and light-headedness  OBJECTIVE:  Ht 6' 3" (1 905 m)   Wt 92 9 kg (204 lb 12 8 oz)   BMI 25 60 kg/m²   Current Weight:   Body mass index is 25 6 kg/m²  Physical Exam  Constitutional:       General: He is not in acute distress  Appearance: Normal appearance  He is well-developed and normal weight  He is not ill-appearing or diaphoretic  HENT:      Head: Normocephalic and atraumatic  Eyes:      General: No scleral icterus  Conjunctiva/sclera: Conjunctivae normal    Neck:      Vascular: No JVD  Cardiovascular:      Rate and Rhythm: Normal rate and regular rhythm  Heart sounds: Normal heart sounds  Pulmonary:      Effort: Pulmonary effort is normal  No respiratory distress  Breath sounds: Normal breath sounds  Abdominal:      General: Bowel sounds are normal       Palpations: Abdomen is soft  Musculoskeletal:      Cervical back: Neck supple  Right lower leg: No edema  Left lower leg: No edema  Skin:     General: Skin is warm and dry  Neurological:      Mental Status: He is alert and oriented to person, place, and time     Psychiatric:         Behavior: Behavior normal        Medications:  Current Outpatient Medications:   apixaban (Eliquis) 5 mg, Eliquis starter pack (Patient taking differently: 5 mg 2 (two) times a day Eliquis starter pack ), Disp: 74 tablet, Rfl: 0    Ascorbic Acid (vitamin C) 100 MG tablet, Take 100 mg by mouth daily, Disp: , Rfl:     ergocalciferol (VITAMIN D2) 50,000 units, Take 50,000 Units by mouth once a week, Disp: , Rfl:     Multiple Vitamin (multivitamin) tablet, Take 1 tablet by mouth daily, Disp: , Rfl:     sodium chloride 1 g tablet, Take 1 tablet (1 g total) by mouth 2 (two) times a day for 15 days (Patient taking differently: Take 1 g by mouth in the morning  ), Disp: 30 tablet, Rfl: 0    Laboratory Results:  Results for orders placed or performed in visit on 60/70/07   Basic metabolic panel   Result Value Ref Range    Glucose, Random 112 (H) 65 - 99 mg/dL    BUN 19 8 - 27 mg/dL    Creatinine 1 16 0 76 - 1 27 mg/dL    eGFR 69 >59 mL/min/1 73    SL AMB BUN/CREATININE RATIO 16 10 - 24    Sodium 140 134 - 144 mmol/L    Potassium 4 5 3 5 - 5 2 mmol/L    Chloride 102 96 - 106 mmol/L    CO2 24 20 - 29 mmol/L    CALCIUM 9 8 8 6 - 10 2 mg/dL

## 2022-07-17 LAB
ALBUMIN SERPL-MCNC: 4.4 G/DL (ref 3.8–4.8)
ALBUMIN/GLOB SERPL: 1.9 {RATIO} (ref 1.2–2.2)
ALP SERPL-CCNC: 61 IU/L (ref 44–121)
ALT SERPL-CCNC: 14 IU/L (ref 0–44)
AST SERPL-CCNC: 19 IU/L (ref 0–40)
BILIRUB SERPL-MCNC: 0.5 MG/DL (ref 0–1.2)
BUN SERPL-MCNC: 15 MG/DL (ref 8–27)
BUN/CREAT SERPL: 13 (ref 10–24)
CALCIUM SERPL-MCNC: 9.5 MG/DL (ref 8.6–10.2)
CHLORIDE SERPL-SCNC: 103 MMOL/L (ref 96–106)
CO2 SERPL-SCNC: 24 MMOL/L (ref 20–29)
CREAT SERPL-MCNC: 1.17 MG/DL (ref 0.76–1.27)
EGFR: 67 ML/MIN/1.73
GLOBULIN SER-MCNC: 2.3 G/DL (ref 1.5–4.5)
GLUCOSE SERPL-MCNC: 98 MG/DL (ref 65–99)
POTASSIUM SERPL-SCNC: 4.6 MMOL/L (ref 3.5–5.2)
PROT SERPL-MCNC: 6.7 G/DL (ref 6–8.5)
SODIUM SERPL-SCNC: 140 MMOL/L (ref 134–144)

## 2022-07-21 ENCOUNTER — TELEPHONE (OUTPATIENT)
Dept: NEPHROLOGY | Facility: CLINIC | Age: 67
End: 2022-07-21

## 2022-07-21 NOTE — TELEPHONE ENCOUNTER
Message left on patient's VM that sodium level is normal at 140  No need for further followup; pt to follow with PCP per  Dr Juan Miguel Rondon         ----- Message from Priyank Davis MD sent at 7/20/2022  4:24 PM EDT -----  Please inform patient that his Na level on the latest labs is normal (140)  No need for further follow up with us  He should continue to follow up with his PCP

## 2022-10-12 PROBLEM — J12.82 PNEUMONIA DUE TO COVID-19 VIRUS: Status: RESOLVED | Noted: 2022-01-07 | Resolved: 2022-10-12

## 2022-10-12 PROBLEM — U07.1 PNEUMONIA DUE TO COVID-19 VIRUS: Status: RESOLVED | Noted: 2022-01-07 | Resolved: 2022-10-12

## 2024-02-20 NOTE — H&P
[place] 43  H&P- Elodia Fuentes 1955, 74 y o  male MRN: [id number]  Unit/Bed#: ED 09 Encounter: [id number]  Primary Care Provider: Cesar Rosado MD   Date and time admitted to hospital: 1/12/2022  3:34 PM    * Acute respiratory failure with hypoxia [facility])  Assessment & Plan  POA, as evidenced by SpO2 82-85% on RA at home, confirmed by his PCP  Pulse ox 89% in ED at rest, up to 94% on 2 L in ED  Patient was hospitalized between 1/7-1/8 for COVID-19 pneumonia  Patient was treated with IV steroid and remdesivir, discharged home on Decadron p o  Reports SOB started yesterday  Currently on 2 L, satting low to mid 90s  CTA chest today showed bilateral COVID pneumonia, no PE  Follow mild pathway  Cardiac markers:  Troponin unremarkable, proBNP mildly elevated, check CK  Inflammatory markers:   7, D-dimer 6 72  Start IV dexamethasone, remdesivir  Start Rocephin doxycycline in view of leukocytosis while pending procalcitonin  Start heparin infusion ACS protocol  Start Mucinex  Check lower venous Doppler to rule out DVT  Supportive care with IS, self prone  Check CMP, CBC, CRP, D-dimer in the morning  Check procalcitonin    Results from last 7 days   Lab Units 01/07/22  1309   SARS-COV-2  Positive*     Results from last 7 days   Lab Units 01/12/22  1617 01/08/22  0527 01/07/22  1309   CRP mg/L 165 7* 91 1*  --    D-DIMER QUANTITATIVE ug/ml FEU 6 72*  --  3 39*      Results from last 7 days   Lab Units 01/08/22  0527 01/07/22  1309   PROCALCITONIN ng/ml 0 30* 0 39*        Pneumonia due to COVID-19 virus  Assessment & Plan  Bilateral pneumonia on images  Check procalcitonin to r/o bacterial pneumonia  Treatment as above    Leukocytosis  Assessment & Plan  WBC 15 59  Likely due to steroids  Patient afebrile    Check procalcitonin  IV Rocephin and doxycycline while pending procalcitonin  Repeat CBC in the morning    Hiccups  Assessment & Plan  Reglan Reviewed wherever he goes and does a photo she will M [facility]  Chief Complaint   Patient presents with    ROBERT Regalado Medical Record Number:  [id number]  Place of Service where encounter took place:  [facility] ([facility]) [[id number]]  Code Status: unknown     HISTORY:      HPI:  Pa García  is 76 year old (1947) undergoing physical and occupational therapy. He is with past medical history hypertension, chronic bilateral hip pain, diabetes type 2, GERD, HDL,Excerpted from records  He presented  for progressively worsening generalized weakness and mechanical fall.  No LOS, did not hit head.  No associated symptoms.  No chest pain or palpitations. Presented afebrile, vitally stable, satting well on RA.  He had mild elevation of troponin that down trended on repeat otherwise CBC, CMP Pro-Julio, lactic acid, UA, chest x-ray, UA, flu/RSV/COVID were unremarkable.  Blood cultures have been no growth to date.  He was also noted to have atrial flutter with an elevated troponin per records the flutter appears to be new.  He was asymptomatic, cardiology was consulted and he had an echo that had normal LV function without wall motion abnormalities.  He was recommended he be a candidate for anticoagulation however patient refused due to history of recurrent falls.  He plans to follow-up outpatient cardiology for possible electrophysiology consult  He was recovering in the TCU and was sent to the ER for elevated creatinine  on 2/9/24 and returned to the TCU on 2/14/24 Excerpted from records   He admitted on 2/9/2024 for acute kidney injury in the setting of suspected metformin associated lactic acidosis.  He was admitted again  for acute renal failure with hyperkalemia and high anion gap metabolic acidosis. S/p HD on 2/9/24, 2/10/24, 2/12/24, 2/14/24. S/p renal biopsy, pathology read is pending. Plan is for HD following a TTS schedule for 3 months.       Today he was seen at the bedside to review  prn    Hyponatremia  Assessment & Plan  Sodium 127  Likely multifocal secondary to poor oral intake and HCTZ use  Trial of gentle IV hydration  Hold HCTZ  Check urine sodium, urine Osmo, serum osmol, uric acid, TSH, free T4  Check BMP every 6 hours    Hypertension  Assessment & Plan  Patient is on Prinzide at home  Continue lisinopril at lower dose  Hold HCTZ due to hyponatremia  BP stable      VTE Prophylaxis: Heparin Drip  / reason for no mechanical VTE prophylaxis On heparin drip   Code Status:  Full code  POLST: POLST form is not discussed and not completed at this time  Anticipated Length of Stay:  Patient will be admitted on an Inpatient basis with an anticipated length of stay of  > 2 midnights  Justification for Hospital Stay:  COVID-19 pneumonia, acute respiratory failure    Total Time for Visit, including Counseling / Coordination of Care: 45 minutes  Greater than 50% of this total time spent on direct patient counseling and coordination of care  Chief Complaint:   Worsening SOB since yesterday    History of Present Illness:    Jenny Carrizales is a 74 y o  male with PMH of hypertension, COVID-19 infection who presents with worsening SOB since yesterday  Patient reports he was admitted here and discharged on Saturday  He was doing fine until yesterday when he started with worsening SOB  Reports oxygen level was 82-85% on room air at home, confirmed by his PCP who saw the results on the picture  Patient reports dry cough at home  Denies fever, chills, chest pain, headache, dizziness, nausea vomiting diarrhea  Reports poor appetite at home  Reports fatigue and weakness  Patient unvaccinated  Reports taking dexamethasone at home as prescribed  Patient offered no other complaints  Review of Systems:    Review of Systems   Constitutional: Positive for activity change, appetite change and fatigue  Respiratory: Positive for cough and shortness of breath      All other systems reviewed vital signs, labs, and posthospitalization.  Recently noted to have hyperkalemia was given Kayexalate.  Repeat labs worsening.  Creatinine went from 2.54 to 4.08.  Continue to be hyperkalemic at 5.7 with a sodium of 131.  He was also with nausea and vomiting and was sent to the ER at that time.  He was also with a HGB 6.8 however he was 7.7 when he reached the hospital and was sent back to the TCU. Please see above.  He denied chest pain shortness of breath cough, Denied constipation or diarrhea.  He is with chronic right foot drop and wears an AFO brace.  He was on metformin which was recently discontinued due to elevated creatinine.   He does have chronic pain bilateral hips left more pronounced than the right.  Per hospital records he did receive bilateral hip injections in the hospital.  Per his report he ambulates with a cane and a four-wheel walker for longer distances.  His weights were reviewed which showed a significant weight gain in 1 week of approximately 20 pounds. He was placed on daily weights. His lung sounds were clear throughout all fields he was noted to have 1-2+ lower extremity edema and compression stockings were ordered.  He completed Levaquin on 2/15/2024 for a UTI.      ALLERGIES:Ezetimibe-simvastatin and Metformin    PAST MEDICAL HISTORY: No past medical history on file.    PAST SURGICAL HISTORY:   has a past surgical history that includes back surgery; Lumbar Laminectomy (Bilateral, 3/8/2016); and Lumbar Laminectomy (3/13/2016).    FAMILY HISTORY: family history includes Cancer in his maternal grandfather, maternal grandmother, paternal grandfather, and paternal grandmother; Dementia in his father.    SOCIAL HISTORY:  reports that he has never smoked. He has never used smokeless tobacco. He reports current alcohol use. He reports that he does not use drugs.    ROS:  Constitutional: Negative for activity change, appetite change, fatigue and fever. HX Falls  HENT: Negative for congestion.   "Right neck port currently undergoing dialysis Tuesday Thursday Saturday  Respiratory: Negative for cough, shortness of breath and wheezing.    Cardiovascular: Negative for chest pain and positive   leg swelling.   Gastrointestinal: Negative for abdominal distention, abdominal pain, constipation, diarrhea   Patient genitourinary: Negative for dysuria.   Musculoskeletal: Positive  for arthralgia. Negative for back pain. Bilateral hips   Skin: Negative for color change and wound. Per records stage 1 coccyx   Neurological: Negative for dizziness.   Psychiatric/Behavioral: Negative for agitation, behavioral problems and confusion.     Physical Exam:  Constitutional:       Appearance: Patient is well-developed.   HENT:      Head: Normocephalic.   Eyes:      Conjunctiva/sclera: Conjunctivae normal.   Neck:      Musculoskeletal: Normal range of motion.   Cardiovascular:      Rate and Rhythm: Normal rate and regular rhythm.      Heart sounds: Normal heart sounds. No murmur.   Pulmonary:      Effort: No respiratory distress.      Breath sounds: Normal breath sounds. No wheezing or rales.   Abdominal:      General: Bowel sounds are normal. There is no distension.      Palpations: Abdomen is soft.      Tenderness: There is no abdominal tenderness.   Musculoskeletal:       Normal range of motion.   1-2+ lower extremity edema   Skin:General:        Skin is warm.   Neurological:         Mental Status: Patient is alert and oriented to person, place, and time.   Psychiatric:         Behavior: Behavior normal.     Vitals:BP (!) 140/72   Pulse 73   Temp 97.5  F (36.4  C)   Resp 20   Ht 1.753 m (5' 9\")   Wt 78.2 kg (172 lb 6.4 oz)   SpO2 92%   BMI 25.46 kg/m   and Body mass index is 25.46 kg/m .    Lab/Diagnostic data:   Recent Results (from the past 240 hour(s))   Glucose by meter    Collection Time: 02/11/24  8:59 PM   Result Value Ref Range    GLUCOSE BY METER POCT 144 (H) 70 - 99 mg/dL   Glucose by meter    Collection Time: " and are negative  Past Medical and Surgical History:     Past Medical History:   Diagnosis Date    Hypertension        History reviewed  No pertinent surgical history  Meds/Allergies:    Prior to Admission medications    Medication Sig Start Date End Date Taking? Authorizing Provider   albuterol (PROVENTIL HFA,VENTOLIN HFA) 90 mcg/act inhaler Inhale 2 puffs every 6 (six) hours as needed for wheezing    Historical Provider, MD   dexamethasone (DECADRON) 6 mg tablet Take 1 tablet (6 mg total) by mouth in the morning 1/8/22   Heaven Miller DO   guaifenesin-codeine (GUAIFENESIN AC) 100-10 MG/5ML liquid Take 5 mL by mouth 3 (three) times a day as needed for cough    Historical Provider, MD   lisinopril-hydrochlorothiazide (PRINZIDE,ZESTORETIC) 10-12 5 MG per tablet Take 1 tablet by mouth daily    Historical Provider, MD PITTS have reviewed home medications with patient personally  Allergies: Allergies   Allergen Reactions    Sulfa Antibiotics Other (See Comments)     unknown       Social History:     Marital Status: /Civil Union   Occupation:  Landscaping  Patient Pre-hospital Living Situation:  Lives with family  Patient Pre-hospital Level of Mobility:  Independent  Patient Pre-hospital Diet Restrictions:  Regular  Substance Use History:   Social History     Substance and Sexual Activity   Alcohol Use Never     Social History     Tobacco Use   Smoking Status Never Smoker   Smokeless Tobacco Never Used     Social History     Substance and Sexual Activity   Drug Use Not on file       Family History:    non-contributory    Physical Exam:     Vitals:   Blood Pressure: 133/86 (01/12/22 1936)  Pulse: 90 (01/12/22 1936)  Temperature: 98 1 °F (36 7 °C) (01/12/22 1936)  Temp Source: Oral (01/12/22 1936)  Respirations: 18 (01/12/22 1936)  SpO2: 96 % (01/12/22 1936)    Physical Exam  Vitals and nursing note reviewed  Constitutional:       Appearance: He is well-developed     HENT:      Head: Normocephalic 02/12/24  2:28 AM   Result Value Ref Range    GLUCOSE BY METER POCT 120 (H) 70 - 99 mg/dL   Basic metabolic panel    Collection Time: 02/12/24  6:03 AM   Result Value Ref Range    Sodium 132 (L) 135 - 145 mmol/L    Potassium 4.4 3.4 - 5.3 mmol/L    Chloride 94 (L) 98 - 107 mmol/L    Carbon Dioxide (CO2) 26 22 - 29 mmol/L    Anion Gap 12 7 - 15 mmol/L    Urea Nitrogen 34.6 (H) 8.0 - 23.0 mg/dL    Creatinine 4.42 (H) 0.67 - 1.17 mg/dL    GFR Estimate 13 (L) >60 mL/min/1.73m2    Calcium 8.3 (L) 8.8 - 10.2 mg/dL    Glucose 115 (H) 70 - 99 mg/dL   CBC with platelets    Collection Time: 02/12/24  6:03 AM   Result Value Ref Range    WBC Count 9.6 4.0 - 11.0 10e3/uL    RBC Count 2.51 (L) 4.40 - 5.90 10e6/uL    Hemoglobin 8.1 (L) 13.3 - 17.7 g/dL    Hematocrit 23.8 (L) 40.0 - 53.0 %    MCV 95 78 - 100 fL    MCH 32.3 26.5 - 33.0 pg    MCHC 34.0 31.5 - 36.5 g/dL    RDW 13.4 10.0 - 15.0 %    Platelet Count 362 150 - 450 10e3/uL   Iron and iron binding capacity    Collection Time: 02/12/24  6:03 AM   Result Value Ref Range    Iron 50 (L) 61 - 157 ug/dL    Iron Binding Capacity 158 (L) 240 - 430 ug/dL    Iron Sat Index 32 15 - 46 %   Ferritin    Collection Time: 02/12/24  6:03 AM   Result Value Ref Range    Ferritin 113 31 - 409 ng/mL   Glucose by meter    Collection Time: 02/12/24  8:31 AM   Result Value Ref Range    GLUCOSE BY METER POCT 200 (H) 70 - 99 mg/dL   Glucose by meter    Collection Time: 02/12/24 12:55 PM   Result Value Ref Range    GLUCOSE BY METER POCT 124 (H) 70 - 99 mg/dL   Glucose by meter    Collection Time: 02/12/24  2:55 PM   Result Value Ref Range    GLUCOSE BY METER POCT 187 (H) 70 - 99 mg/dL   Glucose by meter    Collection Time: 02/12/24  4:33 PM   Result Value Ref Range    GLUCOSE BY METER POCT 181 (H) 70 - 99 mg/dL   Protein  random urine    Collection Time: 02/12/24  5:30 PM   Result Value Ref Range    Total Protein Urine mg/dL 10.8   mg/dL    Total Protein Urine mg/mg Creat 1.52 (H) 0.00 - 0.20 mg/mg  and atraumatic  Neck:      Thyroid: No thyromegaly  Vascular: No JVD  Trachea: No tracheal deviation  Cardiovascular:      Rate and Rhythm: Normal rate and regular rhythm  Heart sounds: Normal heart sounds  Pulmonary:      Effort: Pulmonary effort is normal  No respiratory distress  Breath sounds: Normal breath sounds  No wheezing or rales  Comments: On oxygen under, satting low to mid 90s  Abdominal:      General: There is no distension  Palpations: Abdomen is soft  Tenderness: There is no abdominal tenderness  There is no guarding  Musculoskeletal:         General: No swelling or deformity  Normal range of motion  Cervical back: Neck supple  Right lower leg: No edema  Left lower leg: No edema  Skin:     General: Skin is warm and dry  Neurological:      General: No focal deficit present  Mental Status: He is alert and oriented to person, place, and time  Psychiatric:         Mood and Affect: Mood normal          Judgment: Judgment normal          Additional Data:     Lab Results: I have personally reviewed pertinent reports  Results from last 7 days   Lab Units 01/12/22  1617 01/08/22  0527 01/08/22  0527   WBC Thousand/uL 15 59*   < > 5 46   HEMOGLOBIN g/dL 11 8*   < > 12 0   HEMATOCRIT % 35 5*   < > 36 6   PLATELETS Thousands/uL 466*   < > 381   NEUTROS PCT %  --   --  76*   LYMPHS PCT %  --   --  9*   LYMPHO PCT % 2*  --   --    MONOS PCT %  --   --  13*   MONO PCT % 8  --   --    EOS PCT % 0  --  0    < > = values in this interval not displayed  Results from last 7 days   Lab Units 01/12/22  1617   POTASSIUM mmol/L 4 1   CHLORIDE mmol/L 94*   CO2 mmol/L 26   BUN mg/dL 23   CREATININE mg/dL 1 14   CALCIUM mg/dL 8 3   ALK PHOS U/L 120*   ALT U/L 73   AST U/L 46*     Results from last 7 days   Lab Units 01/12/22  1617   INR  1 06       Imaging: I have personally reviewed pertinent reports        XR chest 1 view portable    Result Date: 1/7/2022  Narrative: CHEST INDICATION:   Flu symptoms    COMPARISON:  None EXAM PERFORMED/VIEWS:  XR CHEST PORTABLE FINDINGS: Cardiomediastinal silhouette appears unremarkable  Opacities in the lung bases  No pleural effusion or pneumothorax  Osseous structures appear within normal limits for patient age  Impression: Bibasilar opacities which can represent pneumonia, including COVID 19, in the appropriate clinical setting  Workstation performed: [id number]     CTA ED chest PE study    Result Date: 1/12/2022  Narrative: CTA - CHEST WITH IV CONTRAST - PULMONARY ANGIOGRAM INDICATION:   Shortness of breath  Patient has confirmed COVID-19  COMPARISON: 1/7/2022  TECHNIQUE: CTA examination of the chest was performed using angiographic technique according to a protocol specifically tailored to evaluate for pulmonary embolism  Axial, sagittal, and coronal 2D reformatted images were created from the source data and  submitted for interpretation  In addition, coronal 3D MIP postprocessing was performed on the acquisition scanner  Radiation dose length product (DLP) for this visit:  514 02 mGy-cm   This examination, like all CT scans performed in the [facility], was performed utilizing techniques to minimize radiation dose exposure, including the use of iterative  reconstruction and automated exposure control  IV Contrast:  85 mL of iohexol (OMNIPAQUE)  FINDINGS: PULMONARY ARTERIAL TREE:  No filling defect in the visualized pulmonary arteries to suggest acute embolus  LUNGS:  Groundglass opacities, most prominent in the lower lungs with a peripheral distribution, demonstrating a consolidative appearance  No pulmonary interstitial edema or pneumothorax  There is no tracheal or endobronchial lesion  PLEURA:  No effusion  HEART/GREAT VESSELS:  Normal heart size  Mild aneurysmal dilatation of ascending aorta measuring 3 9 cm in AP diameter  No  MEDIASTINUM AND DAVID:  No lymphadenopathy   CHEST WALL AND Cr    Creatinine Urine mg/dL 7.1 mg/dL   Albumin Random Urine Quantitative with Creat Ratio    Collection Time: 02/12/24  5:30 PM   Result Value Ref Range    Creatinine Urine mg/dL 6.9 mg/dL    Albumin Urine mg/L 34.1 mg/L    Albumin Urine mg/g Cr 494.20 (H) 0.00 - 17.00 mg/g Cr   Glucose by meter    Collection Time: 02/12/24  6:04 PM   Result Value Ref Range    GLUCOSE BY METER POCT 147 (H) 70 - 99 mg/dL   Glucose by meter    Collection Time: 02/12/24  9:56 PM   Result Value Ref Range    GLUCOSE BY METER POCT 144 (H) 70 - 99 mg/dL   Glucose by meter    Collection Time: 02/13/24  2:05 AM   Result Value Ref Range    GLUCOSE BY METER POCT 125 (H) 70 - 99 mg/dL   Basic metabolic panel    Collection Time: 02/13/24  6:19 AM   Result Value Ref Range    Sodium 133 (L) 135 - 145 mmol/L    Potassium 4.4 3.4 - 5.3 mmol/L    Chloride 98 98 - 107 mmol/L    Carbon Dioxide (CO2) 27 22 - 29 mmol/L    Anion Gap 8 7 - 15 mmol/L    Urea Nitrogen 21.2 8.0 - 23.0 mg/dL    Creatinine 3.66 (H) 0.67 - 1.17 mg/dL    GFR Estimate 16 (L) >60 mL/min/1.73m2    Calcium 8.6 (L) 8.8 - 10.2 mg/dL    Glucose 122 (H) 70 - 99 mg/dL   Lactic acid whole blood    Collection Time: 02/13/24  6:19 AM   Result Value Ref Range    Lactic Acid 1.0 0.7 - 2.0 mmol/L   CBC with platelets    Collection Time: 02/13/24  6:19 AM   Result Value Ref Range    WBC Count 8.5 4.0 - 11.0 10e3/uL    RBC Count 2.50 (L) 4.40 - 5.90 10e6/uL    Hemoglobin 8.1 (L) 13.3 - 17.7 g/dL    Hematocrit 24.3 (L) 40.0 - 53.0 %    MCV 97 78 - 100 fL    MCH 32.4 26.5 - 33.0 pg    MCHC 33.3 31.5 - 36.5 g/dL    RDW 13.2 10.0 - 15.0 %    Platelet Count 350 150 - 450 10e3/uL   INR    Collection Time: 02/13/24  7:53 AM   Result Value Ref Range    INR 1.42 (H) 0.85 - 1.15   Adult Type and Screen    Collection Time: 02/13/24  7:53 AM   Result Value Ref Range    ABO/RH(D) AB POS     Antibody Screen Negative Negative    SPECIMEN EXPIRATION DATE [id number]    Glucose by meter    Collection Time:  02/13/24  8:12 AM   Result Value Ref Range    GLUCOSE BY METER POCT 113 (H) 70 - 99 mg/dL   Renal Biopsy (Reference Laboratory)    Collection Time: 02/13/24 10:40 AM   Result Value Ref Range    See Scanned Result See Scanned Result    Glucose by meter    Collection Time: 02/13/24 11:44 AM   Result Value Ref Range    GLUCOSE BY METER POCT 111 (H) 70 - 99 mg/dL   Protein electrophoresis random urine    Collection Time: 02/13/24  1:52 PM   Result Value Ref Range    Albumin Urine 24.4 (H) <=0.0 %    Alpha 1 Urine 16.6 (H) <=0.0 %    Alpha 2 Urine 14.2 (H) <=0.0 %    Beta Globulin Urine 25.4 (H) <=0.0 %    Gamma Globulin Urine 19.4 (H) <=0.0 %    Monocloncal Peak Urine % 0.0 <=0.0 %    ELP Interpretation Urine       Both albumin and globulins seen. Consistent with a mixed proteinuria. Cannot rule out the presence of monoclonal free light chains. Recommend urine immunofixation if clinically indicated.We recommend a first morning voided urine to detect clinically significant proteinuria. A random specimen is not optimal for detecting all proteins. The specific gravity of this specimen was only 1.010. Pathologic significance requires clinical correlation. Aiyana Pino MD   Glucose by meter    Collection Time: 02/13/24  4:38 PM   Result Value Ref Range    GLUCOSE BY METER POCT 118 (H) 70 - 99 mg/dL   Glucose by meter    Collection Time: 02/13/24 10:39 PM   Result Value Ref Range    GLUCOSE BY METER POCT 135 (H) 70 - 99 mg/dL   Albumin level    Collection Time: 02/14/24 12:03 AM   Result Value Ref Range    Albumin 2.9 (L) 3.5 - 5.2 g/dL   Glucose by meter    Collection Time: 02/14/24  2:40 AM   Result Value Ref Range    GLUCOSE BY METER POCT 107 (H) 70 - 99 mg/dL   Basic metabolic panel    Collection Time: 02/14/24  7:21 AM   Result Value Ref Range    Sodium 134 (L) 135 - 145 mmol/L    Potassium 4.0 3.4 - 5.3 mmol/L    Chloride 97 (L) 98 - 107 mmol/L    Carbon Dioxide (CO2) 24 22 - 29 mmol/L    Anion Gap 13 7 - 15 mmol/L  LOWER NECK:   Unremarkable  VISUALIZED STRUCTURES IN THE UPPER ABDOMEN:  Wall thickening in the distal thoracic esophagus and gastroesophageal junction could represent esophagitis  OSSEOUS STRUCTURES:  No acute fracture or destructive osseous lesion  Impression: 1  Findings compatible with Covid-19 pneumonia  2  No evidence of acute pulmonary embolus  Mild aneurysmal dilatation of the ascending aorta measuring 3 9 cm  Workstation performed: [id number]       EKG, Pathology, and Other Studies Reviewed on Admission:   · EKG:  Normal sinus rhythm    Allscripts Records Reviewed: Yes     ** Please Note: Dragon 360 Dictation voice to text software may have been used in the creation of this document   **    Urea Nitrogen 26.0 (H) 8.0 - 23.0 mg/dL    Creatinine 4.54 (H) 0.67 - 1.17 mg/dL    GFR Estimate 13 (L) >60 mL/min/1.73m2    Calcium 8.6 (L) 8.8 - 10.2 mg/dL    Glucose 115 (H) 70 - 99 mg/dL   CBC with platelets    Collection Time: 02/14/24  7:21 AM   Result Value Ref Range    WBC Count 9.7 4.0 - 11.0 10e3/uL    RBC Count 2.64 (L) 4.40 - 5.90 10e6/uL    Hemoglobin 8.5 (L) 13.3 - 17.7 g/dL    Hematocrit 25.2 (L) 40.0 - 53.0 %    MCV 96 78 - 100 fL    MCH 32.2 26.5 - 33.0 pg    MCHC 33.7 31.5 - 36.5 g/dL    RDW 13.3 10.0 - 15.0 %    Platelet Count 359 150 - 450 10e3/uL   Glucose by meter    Collection Time: 02/14/24  9:31 AM   Result Value Ref Range    GLUCOSE BY METER POCT 131 (H) 70 - 99 mg/dL   Glucose by meter    Collection Time: 02/14/24  9:54 AM   Result Value Ref Range    GLUCOSE BY METER POCT 136 (H) 70 - 99 mg/dL   Glucose by meter    Collection Time: 02/14/24  4:09 PM   Result Value Ref Range    GLUCOSE BY METER POCT 122 (H) 70 - 99 mg/dL   Basic metabolic panel    Collection Time: 02/16/24  7:17 AM   Result Value Ref Range    Sodium 136 135 - 145 mmol/L    Potassium 3.4 3.4 - 5.3 mmol/L    Chloride 100 98 - 107 mmol/L    Carbon Dioxide (CO2) 26 22 - 29 mmol/L    Anion Gap 10 7 - 15 mmol/L    Urea Nitrogen 12.1 8.0 - 23.0 mg/dL    Creatinine 3.22 (H) 0.67 - 1.17 mg/dL    GFR Estimate 19 (L) >60 mL/min/1.73m2    Calcium 8.5 (L) 8.8 - 10.2 mg/dL    Glucose 108 (H) 70 - 99 mg/dL   CBC with platelets    Collection Time: 02/16/24  7:17 AM   Result Value Ref Range    WBC Count 8.7 4.0 - 11.0 10e3/uL    RBC Count 2.18 (L) 4.40 - 5.90 10e6/uL    Hemoglobin 7.1 (L) 13.3 - 17.7 g/dL    Hematocrit 21.5 (L) 40.0 - 53.0 %    MCV 99 78 - 100 fL    MCH 32.6 26.5 - 33.0 pg    MCHC 33.0 31.5 - 36.5 g/dL    RDW 13.3 10.0 - 15.0 %    Platelet Count 332 150 - 450 10e3/uL   Hemoglobin    Collection Time: 02/18/24  6:28 AM   Result Value Ref Range    Hemoglobin 6.8 (LL) 13.3 - 17.7 g/dL   ECG 12-LEAD WITH MUSE ([facility])     Collection Time: 02/18/24  9:30 AM   Result Value Ref Range    Systolic Blood Pressure  mmHg    Diastolic Blood Pressure  mmHg    Ventricular Rate 82 BPM    Atrial Rate 277 BPM    NH Interval  ms    QRS Duration 94 ms     ms    QTc 425 ms    P Axis  degrees    R AXIS 0 degrees    T Axis 27 degrees    Interpretation ECG       Atrial fibrillation  Incomplete right bundle branch block  Abnormal ECG  When compared with ECG of 09-FEB-2024 09:10,  Atrial fibrillation has replaced Sinus rhythm  Incomplete right bundle branch block is now Present  Criteria for Septal infarct are no longer Present  Confirmed by SEE ED PROVIDER NOTE FOR, ECG INTERPRETATION ([id number]),  MCKENNA EDOUARD ([id number]) on 2/19/2024 9:17:01 AM     INR    Collection Time: 02/18/24  9:52 AM   Result Value Ref Range    INR 1.25 (H) 0.85 - 1.15   Partial thromboplastin time    Collection Time: 02/18/24  9:52 AM   Result Value Ref Range    aPTT 32 22 - 38 Seconds   Comprehensive metabolic panel    Collection Time: 02/18/24  9:52 AM   Result Value Ref Range    Sodium 140 135 - 145 mmol/L    Potassium 3.8 3.4 - 5.3 mmol/L    Carbon Dioxide (CO2) 27 22 - 29 mmol/L    Anion Gap 12 7 - 15 mmol/L    Urea Nitrogen 18.4 8.0 - 23.0 mg/dL    Creatinine 3.74 (H) 0.67 - 1.17 mg/dL    GFR Estimate 16 (L) >60 mL/min/1.73m2    Calcium 8.5 (L) 8.8 - 10.2 mg/dL    Chloride 101 98 - 107 mmol/L    Glucose 223 (H) 70 - 99 mg/dL    Alkaline Phosphatase 101 40 - 150 U/L    AST 83 (H) 0 - 45 U/L    ALT 27 0 - 70 U/L    Protein Total 5.8 (L) 6.4 - 8.3 g/dL    Albumin 3.4 (L) 3.5 - 5.2 g/dL    Bilirubin Total 0.3 <=1.2 mg/dL   CBC with platelets and differential    Collection Time: 02/18/24  9:52 AM   Result Value Ref Range    WBC Count 9.9 4.0 - 11.0 10e3/uL    RBC Count 2.39 (L) 4.40 - 5.90 10e6/uL    Hemoglobin 7.7 (L) 13.3 - 17.7 g/dL    Hematocrit 23.8 (L) 40.0 - 53.0 %     78 - 100 fL    MCH 32.2 26.5 - 33.0 pg    MCHC 32.4 31.5 - 36.5 g/dL    RDW 13.7 10.0 - 15.0  %    Platelet Count 335 150 - 450 10e3/uL    % Neutrophils 81 %    % Lymphocytes 9 %    % Monocytes 8 %    % Eosinophils 1 %    % Basophils 0 %    % Immature Granulocytes 1 %    NRBCs per 100 WBC 0 <1 /100    Absolute Neutrophils 8.1 1.6 - 8.3 10e3/uL    Absolute Lymphocytes 0.9 0.8 - 5.3 10e3/uL    Absolute Monocytes 0.8 0.0 - 1.3 10e3/uL    Absolute Eosinophils 0.1 0.0 - 0.7 10e3/uL    Absolute Basophils 0.0 0.0 - 0.2 10e3/uL    Absolute Immature Granulocytes 0.1 <=0.4 10e3/uL    Absolute NRBCs 0.0 10e3/uL   Adult Type and Screen    Collection Time: 02/18/24  9:52 AM   Result Value Ref Range    ABO/RH(D) AB POS     Antibody Screen Negative Negative    SPECIMEN EXPIRATION DATE [id number]    Occult blood stool    Collection Time: 02/18/24 10:13 AM   Result Value Ref Range    Occult Blood Negative Negative        MEDICATIONS:     Review of your medicines            Accurate as of February 20, 2024 11:59 PM. If you have any questions, ask your nurse or doctor.                CONTINUE these medicines which have NOT CHANGED        Dose / Directions   acetaminophen 325 MG tablet  Commonly known as: TYLENOL      Dose: 650 mg  [ACETAMINOPHEN (TYLENOL) 325 MG TABLET] Take 650 mg by mouth every 4 (four) hours as needed for pain.  Refills: 0     amLODIPine 2.5 MG tablet  Commonly known as: NORVASC  Used for: Accelerated hypertension      Dose: 5 mg  Take 2 tablets (5 mg) by mouth daily  Quantity: 60 tablet  Refills: 0     aspirin 81 MG EC tablet  Commonly known as: ASA      Dose: 81 mg  [ASPIRIN 81 MG EC TABLET] Take 81 mg by mouth daily.  Refills: 0     ferrous sulfate 325 (65 Fe) MG tablet  Commonly known as: FEROSUL  Used for: ROXIE (acute kidney injury) (H24)      Dose: 325 mg  Take 1 tablet (325 mg) by mouth every other day  Quantity: 30 tablet  Refills: 0     LANsoprazole 30 MG DR capsule  Commonly known as: PREVACID      Dose: 30 mg  Take 30 mg by mouth at bedtime  Refills: 0     polyethylene glycol 17 GM/Dose  powder  Commonly known as: MIRALAX  Used for: Other constipation      Dose: 17 g  Take 17 g by mouth daily as needed for constipation  Quantity: 510 g  Refills: 0     PreserVision AREDS 2 Caps      Dose: 1 capsule  [VIT C,Z-VN-GGGPI-LUTEIN-ZEAXAN (PRESERVISION AREDS-2) 250-90-40-1 MG CAP] Take 1 capsule by mouth 2 (two) times a day.  Refills: 0     simvastatin 20 MG tablet  Commonly known as: ZOCOR  Used for: Hyperlipidemia, unspecified hyperlipidemia type      Dose: 20 mg  Take 1 tablet (20 mg) by mouth at bedtime  Quantity: 90 tablet  Refills: 0            STOP taking      levofloxacin 250 MG tablet  Commonly known as: LEVAQUIN  Stopped by: Geneva Jaramillo CNP                 ASSESSMENT/PLAN  Encounter Diagnoses   Name Primary?    Dialysis patient (H24) Yes    Hemoglobin low     Physical deconditioning      Elevated creatinine and potassium with a low sodium, recently sent to the ER current labs sodium 136, potassium 3.4 and creatinine down to 3.22    Physical deconditioning PT OT    Falls will continue with therapy for strengthening    Pain management as needed Tylenol, gabapentin 900 mg twice daily,     Hypertension on Norvasc 2.5 mg daily, recently discontinued hydrochlorothiazide and valsartan due to elevated creatinine and potassium    diabetes type 2 he was on metformin however it was discontinued related to elevated creatinine fingersticks twice daily     GERD on Prevacid HDL on simvastatin    Anemia- HGB low at 6.8, sent to ER and hospital HGB 7.7. He was sent back to the TCU    Electronically signed by: Geneva Jaramillo CNP

## 2024-03-11 ENCOUNTER — HOSPITAL ENCOUNTER (OUTPATIENT)
Dept: CT IMAGING | Facility: HOSPITAL | Age: 69
Discharge: HOME | End: 2024-03-11
Payer: COMMERCIAL

## 2024-03-11 DIAGNOSIS — E78.5 HYPERLIPIDEMIA, UNSPECIFIED: ICD-10-CM

## 2024-03-11 PROCEDURE — 75571 CT HRT W/O DYE W/CA TEST: CPT

## 2024-09-06 ENCOUNTER — APPOINTMENT (OUTPATIENT)
Dept: RADIOLOGY | Facility: CLINIC | Age: 69
End: 2024-09-06
Payer: COMMERCIAL

## 2024-09-06 ENCOUNTER — OFFICE VISIT (OUTPATIENT)
Age: 69
End: 2024-09-06

## 2024-09-06 VITALS
BODY MASS INDEX: 25.47 KG/M2 | WEIGHT: 204.8 LBS | DIASTOLIC BLOOD PRESSURE: 88 MMHG | HEIGHT: 75 IN | SYSTOLIC BLOOD PRESSURE: 169 MMHG | HEART RATE: 79 BPM

## 2024-09-06 DIAGNOSIS — S92.015A CLOSED NONDISPLACED FRACTURE OF BODY OF LEFT CALCANEUS, INITIAL ENCOUNTER: Primary | ICD-10-CM

## 2024-09-06 DIAGNOSIS — M79.672 PAIN IN LEFT FOOT: ICD-10-CM

## 2024-09-06 DIAGNOSIS — M25.579 ANKLE PAIN, UNSPECIFIED CHRONICITY, UNSPECIFIED LATERALITY: ICD-10-CM

## 2024-09-06 PROCEDURE — 73650 X-RAY EXAM OF HEEL: CPT

## 2024-09-06 PROCEDURE — 99203 OFFICE O/P NEW LOW 30 MIN: CPT | Performed by: ORTHOPAEDIC SURGERY

## 2024-09-06 PROCEDURE — 73610 X-RAY EXAM OF ANKLE: CPT

## 2024-09-06 PROCEDURE — 28400 CLTX CALCANEAL FX W/O MNPJ: CPT | Performed by: ORTHOPAEDIC SURGERY

## 2024-09-06 NOTE — LETTER
September 6, 2024     Patient: Josh Carroll  YOB: 1955  Date of Visit: 9/6/2024      To Whom it May Concern:    Josh Carroll is under my professional care. Josh was seen in my office on 9/6/2024. Josh should not return to work until cleared by a physician. He will be re-evaluated in 4 weeks.    If you have any questions or concerns, please don't hesitate to call.         Sincerely,          Yoel Rodríguez MD        CC: No Recipients   no

## 2024-09-06 NOTE — PROGRESS NOTES
Assessment/Plan:  1. Closed nondisplaced fracture of body of left calcaneus, initial encounter  Cam Boot    Fracture / Dislocation Treatment      2. Pain in left foot  XR ankle 3+ vw left    XR heel / calcaneus 2+ vw left        Scribe Attestation    I,:  Lynn Schaefer am acting as a scribe while in the presence of the attending physician.:       I,:  Yoel Rodríguez MD personally performed the services described in this documentation    as scribed in my presence.:             Josh is a pleasant 69 y.o. adult who presents for initial evaluation of the left foot. He did sustain a posterior calcaneus fracture at work on 9/3/2024.  His fracture is minimally displaced.  This can be treated well nonsurgically.. He was placed in a CAM boot today and instructed to ambulate on the crutches with toe touch weight bearing. While he is of limited weight bearing status, he should take Aspirin 325 mg daily to prevent DVT. He was amenable to this. He will remain out of work until he is re-evaluated in 4 weeks. A note was provided to him today. He will follow-up in 4 weeks for re-evaluation of the left foot with repeat X-rays upon arrival.    Subjective:   Josh Carroll is a 69 y.o. adult who presents for initial evaluation of the left foot. This is a worker's compensation case. He is a  and fell on 9/3/2024 landing directly on his heels. He subsequently experienced intense pain in the left foot. He was seen by an ED where X-rays were obtained. He was placed in a posterior splint and crutches with toe touch weight bearing instructions. He has been taking Tylenol or Ibuprofen for pain control.      Review of Systems   Constitutional:  Positive for activity change. Negative for chills and fever.   HENT:  Negative for ear pain and sore throat.    Eyes:  Negative for pain and visual disturbance.   Respiratory:  Negative for cough and shortness of breath.    Cardiovascular:  Negative for chest pain and  palpitations.   Gastrointestinal:  Negative for abdominal pain and vomiting.   Genitourinary:  Negative for dysuria and hematuria.   Musculoskeletal:  Positive for arthralgias. Negative for back pain.   Skin:  Negative for color change and rash.   Neurological:  Negative for seizures and syncope.   All other systems reviewed and are negative.        Past Medical History:   Diagnosis Date   • Hypertension        History reviewed. No pertinent surgical history.    History reviewed. No pertinent family history.    Social History     Occupational History   • Not on file   Tobacco Use   • Smoking status: Never   • Smokeless tobacco: Never   Vaping Use   • Vaping status: Never Used   Substance and Sexual Activity   • Alcohol use: Never   • Drug use: Not on file   • Sexual activity: Not on file         Current Outpatient Medications:   •  Ascorbic Acid (vitamin C) 100 MG tablet, Take 100 mg by mouth daily, Disp: , Rfl:   •  ergocalciferol (VITAMIN D2) 50,000 units, Take 50,000 Units by mouth once a week, Disp: , Rfl:   •  Multiple Vitamin (multivitamin) tablet, Take 1 tablet by mouth daily, Disp: , Rfl:   •  apixaban (Eliquis) 5 mg, Eliquis starter pack (Patient not taking: Reported on 6/24/2022), Disp: 74 tablet, Rfl: 0  •  sodium chloride 1 g tablet, Take 1 tablet (1 g total) by mouth 2 (two) times a day for 15 days (Patient taking differently: Take 1 g by mouth in the morning), Disp: 30 tablet, Rfl: 0    Allergies   Allergen Reactions   • Sulfa Antibiotics Other (See Comments)     unknown       Objective:  Vitals:    09/06/24 1359   BP: 169/88   Pulse: 79       Left Ankle Exam     Tenderness   Left ankle tenderness location: calcaneus.     Range of Motion   The patient has normal left ankle ROM.     Other   Erythema: absent  Scars: absent  Sensation: normal  Pulse: present    Comments:  Ecchymosis on medial border of foot          Observations   Left Ankle/Foot   Negative for adhesive scar.       Physical Exam  Vitals  and nursing note reviewed.   Constitutional:       Appearance: Normal appearance.   HENT:      Head: Normocephalic and atraumatic.      Right Ear: External ear normal.      Left Ear: External ear normal.      Nose: Nose normal.   Eyes:      General: No scleral icterus.     Extraocular Movements: Extraocular movements intact.      Conjunctiva/sclera: Conjunctivae normal.   Cardiovascular:      Rate and Rhythm: Normal rate.   Pulmonary:      Effort: Pulmonary effort is normal. No respiratory distress.   Musculoskeletal:         General: Swelling, tenderness and signs of injury present.      Cervical back: Normal range of motion and neck supple.      Comments: See ortho exam   Skin:     General: Skin is warm and dry.   Neurological:      Mental Status: He is alert and oriented to person, place, and time.   Psychiatric:         Mood and Affect: Mood normal.         Behavior: Behavior normal.       Fracture / Dislocation Treatment    Date/Time: 9/6/2024 2:00 PM    Performed by: Yoel Rodríguez MD  Authorized by: Yoel Rodríguez MD  Universal Protocol:  Patient understanding: patient states understanding of the procedure being performed  Radiology Images displayed and confirmed. If images not available, report reviewed: imaging studies available    Injury location:  Foot  Location details:  Left foot  Injury type:  Fracture  Fracture type: calcaneal    Neurovascular status: Neurovascularly intact    Distal perfusion: normal    Neurological function: normal    Manipulation performed?: No    Immobilization:  Brace  Neurovascular status: Neurovascularly intact    Distal perfusion: normal    Neurological function: normal    Patient tolerance:  Patient tolerated the procedure well with no immediate complications        I have personally reviewed pertinent films in PACS and my interpretation is as follows:  X-rays of the left ankle and heel obtained in the office demonstrate a nondisplaced fracture of the posterior  calcaneus.      This document was created using speech voice recognition software.   Grammatical errors, random word insertions, pronoun errors, and incomplete sentences are an occasional consequence of this system due to software limitations, ambient noise, and hardware issues.   Any formal questions or concerns about content, text, or information contained within the body of this dictation should be directly addressed to the provider for clarification.

## 2024-09-09 ENCOUNTER — TELEPHONE (OUTPATIENT)
Age: 69
End: 2024-09-09

## 2024-09-09 NOTE — TELEPHONE ENCOUNTER
Caller: Reynaldo from [facility]    Doctor: Dr. 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  Reynaldo asking for call back to verify if this would be appropriate.    Call back#: [phone]

## 2024-09-24 ENCOUNTER — TELEPHONE (OUTPATIENT)
Age: 69
End: 2024-09-24

## 2024-09-24 NOTE — TELEPHONE ENCOUNTER
Caller: Florinda Ellis     Doctor: Yoel Phan / [place]    Reason for call: Would like to know if he could get a handicap plaque     Call back#: [phone]

## 2024-10-04 ENCOUNTER — APPOINTMENT (OUTPATIENT)
Dept: RADIOLOGY | Facility: CLINIC | Age: 69
End: 2024-10-04
Payer: COMMERCIAL

## 2024-10-04 ENCOUNTER — OFFICE VISIT (OUTPATIENT)
Age: 69
End: 2024-10-04

## 2024-10-04 VITALS
WEIGHT: 204.8 LBS | DIASTOLIC BLOOD PRESSURE: 91 MMHG | BODY MASS INDEX: 25.47 KG/M2 | HEIGHT: 75 IN | SYSTOLIC BLOOD PRESSURE: 160 MMHG | HEART RATE: 102 BPM

## 2024-10-04 DIAGNOSIS — S92.015A CLOSED NONDISPLACED FRACTURE OF BODY OF LEFT CALCANEUS, INITIAL ENCOUNTER: Primary | ICD-10-CM

## 2024-10-04 DIAGNOSIS — S92.015A CLOSED NONDISPLACED FRACTURE OF BODY OF LEFT CALCANEUS, INITIAL ENCOUNTER: ICD-10-CM

## 2024-10-04 PROCEDURE — 73650 X-RAY EXAM OF HEEL: CPT

## 2024-10-04 PROCEDURE — 73610 X-RAY EXAM OF ANKLE: CPT

## 2024-10-04 PROCEDURE — 99024 POSTOP FOLLOW-UP VISIT: CPT | Performed by: ORTHOPAEDIC SURGERY

## 2024-10-04 NOTE — PROGRESS NOTES
Improving.  X-rays show healing.  Plan for boot and wean off crutches over the next 2 weeks.  In 2 weeks he may start wearing regular shoe with a heel cup.  Follow-up in 4 weeks.  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    Assessment/Plan:  1. Closed nondisplaced fracture of body of left calcaneus, initial encounter  XR ankle 3+ vw left    XR heel / calcaneus 2+ vw left    Heel Cup        Scribe Attestation      I,:   am acting as a scribe while in the presence of the attending physician.:       I,:   personally performed the services described in this documentation    as scribed in my presence.:               Josh is a pleasant 69 y.o. adult who presents for follow-up of a nondisplaced posterior calcaneal fracture.  X-rays show maintained alignment and interval healing today.  Clinically he is doing well as well.  At this point he may start to weight-bear as tolerated in the cam boot.  For the next 2 weeks.  After that he may transition to a regular shoe with a heel cup.  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  Plan to see him back in 4 weeks for repeat radiographs.  All questions were answered today.  Subjective:   Today's visit: Pain is much improved.  Been ambulating with a boot and crutches.  He has put some weight on it without increased pain has been working light duty.    HPI:  Josh Carroll is a 69 y.o. adult who presents for initial evaluation of the left foot. This is a worker's compensation case. He is a  and fell on 9/3/2024 landing directly on his heels. He subsequently experienced intense pain in the left foot. He was seen by an ED where X-rays were obtained. He was placed in a posterior splint and crutches with toe touch weight bearing instructions. He has been taking Tylenol or Ibuprofen for pain control.      Review of Systems   Constitutional:  Positive for activity change. Negative for chills and fever.   HENT:  Negative  for ear pain and sore throat.    Eyes:  Negative for pain and visual disturbance.   Respiratory:  Negative for cough and shortness of breath.    Cardiovascular:  Negative for chest pain and palpitations.   Gastrointestinal:  Negative for abdominal pain and vomiting.   Genitourinary:  Negative for dysuria and hematuria.   Musculoskeletal:  Positive for arthralgias. Negative for back pain.   Skin:  Negative for color change and rash.   Neurological:  Negative for seizures and syncope.   All other systems reviewed and are negative.        Past Medical History:   Diagnosis Date    Hypertension        No past surgical history on file.    No family history on file.    Social History     Occupational History    Not on file   Tobacco Use    Smoking status: Never    Smokeless tobacco: Never   Vaping Use    Vaping status: Never Used   Substance and Sexual Activity    Alcohol use: Never    Drug use: Not on file    Sexual activity: Not on file         Current Outpatient Medications:     apixaban (Eliquis) 5 mg, Eliquis starter pack (Patient not taking: Reported on 6/24/2022), Disp: 74 tablet, Rfl: 0    Ascorbic Acid (vitamin C) 100 MG tablet, Take 100 mg by mouth daily, Disp: , Rfl:     ergocalciferol (VITAMIN D2) 50,000 units, Take 50,000 Units by mouth once a week, Disp: , Rfl:     Multiple Vitamin (multivitamin) tablet, Take 1 tablet by mouth daily, Disp: , Rfl:     sodium chloride 1 g tablet, Take 1 tablet (1 g total) by mouth 2 (two) times a day for 15 days (Patient taking differently: Take 1 g by mouth in the morning), Disp: 30 tablet, Rfl: 0    Allergies   Allergen Reactions    Sulfa Antibiotics Other (See Comments)     unknown       Objective:  Vitals:    10/04/24 0848   BP: 160/91   Pulse: 102       Left Ankle Exam     Tenderness   Left ankle tenderness location: calcaneus, very mild.   Swelling: mild    Range of Motion   The patient has normal left ankle ROM.   Dorsiflexion:  normal   Plantar flexion:  normal    Eversion:  normal   Inversion:  normal     Muscle Strength   Dorsiflexion:  5/5   Plantar flexion:  5/5   Anterior tibial:  5/5   Posterior tibial:  5/5  Gastrocsoleus:  5/5  Peroneal muscle:  5/5    Other   Erythema: absent  Scars: absent  Sensation: normal  Pulse: present    Comments:  Ecchymosis improved          Observations   Left Ankle/Foot   Negative for adhesive scar.     Strength/Myotome Testing     Left Ankle/Foot   Dorsiflexion: 5  Plantar flexion: 5      Physical Exam  Vitals and nursing note reviewed.   Constitutional:       Appearance: Normal appearance.   HENT:      Head: Normocephalic and atraumatic.      Right Ear: External ear normal.      Left Ear: External ear normal.      Nose: Nose normal.   Eyes:      General: No scleral icterus.     Extraocular Movements: Extraocular movements intact.      Conjunctiva/sclera: Conjunctivae normal.   Cardiovascular:      Rate and Rhythm: Normal rate.   Pulmonary:      Effort: Pulmonary effort is normal. No respiratory distress.   Musculoskeletal:         General: Swelling, tenderness and signs of injury present.      Cervical back: Normal range of motion and neck supple.      Comments: See ortho exam   Skin:     General: Skin is warm and dry.   Neurological:      Mental Status: He is alert and oriented to person, place, and time.   Psychiatric:         Mood and Affect: Mood normal.         Behavior: Behavior normal.           I have personally reviewed pertinent films in PACS and my interpretation is as follows:  X-rays of the left ankle and heel obtained in the office demonstrate a nondisplaced fracture of the posterior calcaneus, maintained alignment from previous with interval signs of healing      This document was created using speech voice recognition software.   Grammatical errors, random word insertions, pronoun errors, and incomplete sentences are an occasional consequence of this system due to software limitations, ambient noise, and hardware  issues.   Any formal questions or concerns about content, text, or information contained within the body of this dictation should be directly addressed to the provider for clarification.

## 2024-10-04 NOTE — LETTER
October 4, 2024     Patient: Josh Carroll  YOB: 1955  Date of Visit: 10/4/2024      To Whom it May Concern:    Josh Carroll is under my professional care. Josh was seen in my office on 10/4/2024. 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     If you have any questions or concerns, please don't hesitate to call.         Sincerely,          Yoel Rodríguez MD        CC: No Recipients

## 2024-10-04 NOTE — LETTER
October 4, 2024     Patient: Josh Carroll  YOB: 1955  Date of Visit: 10/4/2024      To Whom it May Concern:    Josh Carroll is under my professional care. Josh was seen in my office on 10/4/2024. 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  The reason for weeks and progressed from there.    If you have any questions or concerns, please don't hesitate to call.         Sincerely,          Yoel Rodríguez MD        CC: No Recipients

## 2024-10-11 ENCOUNTER — TELEPHONE (OUTPATIENT)
Age: 69
End: 2024-10-11

## 2024-10-14 ENCOUNTER — TELEPHONE (OUTPATIENT)
Age: 69
End: 2024-10-14

## 2024-11-01 ENCOUNTER — OFFICE VISIT (OUTPATIENT)
Age: 69
End: 2024-11-01
Payer: OTHER MISCELLANEOUS

## 2024-11-01 ENCOUNTER — APPOINTMENT (OUTPATIENT)
Dept: RADIOLOGY | Facility: CLINIC | Age: 69
End: 2024-11-01
Payer: COMMERCIAL

## 2024-11-01 VITALS
HEART RATE: 84 BPM | SYSTOLIC BLOOD PRESSURE: 168 MMHG | DIASTOLIC BLOOD PRESSURE: 91 MMHG | HEIGHT: 75 IN | WEIGHT: 204 LBS | BODY MASS INDEX: 25.36 KG/M2

## 2024-11-01 DIAGNOSIS — S92.015D CLOSED NONDISPLACED FRACTURE OF BODY OF LEFT CALCANEUS WITH ROUTINE HEALING, SUBSEQUENT ENCOUNTER: Primary | ICD-10-CM

## 2024-11-01 DIAGNOSIS — S92.015A CLOSED NONDISPLACED FRACTURE OF BODY OF LEFT CALCANEUS, INITIAL ENCOUNTER: ICD-10-CM

## 2024-11-01 PROCEDURE — 99213 OFFICE O/P EST LOW 20 MIN: CPT | Performed by: ORTHOPAEDIC SURGERY

## 2024-11-01 PROCEDURE — 73650 X-RAY EXAM OF HEEL: CPT

## 2024-11-01 PROCEDURE — 73610 X-RAY EXAM OF ANKLE: CPT

## 2024-11-01 NOTE — PROGRESS NOTES
Assessment/Plan:  1. Closed nondisplaced fracture of body of left calcaneus with routine healing, subsequent encounter  XR heel / calcaneus 2+ vw left    XR ankle 3+ vw left        Scribe Attestation      I,:  Lynn Schaefer am acting as a scribe while in the presence of the attending physician.:       I,:  Yoel Rodríguez MD personally performed the services described in this documentation    as scribed in my presence.:               Josh is a pleasant 69 y.o. adult who presents for follow-up evaluation of his left foot. This is a worker's compensation case. He is nearly 8 weeks status post closed treatment of a nondisplaced calcaneal body fracture of the left foot. He presents well clinically today. We discussed that residual swelling of the foot and ankle is common after an injury to the extremity and can last up to 6 months after injury. His fracture is well healed and he has minimal pain at this point. He expressed his understanding of this. He may continue using a heel cup as needed. A note was provided to him today clearing him to return to work full duty. He may continue taking over the counter NSAIDs or pain relievers as needed.     Subjective:   Josh Carroll is a 69 y.o. adult who presents for follow-up evaluation of his left foot. This is a worker's compensation case. He is nearly 8 weeks status post closed treatment of a nondisplaced calcaneal body fracture of the left foot. He has been compliant with use of the heel cup in his left shoe. He denies any significant pain about the left foot. He does report swelling of the left ankle still.He state the swelling has improved since the injury. He notices the swelling most when trying to wear work boots. He feels his gait pattern has returned to baseline. He is able to use stairs reciprocally at this time without issue. 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  He has not tried using compression on the ankle; however, he does wear two socks when working. He takes two tylenol at night before bed. He denies any radicular symptoms or distal paresthesias of the LLE.      Review of Systems   Constitutional:  Negative for chills and fever.   HENT:  Negative for ear pain and sore throat.    Eyes:  Negative for pain and visual disturbance.   Respiratory:  Negative for cough and shortness of breath.    Cardiovascular:  Negative for chest pain and palpitations.   Gastrointestinal:  Negative for abdominal pain and vomiting.   Genitourinary:  Negative for dysuria and hematuria.   Musculoskeletal:  Positive for joint swelling. Negative for arthralgias and back pain.   Skin:  Negative for color change and rash.   Neurological:  Negative for seizures and syncope.   All other systems reviewed and are negative.        Past Medical History:   Diagnosis Date    Hypertension        History reviewed. No pertinent surgical history.    History reviewed. No pertinent family history.    Social History     Occupational History    Not on file   Tobacco Use    Smoking status: Never    Smokeless tobacco: Never   Vaping Use    Vaping status: Never Used   Substance and Sexual Activity    Alcohol use: Never    Drug use: Not on file    Sexual activity: Not on file         Current Outpatient Medications:     apixaban (Eliquis) 5 mg, Eliquis starter pack (Patient not taking: Reported on 6/24/2022), Disp: 74 tablet, Rfl: 0    Ascorbic Acid (vitamin C) 100 MG tablet, Take 100 mg by mouth daily, Disp: , Rfl:     ergocalciferol (VITAMIN D2) 50,000 units, Take 50,000 Units by mouth once a week, Disp: , Rfl:     Multiple Vitamin (multivitamin) tablet, Take 1 tablet by mouth daily, Disp: , Rfl:     sodium chloride 1 g tablet, Take 1 tablet (1 g total) by mouth 2 (two) times a day for 15 days (Patient taking differently: Take 1 g by mouth in the morning), Disp: 30 tablet, Rfl: 0    Allergies   Allergen Reactions     Sulfa Antibiotics Other (See Comments)     unknown       Objective:  Vitals:    11/01/24 0807   BP: 168/91   Pulse: 84       Left Ankle Exam     Tenderness   The patient is experiencing no tenderness.   Swelling: mild    Range of Motion   The patient has normal left ankle ROM.   Dorsiflexion:  normal   Plantar flexion:  normal   Eversion:  normal   Inversion:  normal     Muscle Strength   Dorsiflexion:  5/5   Plantar flexion:  5/5   Anterior tibial:  5/5   Posterior tibial:  5/5  Gastrocsoleus:  5/5  Peroneal muscle:  5/5    Other   Erythema: absent  Scars: absent  Sensation: normal  Pulse: present          Observations   Left Ankle/Foot   Negative for adhesive scar.     Strength/Myotome Testing     Left Ankle/Foot   Dorsiflexion: 5  Plantar flexion: 5      Physical Exam  Vitals and nursing note reviewed.   Constitutional:       Appearance: Normal appearance.   HENT:      Head: Normocephalic and atraumatic.      Right Ear: External ear normal.      Left Ear: External ear normal.      Nose: Nose normal.   Eyes:      General: No scleral icterus.     Extraocular Movements: Extraocular movements intact.      Conjunctiva/sclera: Conjunctivae normal.   Cardiovascular:      Rate and Rhythm: Normal rate.   Pulmonary:      Effort: Pulmonary effort is normal. No respiratory distress.   Musculoskeletal:      Cervical back: Normal range of motion and neck supple.      Comments: See ortho exam   Skin:     General: Skin is warm and dry.   Neurological:      Mental Status: He is alert and oriented to person, place, and time.   Psychiatric:         Mood and Affect: Mood normal.         Behavior: Behavior normal.       I have personally reviewed pertinent films in PACS and my interpretation is as follows:  3 views of the left ankle and 2 views of the left calcaneus were performed which show interval healing and no displacement of the calcaneal body fracture.      This document was created using speech voice recognition software.    Grammatical errors, random word insertions, pronoun errors, and incomplete sentences are an occasional consequence of this system due to software limitations, ambient noise, and hardware issues.   Any formal questions or concerns about content, text, or information contained within the body of this dictation should be directly addressed to the provider for clarification.

## 2024-11-01 NOTE — LETTER
November 1, 2024     Patient: Josh Carroll  YOB: 1955  Date of Visit: 11/1/2024      To Whom it May Concern:    Josh Carroll is under my professional care. Josh was seen in my office on 11/1/2024. Josh may return to work on 11/1/2024 with no limitations or restrictions .    If you have any questions or concerns, please don't hesitate to call.         Sincerely,          Yoel Rodríguez MD        CC: No Recipients

## 2025-03-06 ENCOUNTER — OFFICE VISIT (OUTPATIENT)
Dept: CARDIOLOGY CLINIC | Facility: CLINIC | Age: 70
End: 2025-03-06
Payer: COMMERCIAL

## 2025-03-06 VITALS
HEIGHT: 75 IN | TEMPERATURE: 98.4 F | DIASTOLIC BLOOD PRESSURE: 80 MMHG | OXYGEN SATURATION: 98 % | WEIGHT: 168 LBS | HEART RATE: 97 BPM | SYSTOLIC BLOOD PRESSURE: 140 MMHG | BODY MASS INDEX: 20.89 KG/M2

## 2025-03-06 DIAGNOSIS — Z98.890 S/P AORTIC DISSECTION REPAIR: ICD-10-CM

## 2025-03-06 DIAGNOSIS — R06.02 SHORTNESS OF BREATH: ICD-10-CM

## 2025-03-06 DIAGNOSIS — I10 PRIMARY HYPERTENSION: ICD-10-CM

## 2025-03-06 DIAGNOSIS — R94.31 ABNORMAL ECG: ICD-10-CM

## 2025-03-06 DIAGNOSIS — Z89.612 S/P ABOVE KNEE AMPUTATION, LEFT (HCC): ICD-10-CM

## 2025-03-06 DIAGNOSIS — Z01.810 PREOP CARDIOVASCULAR EXAM: Primary | ICD-10-CM

## 2025-03-06 PROCEDURE — 99204 OFFICE O/P NEW MOD 45 MIN: CPT | Performed by: INTERNAL MEDICINE

## 2025-03-06 RX ORDER — METOPROLOL SUCCINATE 50 MG/1
50 TABLET, EXTENDED RELEASE ORAL EVERY 12 HOURS
Qty: 180 TABLET | Refills: 3 | Status: SHIPPED | OUTPATIENT
Start: 2025-03-06

## 2025-03-06 RX ORDER — METOPROLOL TARTRATE 25 MG/1
25 TABLET, FILM COATED ORAL 3 TIMES DAILY
COMMUNITY
Start: 2025-03-01 | End: 2025-03-06 | Stop reason: ALTCHOICE

## 2025-03-06 RX ORDER — ZINC GLUCONATE 50 MG
50 TABLET ORAL DAILY
COMMUNITY

## 2025-03-06 RX ORDER — ASPIRIN 81 MG/1
81 TABLET ORAL DAILY
COMMUNITY
Start: 2025-01-12
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
## 2025-03-18 ENCOUNTER — TELEPHONE (OUTPATIENT)
Age: 70
End: 2025-03-18

## 2025-03-18 NOTE — TELEPHONE ENCOUNTER
Caller: Patient    Doctor: Dr. Omer    Call back #: [phone]    Reason for call: Patient needs our office to fax over his last office visit notes from Dr. Omer on March 6th. This is to his  for his short term disability paperwork. His case workers name is Mitzi Quevedo. Her phone number is [phone]. Her direct fax number is [phone].

## 2025-03-19 ENCOUNTER — HOSPITAL ENCOUNTER (OUTPATIENT)
Dept: NON INVASIVE DIAGNOSTICS | Facility: HOSPITAL | Age: 70
Discharge: HOME | End: 2025-03-19
Attending: INTERNAL MEDICINE
Payer: COMMERCIAL

## 2025-03-19 ENCOUNTER — HOSPITAL ENCOUNTER (OUTPATIENT)
Dept: RADIOLOGY | Facility: HOSPITAL | Age: 70
Discharge: HOME | End: 2025-03-19
Attending: INTERNAL MEDICINE
Payer: COMMERCIAL

## 2025-03-19 VITALS
HEIGHT: 75 IN | DIASTOLIC BLOOD PRESSURE: 63 MMHG | SYSTOLIC BLOOD PRESSURE: 103 MMHG | WEIGHT: 168 LBS | BODY MASS INDEX: 20.89 KG/M2 | HEART RATE: 82 BPM

## 2025-03-19 VITALS
RESPIRATION RATE: 20 BRPM | SYSTOLIC BLOOD PRESSURE: 159 MMHG | DIASTOLIC BLOOD PRESSURE: 67 MMHG | OXYGEN SATURATION: 100 % | HEART RATE: 76 BPM

## 2025-03-19 DIAGNOSIS — R06.02 SHORTNESS OF BREATH: ICD-10-CM

## 2025-03-19 DIAGNOSIS — R94.31 ABNORMAL ECG: ICD-10-CM

## 2025-03-19 LAB
CHEST PAIN STATEMENT: NORMAL
MAX DIASTOLIC BP: 67 MMHG
MAX HR PERCENT: 59 %
MAX HR: 88 BPM
MAX PREDICTED HEART RATE: 149 BPM
PROTOCOL NAME: NORMAL
RATE PRESSURE PRODUCT: 9592
REASON FOR TERMINATION: NORMAL
SL CV REST NUCLEAR ISOTOPE DOSE: 11 MCI
SL CV STRESS NUCLEAR ISOTOPE DOSE: 32.8 MCI
SL CV STRESS RECOVERY BP: NORMAL MMHG
SL CV STRESS RECOVERY HR: 83 BPM
SL CV STRESS RECOVERY O2 SAT: 100 %
STRESS ANGINA INDEX: 0
STRESS BASELINE BP: NORMAL MMHG
STRESS BASELINE HR: 76 BPM
STRESS O2 SAT REST: 100 %
STRESS PEAK HR: 88 BPM
STRESS POST ESTIMATED WORKLOAD: 1 METS
STRESS POST EXERCISE DUR MIN: 1 MIN
STRESS POST EXERCISE DUR MIN: 1 MIN
STRESS POST EXERCISE DUR SEC: 1 SEC
STRESS POST O2 SAT PEAK: 99 %
STRESS POST PEAK BP: 109 MMHG
STRESS POST PEAK HR: 88 BPM
STRESS POST PEAK SYSTOLIC BP: 159 MMHG
TARGET HR FORMULA: NORMAL
TEST INDICATION: NORMAL

## 2025-03-19 PROCEDURE — A9502 TC99M TETROFOSMIN: HCPCS

## 2025-03-19 PROCEDURE — 93306 TTE W/DOPPLER COMPLETE: CPT

## 2025-03-19 PROCEDURE — 93018 CV STRESS TEST I&R ONLY: CPT | Performed by: INTERNAL MEDICINE

## 2025-03-19 PROCEDURE — 78452 HT MUSCLE IMAGE SPECT MULT: CPT

## 2025-03-19 PROCEDURE — 93017 CV STRESS TEST TRACING ONLY: CPT

## 2025-03-19 PROCEDURE — 93016 CV STRESS TEST SUPVJ ONLY: CPT | Performed by: INTERNAL MEDICINE

## 2025-03-19 RX ORDER — REGADENOSON 0.08 MG/ML
0.4 INJECTION, SOLUTION INTRAVENOUS ONCE
Status: COMPLETED | OUTPATIENT
Start: 2025-03-19 | End: 2025-03-19

## 2025-03-19 RX ADMIN — REGADENOSON 0.4 MG: 0.08 INJECTION, SOLUTION INTRAVENOUS at 09:45

## 2025-03-20 LAB
AORTIC ROOT: 4.1 CM
AV LVOT PEAK GRADIENT: 2 MMHG
AV PEAK GRADIENT: 4 MMHG
DOP CALC LVOT AREA: 3.14 CM2
DOP CALC LVOT DIAMETER: 2 CM
E WAVE DECELERATION TIME: 69 MS
E/A RATIO: 0.67
FRACTIONAL SHORTENING: 22 (ref 28–44)
INTERVENTRICULAR SEPTUM IN DIASTOLE (PARASTERNAL SHORT AXIS VIEW): 1.3 CM
INTERVENTRICULAR SEPTUM: 1.3 CM (ref 0.6–1.1)
LAAS-AP2: 15.3 CM2
LAAS-AP4: 16.8 CM2
LEFT ATRIUM AREA SYSTOLE SINGLE PLANE A4C: 15.8 CM2
LEFT ATRIUM SIZE: 4 CM
LEFT ATRIUM VOLUME (MOD BIPLANE): 45 ML
LEFT ATRIUM VOLUME INDEX (MOD BIPLANE): 22.1 ML/M2
LEFT INTERNAL DIMENSION IN SYSTOLE: 2.9 CM (ref 2.1–4)
LEFT VENTRICULAR INTERNAL DIMENSION IN DIASTOLE: 3.7 CM (ref 3.5–6)
LEFT VENTRICULAR POSTERIOR WALL IN END DIASTOLE: 1 CM
LEFT VENTRICULAR STROKE VOLUME: 25 ML
LVSV (TEICH): 25 ML
MV E'TISSUE VEL-LAT: 7 CM/S
MV E'TISSUE VEL-SEP: 5 CM/S
MV PEAK A VEL: 0.81 M/S
MV PEAK E VEL: 54 CM/S
MV STENOSIS PRESSURE HALF TIME: 20 MS
MV VALVE AREA P 1/2 METHOD: 11
PV PEAK GRADIENT: 2 MMHG
RIGHT ATRIUM AREA SYSTOLE A4C: 14.1 CM2
RIGHT VENTRICLE ID DIMENSION: 3.1 CM
SINOTUBULAR JUNCTION: 4.5 CM
SL CV LEFT ATRIUM LENGTH A2C: 4.7 CM
SL CV LV EF: 45
SL CV PED ECHO LEFT VENTRICLE DIASTOLIC VOLUME (MOD BIPLANE) 2D: 58 ML
SL CV PED ECHO LEFT VENTRICLE SYSTOLIC VOLUME (MOD BIPLANE) 2D: 32 ML
SL CV SINUS OF VALSALVA 2D: 4.2 CM
STJ: 4.5 CM
TR MAX PG: 18 MMHG
TR PEAK VELOCITY: 2.1 M/S
TRICUSPID ANNULAR PLANE SYSTOLIC EXCURSION: 1.3 CM
TRICUSPID VALVE PEAK REGURGITATION VELOCITY: 2.09 M/S

## 2025-03-20 PROCEDURE — 93306 TTE W/DOPPLER COMPLETE: CPT | Performed by: INTERNAL MEDICINE

## 2025-03-27 ENCOUNTER — RESULTS FOLLOW-UP (OUTPATIENT)
Dept: CARDIOLOGY CLINIC | Facility: MEDICAL CENTER | Age: 70
End: 2025-03-27

## 2025-03-27 ENCOUNTER — TELEPHONE (OUTPATIENT)
Age: 70
End: 2025-03-27

## 2025-03-27 NOTE — TELEPHONE ENCOUNTER
Caller: dinh Moreno    Doctor: Dr. Omer    Reason for call: Pt would like results of Stress Test/ Echo completed on 3/19/25 to be sent to PCP, Dr. Wynn - fax [phone].    Call back#: NA

## 2025-03-28 NOTE — RESULT ENCOUNTER NOTE
Called pt and gave stress test and echo results. Pt understood. Pt wondering if the aortic valve repair he got done at [facility] could affect his heart pumping function or contribute to the decrease in function. Please advise, thank you!    Scheduled pt a follow up with you on 4/29.

## 2025-04-15 ENCOUNTER — TELEPHONE (OUTPATIENT)
Age: 70
End: 2025-04-15

## 2025-04-15 NOTE — TELEPHONE ENCOUNTER
Caller: Spouse    Doctor/Office: Dr Rodríguez    Call regarding :  records     Call was transferred to: medical records

## 2025-04-29 ENCOUNTER — OFFICE VISIT (OUTPATIENT)
Dept: CARDIOLOGY CLINIC | Facility: CLINIC | Age: 70
End: 2025-04-29
Payer: OTHER MISCELLANEOUS

## 2025-04-29 VITALS
DIASTOLIC BLOOD PRESSURE: 62 MMHG | SYSTOLIC BLOOD PRESSURE: 132 MMHG | OXYGEN SATURATION: 98 % | BODY MASS INDEX: 21.58 KG/M2 | HEIGHT: 75 IN | WEIGHT: 173.6 LBS | HEART RATE: 91 BPM | TEMPERATURE: 98.4 F

## 2025-04-29 DIAGNOSIS — Z98.890 S/P AORTIC DISSECTION REPAIR: ICD-10-CM

## 2025-04-29 DIAGNOSIS — I42.9 CARDIOMYOPATHY, UNSPECIFIED TYPE (HCC): ICD-10-CM

## 2025-04-29 DIAGNOSIS — I10 PRIMARY HYPERTENSION: Primary | ICD-10-CM

## 2025-04-29 DIAGNOSIS — Z89.612 S/P ABOVE KNEE AMPUTATION, LEFT (HCC): ICD-10-CM

## 2025-04-29 PROCEDURE — 99214 OFFICE O/P EST MOD 30 MIN: CPT | Performed by: INTERNAL MEDICINE

## 2025-04-29 NOTE — ASSESSMENT & PLAN NOTE
Reviewed logs: 130-140/70-90, HR 70-85  Well controlled  Continue metoprolol succinate 50 mg twice daily
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
## 2025-07-14 ENCOUNTER — TELEPHONE (OUTPATIENT)
Age: 70
End: 2025-07-14

## 2025-07-14 NOTE — TELEPHONE ENCOUNTER
Pt called back.  Attempted to transfer call to [place] office but was unable to connect.  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

## 2025-07-14 NOTE — TELEPHONE ENCOUNTER
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  Pt passed his physical portion but is still required to have a cardiac clearance letter.        Please complete letter and fax to Dr Cm Corcoran at [phone].  Thank you

## 2025-07-14 NOTE — TELEPHONE ENCOUNTER
Called pt and left message asking for clarification of what he needed from us. 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 Asked pt to call us back to clarify what he needed from us whether it was a form filled out or simply a cardiac clearance letter. Left office call back number.

## 2025-07-14 NOTE — TELEPHONE ENCOUNTER
Patient calling back, according to Dr. Corcoran letter should be on letterhead and contain a few sentences signed off by Dr. 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     Address letter to:   Dr. Cm Corcoran  [facility]  [phone]  Website: [email or web address]    Letter can be uploaded into patient's MyChart.

## 2025-07-17 ENCOUNTER — OFFICE VISIT (OUTPATIENT)
Dept: CARDIOLOGY CLINIC | Facility: CLINIC | Age: 70
End: 2025-07-17
Payer: COMMERCIAL

## 2025-07-17 VITALS
DIASTOLIC BLOOD PRESSURE: 80 MMHG | HEART RATE: 78 BPM | OXYGEN SATURATION: 98 % | SYSTOLIC BLOOD PRESSURE: 158 MMHG | BODY MASS INDEX: 23.38 KG/M2 | TEMPERATURE: 98.6 F | WEIGHT: 188 LBS | HEIGHT: 75 IN

## 2025-07-17 DIAGNOSIS — G47.33 OSA (OBSTRUCTIVE SLEEP APNEA): ICD-10-CM

## 2025-07-17 DIAGNOSIS — I71.010 DISSECTION OF ASCENDING AORTA (HCC): ICD-10-CM

## 2025-07-17 DIAGNOSIS — Z98.890 S/P AORTIC DISSECTION REPAIR: ICD-10-CM

## 2025-07-17 DIAGNOSIS — I42.9 CARDIOMYOPATHY, UNSPECIFIED TYPE (HCC): Primary | ICD-10-CM

## 2025-07-17 DIAGNOSIS — I10 PRIMARY HYPERTENSION: ICD-10-CM

## 2025-07-17 PROCEDURE — 93000 ELECTROCARDIOGRAM COMPLETE: CPT | Performed by: INTERNAL MEDICINE

## 2025-07-17 PROCEDURE — 99214 OFFICE O/P EST MOD 30 MIN: CPT | Performed by: INTERNAL MEDICINE

## 2025-07-17 RX ORDER — PANTOPRAZOLE SODIUM 20 MG/1
1 TABLET, DELAYED RELEASE ORAL DAILY
COMMUNITY
Start: 2025-06-24

## 2025-07-17 RX ORDER — CHLORAL HYDRATE 500 MG
1000 CAPSULE ORAL DAILY
COMMUNITY

## 2025-08-01 PROBLEM — G47.33 OSA (OBSTRUCTIVE SLEEP APNEA): Status: ACTIVE | Noted: 2025-08-01

## 2025-08-01 PROBLEM — I42.9 CARDIOMYOPATHY (HCC): Status: ACTIVE | Noted: 2025-08-01

## 2025-08-14 ENCOUNTER — OFFICE VISIT (OUTPATIENT)
Dept: URGENT CARE | Facility: CLINIC | Age: 70
End: 2025-08-14
Payer: COMMERCIAL